# Patient Record
Sex: MALE | Race: WHITE | Employment: OTHER | URBAN - METROPOLITAN AREA
[De-identification: names, ages, dates, MRNs, and addresses within clinical notes are randomized per-mention and may not be internally consistent; named-entity substitution may affect disease eponyms.]

---

## 2017-12-01 PROBLEM — I51.9 LV DYSFUNCTION: Status: ACTIVE | Noted: 2017-12-01

## 2018-04-10 ENCOUNTER — OFFICE VISIT (OUTPATIENT)
Dept: CARDIOLOGY CLINIC | Age: 78
End: 2018-04-10
Payer: MEDICARE

## 2018-04-10 VITALS
HEART RATE: 63 BPM | BODY MASS INDEX: 23.84 KG/M2 | HEIGHT: 72 IN | DIASTOLIC BLOOD PRESSURE: 68 MMHG | RESPIRATION RATE: 18 BRPM | WEIGHT: 176 LBS | SYSTOLIC BLOOD PRESSURE: 104 MMHG

## 2018-04-10 DIAGNOSIS — I10 ESSENTIAL HYPERTENSION: Chronic | ICD-10-CM

## 2018-04-10 DIAGNOSIS — I51.9 LV DYSFUNCTION: ICD-10-CM

## 2018-04-10 DIAGNOSIS — I50.22 CHRONIC SYSTOLIC CHF (CONGESTIVE HEART FAILURE) (HCC): Primary | ICD-10-CM

## 2018-04-10 PROCEDURE — G8420 CALC BMI NORM PARAMETERS: HCPCS | Performed by: INTERNAL MEDICINE

## 2018-04-10 PROCEDURE — G8427 DOCREV CUR MEDS BY ELIG CLIN: HCPCS | Performed by: INTERNAL MEDICINE

## 2018-04-10 PROCEDURE — 99213 OFFICE O/P EST LOW 20 MIN: CPT | Performed by: INTERNAL MEDICINE

## 2018-04-10 PROCEDURE — 93000 ELECTROCARDIOGRAM COMPLETE: CPT | Performed by: INTERNAL MEDICINE

## 2018-04-10 PROCEDURE — 4040F PNEUMOC VAC/ADMIN/RCVD: CPT | Performed by: INTERNAL MEDICINE

## 2018-04-10 PROCEDURE — 1123F ACP DISCUSS/DSCN MKR DOCD: CPT | Performed by: INTERNAL MEDICINE

## 2018-04-10 PROCEDURE — 1036F TOBACCO NON-USER: CPT | Performed by: INTERNAL MEDICINE

## 2018-04-10 RX ORDER — OFLOXACIN 3 MG/ML
SOLUTION/ DROPS OPHTHALMIC
Refills: 1 | COMMUNITY
Start: 2018-03-01 | End: 2018-05-02 | Stop reason: ALTCHOICE

## 2018-04-10 RX ORDER — LOTEPREDNOL ETABONATE 5 MG/G
GEL OPHTHALMIC
Refills: 1 | COMMUNITY
Start: 2018-04-04 | End: 2018-05-02 | Stop reason: ALTCHOICE

## 2018-04-26 ENCOUNTER — TELEPHONE (OUTPATIENT)
Dept: CARDIOLOGY CLINIC | Age: 78
End: 2018-04-26

## 2018-05-02 ENCOUNTER — OFFICE VISIT (OUTPATIENT)
Dept: CARDIOLOGY CLINIC | Age: 78
End: 2018-05-02
Payer: MEDICARE

## 2018-05-02 ENCOUNTER — HOSPITAL ENCOUNTER (OUTPATIENT)
Age: 78
Discharge: HOME | End: 2018-05-04
Payer: MEDICARE

## 2018-05-02 VITALS
DIASTOLIC BLOOD PRESSURE: 60 MMHG | SYSTOLIC BLOOD PRESSURE: 90 MMHG | HEIGHT: 72 IN | RESPIRATION RATE: 18 BRPM | WEIGHT: 171.8 LBS | BODY MASS INDEX: 23.27 KG/M2

## 2018-05-02 DIAGNOSIS — I50.23 ACUTE ON CHRONIC SYSTOLIC CHF (CONGESTIVE HEART FAILURE) (HCC): ICD-10-CM

## 2018-05-02 DIAGNOSIS — M79.89 LEG SWELLING: ICD-10-CM

## 2018-05-02 DIAGNOSIS — I50.43 ACUTE ON CHRONIC COMBINED SYSTOLIC AND DIASTOLIC CONGESTIVE HEART FAILURE (HCC): Primary | ICD-10-CM

## 2018-05-02 DIAGNOSIS — I95.89 OTHER SPECIFIED HYPOTENSION: ICD-10-CM

## 2018-05-02 DIAGNOSIS — Q21.12 PFO (PATENT FORAMEN OVALE): ICD-10-CM

## 2018-05-02 DIAGNOSIS — I50.43 ACUTE ON CHRONIC COMBINED SYSTOLIC AND DIASTOLIC CONGESTIVE HEART FAILURE (HCC): ICD-10-CM

## 2018-05-02 DIAGNOSIS — I10 ESSENTIAL HYPERTENSION: Chronic | ICD-10-CM

## 2018-05-02 DIAGNOSIS — E78.5 HYPERLIPIDEMIA LDL GOAL <100: Chronic | ICD-10-CM

## 2018-05-02 LAB
ANION GAP SERPL CALCULATED.3IONS-SCNC: 12 MMOL/L (ref 7–16)
BUN BLDV-MCNC: 24 MG/DL (ref 8–23)
CALCIUM SERPL-MCNC: 9.9 MG/DL (ref 8.6–10.2)
CHLORIDE BLD-SCNC: 100 MMOL/L (ref 98–107)
CO2: 29 MMOL/L (ref 22–29)
CREAT SERPL-MCNC: 1 MG/DL (ref 0.7–1.2)
GFR AFRICAN AMERICAN: >60
GFR NON-AFRICAN AMERICAN: >60 ML/MIN/1.73
GLUCOSE BLD-MCNC: 101 MG/DL (ref 74–109)
HCT VFR BLD CALC: 43.2 % (ref 37–54)
HEMOGLOBIN: 14.5 G/DL (ref 12.5–16.5)
MCH RBC QN AUTO: 32.8 PG (ref 26–35)
MCHC RBC AUTO-ENTMCNC: 33.6 % (ref 32–34.5)
MCV RBC AUTO: 97.7 FL (ref 80–99.9)
PDW BLD-RTO: 12.5 FL (ref 11.5–15)
PLATELET # BLD: 211 E9/L (ref 130–450)
PMV BLD AUTO: 11.4 FL (ref 7–12)
POTASSIUM SERPL-SCNC: 4.5 MMOL/L (ref 3.5–5)
PRO-BNP: 2087 PG/ML (ref 0–450)
RBC # BLD: 4.42 E12/L (ref 3.8–5.8)
SODIUM BLD-SCNC: 141 MMOL/L (ref 132–146)
WBC # BLD: 6 E9/L (ref 4.5–11.5)

## 2018-05-02 PROCEDURE — G8427 DOCREV CUR MEDS BY ELIG CLIN: HCPCS | Performed by: PHYSICIAN ASSISTANT

## 2018-05-02 PROCEDURE — 93000 ELECTROCARDIOGRAM COMPLETE: CPT | Performed by: INTERNAL MEDICINE

## 2018-05-02 PROCEDURE — 4040F PNEUMOC VAC/ADMIN/RCVD: CPT | Performed by: PHYSICIAN ASSISTANT

## 2018-05-02 PROCEDURE — 80048 BASIC METABOLIC PNL TOTAL CA: CPT

## 2018-05-02 PROCEDURE — 1123F ACP DISCUSS/DSCN MKR DOCD: CPT | Performed by: PHYSICIAN ASSISTANT

## 2018-05-02 PROCEDURE — 1036F TOBACCO NON-USER: CPT | Performed by: PHYSICIAN ASSISTANT

## 2018-05-02 PROCEDURE — 99214 OFFICE O/P EST MOD 30 MIN: CPT | Performed by: PHYSICIAN ASSISTANT

## 2018-05-02 PROCEDURE — G8420 CALC BMI NORM PARAMETERS: HCPCS | Performed by: PHYSICIAN ASSISTANT

## 2018-05-02 PROCEDURE — 83880 ASSAY OF NATRIURETIC PEPTIDE: CPT

## 2018-05-02 PROCEDURE — 85027 COMPLETE CBC AUTOMATED: CPT

## 2018-05-02 RX ORDER — FUROSEMIDE 40 MG/1
TABLET ORAL
Qty: 30 TABLET | Refills: 5 | Status: SHIPPED | OUTPATIENT
Start: 2018-05-02 | End: 2020-01-01 | Stop reason: ALTCHOICE

## 2018-05-03 ENCOUNTER — TELEPHONE (OUTPATIENT)
Dept: CARDIOLOGY CLINIC | Age: 78
End: 2018-05-03

## 2018-05-09 ENCOUNTER — OFFICE VISIT (OUTPATIENT)
Dept: CARDIOLOGY CLINIC | Age: 78
End: 2018-05-09
Payer: MEDICARE

## 2018-05-09 ENCOUNTER — HOSPITAL ENCOUNTER (OUTPATIENT)
Age: 78
Discharge: HOME | End: 2018-05-11
Payer: MEDICARE

## 2018-05-09 VITALS
RESPIRATION RATE: 12 BRPM | HEIGHT: 72 IN | WEIGHT: 166 LBS | SYSTOLIC BLOOD PRESSURE: 105 MMHG | HEART RATE: 73 BPM | DIASTOLIC BLOOD PRESSURE: 60 MMHG | BODY MASS INDEX: 22.48 KG/M2

## 2018-05-09 DIAGNOSIS — E78.5 HYPERLIPIDEMIA LDL GOAL <100: Chronic | ICD-10-CM

## 2018-05-09 DIAGNOSIS — I47.29 NSVT (NONSUSTAINED VENTRICULAR TACHYCARDIA): ICD-10-CM

## 2018-05-09 DIAGNOSIS — I50.43 ACUTE ON CHRONIC COMBINED SYSTOLIC AND DIASTOLIC CONGESTIVE HEART FAILURE (HCC): ICD-10-CM

## 2018-05-09 DIAGNOSIS — I42.8 NICM (NONISCHEMIC CARDIOMYOPATHY) (HCC): ICD-10-CM

## 2018-05-09 DIAGNOSIS — I50.43 ACUTE ON CHRONIC COMBINED SYSTOLIC AND DIASTOLIC CONGESTIVE HEART FAILURE (HCC): Primary | ICD-10-CM

## 2018-05-09 DIAGNOSIS — I10 ESSENTIAL HYPERTENSION: Chronic | ICD-10-CM

## 2018-05-09 DIAGNOSIS — R60.0 LEG EDEMA: ICD-10-CM

## 2018-05-09 LAB
ANION GAP SERPL CALCULATED.3IONS-SCNC: 12 MMOL/L (ref 7–16)
BUN BLDV-MCNC: 29 MG/DL (ref 8–23)
CALCIUM SERPL-MCNC: 9.9 MG/DL (ref 8.6–10.2)
CHLORIDE BLD-SCNC: 101 MMOL/L (ref 98–107)
CO2: 28 MMOL/L (ref 22–29)
CREAT SERPL-MCNC: 1.1 MG/DL (ref 0.7–1.2)
GFR AFRICAN AMERICAN: >60
GFR NON-AFRICAN AMERICAN: >60 ML/MIN/1.73
GLUCOSE BLD-MCNC: 189 MG/DL (ref 74–109)
POTASSIUM SERPL-SCNC: 4.7 MMOL/L (ref 3.5–5)
PRO-BNP: 1356 PG/ML (ref 0–450)
SODIUM BLD-SCNC: 141 MMOL/L (ref 132–146)

## 2018-05-09 PROCEDURE — 83880 ASSAY OF NATRIURETIC PEPTIDE: CPT

## 2018-05-09 PROCEDURE — 93000 ELECTROCARDIOGRAM COMPLETE: CPT | Performed by: INTERNAL MEDICINE

## 2018-05-09 PROCEDURE — 1123F ACP DISCUSS/DSCN MKR DOCD: CPT | Performed by: PHYSICIAN ASSISTANT

## 2018-05-09 PROCEDURE — G8420 CALC BMI NORM PARAMETERS: HCPCS | Performed by: PHYSICIAN ASSISTANT

## 2018-05-09 PROCEDURE — 80048 BASIC METABOLIC PNL TOTAL CA: CPT

## 2018-05-09 PROCEDURE — 4040F PNEUMOC VAC/ADMIN/RCVD: CPT | Performed by: PHYSICIAN ASSISTANT

## 2018-05-09 PROCEDURE — 1036F TOBACCO NON-USER: CPT | Performed by: PHYSICIAN ASSISTANT

## 2018-05-09 PROCEDURE — G8427 DOCREV CUR MEDS BY ELIG CLIN: HCPCS | Performed by: PHYSICIAN ASSISTANT

## 2018-05-09 PROCEDURE — 99214 OFFICE O/P EST MOD 30 MIN: CPT | Performed by: PHYSICIAN ASSISTANT

## 2018-05-09 RX ORDER — SPIRONOLACTONE 25 MG/1
12.5 TABLET ORAL DAILY
Qty: 15 TABLET | Refills: 5 | Status: SHIPPED | OUTPATIENT
Start: 2018-05-09 | End: 2018-10-22 | Stop reason: SDUPTHER

## 2018-05-10 DIAGNOSIS — I51.9 LV DYSFUNCTION: ICD-10-CM

## 2018-05-10 DIAGNOSIS — I50.22 CHRONIC SYSTOLIC CHF (CONGESTIVE HEART FAILURE) (HCC): ICD-10-CM

## 2018-05-10 DIAGNOSIS — I10 ESSENTIAL HYPERTENSION: Primary | Chronic | ICD-10-CM

## 2018-05-11 ENCOUNTER — TELEPHONE (OUTPATIENT)
Dept: CARDIOLOGY CLINIC | Age: 78
End: 2018-05-11

## 2018-05-24 ENCOUNTER — HOSPITAL ENCOUNTER (OUTPATIENT)
Age: 78
Discharge: HOME | End: 2018-05-26
Payer: MEDICARE

## 2018-05-24 ENCOUNTER — OFFICE VISIT (OUTPATIENT)
Dept: CARDIOLOGY CLINIC | Age: 78
End: 2018-05-24
Payer: MEDICARE

## 2018-05-24 VITALS
RESPIRATION RATE: 16 BRPM | DIASTOLIC BLOOD PRESSURE: 60 MMHG | WEIGHT: 168 LBS | HEIGHT: 72 IN | HEART RATE: 67 BPM | SYSTOLIC BLOOD PRESSURE: 102 MMHG | BODY MASS INDEX: 22.75 KG/M2

## 2018-05-24 DIAGNOSIS — I10 ESSENTIAL HYPERTENSION: Chronic | ICD-10-CM

## 2018-05-24 DIAGNOSIS — I50.43 ACUTE ON CHRONIC COMBINED SYSTOLIC AND DIASTOLIC CONGESTIVE HEART FAILURE (HCC): Primary | ICD-10-CM

## 2018-05-24 DIAGNOSIS — I51.9 LV DYSFUNCTION: ICD-10-CM

## 2018-05-24 DIAGNOSIS — I47.29 NSVT (NONSUSTAINED VENTRICULAR TACHYCARDIA): ICD-10-CM

## 2018-05-24 DIAGNOSIS — I50.43 ACUTE ON CHRONIC COMBINED SYSTOLIC AND DIASTOLIC CONGESTIVE HEART FAILURE (HCC): ICD-10-CM

## 2018-05-24 LAB
ANION GAP SERPL CALCULATED.3IONS-SCNC: 13 MMOL/L (ref 7–16)
BUN BLDV-MCNC: 26 MG/DL (ref 8–23)
CALCIUM SERPL-MCNC: 9.8 MG/DL (ref 8.6–10.2)
CHLORIDE BLD-SCNC: 96 MMOL/L (ref 98–107)
CO2: 28 MMOL/L (ref 22–29)
CREAT SERPL-MCNC: 1.1 MG/DL (ref 0.7–1.2)
GFR AFRICAN AMERICAN: >60
GFR NON-AFRICAN AMERICAN: >60 ML/MIN/1.73
GLUCOSE BLD-MCNC: 196 MG/DL (ref 74–109)
POTASSIUM SERPL-SCNC: 4.9 MMOL/L (ref 3.5–5)
PRO-BNP: 1550 PG/ML (ref 0–450)
SODIUM BLD-SCNC: 137 MMOL/L (ref 132–146)

## 2018-05-24 PROCEDURE — G8427 DOCREV CUR MEDS BY ELIG CLIN: HCPCS | Performed by: PHYSICIAN ASSISTANT

## 2018-05-24 PROCEDURE — 1036F TOBACCO NON-USER: CPT | Performed by: PHYSICIAN ASSISTANT

## 2018-05-24 PROCEDURE — G8420 CALC BMI NORM PARAMETERS: HCPCS | Performed by: PHYSICIAN ASSISTANT

## 2018-05-24 PROCEDURE — 83880 ASSAY OF NATRIURETIC PEPTIDE: CPT

## 2018-05-24 PROCEDURE — 93000 ELECTROCARDIOGRAM COMPLETE: CPT | Performed by: INTERNAL MEDICINE

## 2018-05-24 PROCEDURE — 1123F ACP DISCUSS/DSCN MKR DOCD: CPT | Performed by: PHYSICIAN ASSISTANT

## 2018-05-24 PROCEDURE — 4040F PNEUMOC VAC/ADMIN/RCVD: CPT | Performed by: PHYSICIAN ASSISTANT

## 2018-05-24 PROCEDURE — 80048 BASIC METABOLIC PNL TOTAL CA: CPT

## 2018-05-24 PROCEDURE — 99214 OFFICE O/P EST MOD 30 MIN: CPT | Performed by: PHYSICIAN ASSISTANT

## 2018-05-24 RX ORDER — INSULIN DETEMIR 100 [IU]/ML
12 INJECTION, SOLUTION SUBCUTANEOUS DAILY
Refills: 12 | COMMUNITY
Start: 2018-05-12

## 2018-05-25 ENCOUNTER — TELEPHONE (OUTPATIENT)
Dept: CARDIOLOGY CLINIC | Age: 78
End: 2018-05-25

## 2018-06-20 ENCOUNTER — OFFICE VISIT (OUTPATIENT)
Dept: CARDIOLOGY CLINIC | Age: 78
End: 2018-06-20
Payer: MEDICARE

## 2018-06-20 ENCOUNTER — HOSPITAL ENCOUNTER (OUTPATIENT)
Age: 78
Discharge: HOME | End: 2018-06-22
Payer: MEDICARE

## 2018-06-20 VITALS
HEART RATE: 64 BPM | HEIGHT: 72 IN | DIASTOLIC BLOOD PRESSURE: 62 MMHG | WEIGHT: 165.6 LBS | RESPIRATION RATE: 18 BRPM | SYSTOLIC BLOOD PRESSURE: 100 MMHG | BODY MASS INDEX: 22.43 KG/M2

## 2018-06-20 DIAGNOSIS — I51.9 LV DYSFUNCTION: ICD-10-CM

## 2018-06-20 DIAGNOSIS — I10 ESSENTIAL HYPERTENSION: Chronic | ICD-10-CM

## 2018-06-20 DIAGNOSIS — Z51.81 ENCOUNTER FOR MEDICATION MONITORING: Primary | ICD-10-CM

## 2018-06-20 DIAGNOSIS — I50.22 CHRONIC SYSTOLIC CHF (CONGESTIVE HEART FAILURE) (HCC): ICD-10-CM

## 2018-06-20 DIAGNOSIS — E78.5 HYPERLIPIDEMIA LDL GOAL <100: Chronic | ICD-10-CM

## 2018-06-20 LAB
ANION GAP SERPL CALCULATED.3IONS-SCNC: 16 MMOL/L (ref 7–16)
BUN BLDV-MCNC: 25 MG/DL (ref 8–23)
CALCIUM SERPL-MCNC: 9.9 MG/DL (ref 8.6–10.2)
CHLORIDE BLD-SCNC: 97 MMOL/L (ref 98–107)
CO2: 26 MMOL/L (ref 22–29)
CREAT SERPL-MCNC: 1.1 MG/DL (ref 0.7–1.2)
GFR AFRICAN AMERICAN: >60
GFR NON-AFRICAN AMERICAN: >60 ML/MIN/1.73
GLUCOSE BLD-MCNC: 197 MG/DL (ref 74–109)
POTASSIUM SERPL-SCNC: 4.6 MMOL/L (ref 3.5–5)
SODIUM BLD-SCNC: 139 MMOL/L (ref 132–146)

## 2018-06-20 PROCEDURE — 93000 ELECTROCARDIOGRAM COMPLETE: CPT | Performed by: INTERNAL MEDICINE

## 2018-06-20 PROCEDURE — G8427 DOCREV CUR MEDS BY ELIG CLIN: HCPCS | Performed by: INTERNAL MEDICINE

## 2018-06-20 PROCEDURE — 80048 BASIC METABOLIC PNL TOTAL CA: CPT

## 2018-06-20 PROCEDURE — 1036F TOBACCO NON-USER: CPT | Performed by: INTERNAL MEDICINE

## 2018-06-20 PROCEDURE — 1123F ACP DISCUSS/DSCN MKR DOCD: CPT | Performed by: INTERNAL MEDICINE

## 2018-06-20 PROCEDURE — 99214 OFFICE O/P EST MOD 30 MIN: CPT | Performed by: INTERNAL MEDICINE

## 2018-06-20 PROCEDURE — G8420 CALC BMI NORM PARAMETERS: HCPCS | Performed by: INTERNAL MEDICINE

## 2018-06-20 PROCEDURE — 4040F PNEUMOC VAC/ADMIN/RCVD: CPT | Performed by: INTERNAL MEDICINE

## 2018-06-21 ENCOUNTER — TELEPHONE (OUTPATIENT)
Dept: CARDIOLOGY CLINIC | Age: 78
End: 2018-06-21

## 2018-10-10 ENCOUNTER — OFFICE VISIT (OUTPATIENT)
Dept: CARDIOLOGY CLINIC | Age: 78
End: 2018-10-10
Payer: MEDICARE

## 2018-10-10 VITALS
BODY MASS INDEX: 22.89 KG/M2 | WEIGHT: 169 LBS | HEART RATE: 60 BPM | HEIGHT: 72 IN | RESPIRATION RATE: 12 BRPM | SYSTOLIC BLOOD PRESSURE: 100 MMHG | DIASTOLIC BLOOD PRESSURE: 60 MMHG

## 2018-10-10 DIAGNOSIS — I51.9 LV DYSFUNCTION: ICD-10-CM

## 2018-10-10 DIAGNOSIS — E78.5 HYPERLIPIDEMIA LDL GOAL <100: Chronic | ICD-10-CM

## 2018-10-10 DIAGNOSIS — I50.22 CHRONIC SYSTOLIC CHF (CONGESTIVE HEART FAILURE) (HCC): Primary | ICD-10-CM

## 2018-10-10 DIAGNOSIS — I10 ESSENTIAL HYPERTENSION: Chronic | ICD-10-CM

## 2018-10-10 DIAGNOSIS — Z51.81 ENCOUNTER FOR MEDICATION MONITORING: ICD-10-CM

## 2018-10-10 PROCEDURE — G8420 CALC BMI NORM PARAMETERS: HCPCS | Performed by: INTERNAL MEDICINE

## 2018-10-10 PROCEDURE — 99213 OFFICE O/P EST LOW 20 MIN: CPT | Performed by: INTERNAL MEDICINE

## 2018-10-10 PROCEDURE — 93000 ELECTROCARDIOGRAM COMPLETE: CPT | Performed by: INTERNAL MEDICINE

## 2018-10-10 PROCEDURE — 1036F TOBACCO NON-USER: CPT | Performed by: INTERNAL MEDICINE

## 2018-10-10 PROCEDURE — 4040F PNEUMOC VAC/ADMIN/RCVD: CPT | Performed by: INTERNAL MEDICINE

## 2018-10-10 PROCEDURE — 1101F PT FALLS ASSESS-DOCD LE1/YR: CPT | Performed by: INTERNAL MEDICINE

## 2018-10-10 PROCEDURE — 1123F ACP DISCUSS/DSCN MKR DOCD: CPT | Performed by: INTERNAL MEDICINE

## 2018-10-10 PROCEDURE — G8427 DOCREV CUR MEDS BY ELIG CLIN: HCPCS | Performed by: INTERNAL MEDICINE

## 2018-10-10 PROCEDURE — G8484 FLU IMMUNIZE NO ADMIN: HCPCS | Performed by: INTERNAL MEDICINE

## 2018-10-22 DIAGNOSIS — I50.43 ACUTE ON CHRONIC COMBINED SYSTOLIC AND DIASTOLIC CONGESTIVE HEART FAILURE (HCC): ICD-10-CM

## 2018-10-22 DIAGNOSIS — E78.5 HYPERLIPIDEMIA LDL GOAL <100: Chronic | ICD-10-CM

## 2018-10-22 DIAGNOSIS — I10 ESSENTIAL HYPERTENSION: Chronic | ICD-10-CM

## 2018-10-22 DIAGNOSIS — R60.0 LEG EDEMA: ICD-10-CM

## 2018-10-22 DIAGNOSIS — I47.29 NSVT (NONSUSTAINED VENTRICULAR TACHYCARDIA): ICD-10-CM

## 2018-10-22 DIAGNOSIS — I42.8 NICM (NONISCHEMIC CARDIOMYOPATHY) (HCC): ICD-10-CM

## 2018-10-23 RX ORDER — SPIRONOLACTONE 25 MG/1
TABLET ORAL
Qty: 15 TABLET | Refills: 5 | Status: SHIPPED | OUTPATIENT
Start: 2018-10-23 | End: 2019-01-01 | Stop reason: SDUPTHER

## 2019-01-01 ENCOUNTER — TELEPHONE (OUTPATIENT)
Dept: CARDIOLOGY CLINIC | Age: 79
End: 2019-01-01

## 2019-01-01 ENCOUNTER — OFFICE VISIT (OUTPATIENT)
Dept: CARDIOLOGY CLINIC | Age: 79
End: 2019-01-01
Payer: MEDICARE

## 2019-01-01 ENCOUNTER — HOSPITAL ENCOUNTER (OUTPATIENT)
Age: 79
Discharge: HOME | End: 2019-08-28
Payer: MEDICARE

## 2019-01-01 VITALS
BODY MASS INDEX: 21.94 KG/M2 | HEART RATE: 79 BPM | WEIGHT: 162 LBS | HEIGHT: 72 IN | RESPIRATION RATE: 12 BRPM | DIASTOLIC BLOOD PRESSURE: 42 MMHG | SYSTOLIC BLOOD PRESSURE: 72 MMHG

## 2019-01-01 VITALS
DIASTOLIC BLOOD PRESSURE: 50 MMHG | WEIGHT: 166 LBS | HEIGHT: 72 IN | BODY MASS INDEX: 22.48 KG/M2 | SYSTOLIC BLOOD PRESSURE: 100 MMHG | HEART RATE: 61 BPM | RESPIRATION RATE: 16 BRPM

## 2019-01-01 DIAGNOSIS — E78.5 HYPERLIPIDEMIA LDL GOAL <100: ICD-10-CM

## 2019-01-01 DIAGNOSIS — I10 ESSENTIAL HYPERTENSION: Chronic | ICD-10-CM

## 2019-01-01 DIAGNOSIS — I10 ESSENTIAL HYPERTENSION: ICD-10-CM

## 2019-01-01 DIAGNOSIS — I50.43 ACUTE ON CHRONIC COMBINED SYSTOLIC AND DIASTOLIC CONGESTIVE HEART FAILURE (HCC): ICD-10-CM

## 2019-01-01 DIAGNOSIS — I42.8 NICM (NONISCHEMIC CARDIOMYOPATHY) (HCC): ICD-10-CM

## 2019-01-01 DIAGNOSIS — I47.29 NSVT (NONSUSTAINED VENTRICULAR TACHYCARDIA): ICD-10-CM

## 2019-01-01 DIAGNOSIS — E78.5 HYPERLIPIDEMIA LDL GOAL <100: Chronic | ICD-10-CM

## 2019-01-01 DIAGNOSIS — I50.22 CHRONIC SYSTOLIC CHF (CONGESTIVE HEART FAILURE) (HCC): ICD-10-CM

## 2019-01-01 DIAGNOSIS — R60.0 LEG EDEMA: ICD-10-CM

## 2019-01-01 DIAGNOSIS — Q21.12 PFO (PATENT FORAMEN OVALE): ICD-10-CM

## 2019-01-01 DIAGNOSIS — M79.89 LEG SWELLING: ICD-10-CM

## 2019-01-01 DIAGNOSIS — I51.9 LV DYSFUNCTION: Primary | ICD-10-CM

## 2019-01-01 DIAGNOSIS — I50.22 CHRONIC SYSTOLIC CHF (CONGESTIVE HEART FAILURE) (HCC): Primary | ICD-10-CM

## 2019-01-01 DIAGNOSIS — I50.23 ACUTE ON CHRONIC SYSTOLIC CHF (CONGESTIVE HEART FAILURE) (HCC): ICD-10-CM

## 2019-01-01 DIAGNOSIS — I95.89 OTHER SPECIFIED HYPOTENSION: ICD-10-CM

## 2019-01-01 LAB
ANION GAP SERPL CALCULATED.3IONS-SCNC: 11 MMOL/L (ref 7–16)
BUN BLDV-MCNC: 22 MG/DL (ref 8–23)
CALCIUM SERPL-MCNC: 9.9 MG/DL (ref 8.6–10.2)
CHLORIDE BLD-SCNC: 100 MMOL/L (ref 98–107)
CO2: 29 MMOL/L (ref 22–29)
CREAT SERPL-MCNC: 1 MG/DL (ref 0.7–1.2)
GFR AFRICAN AMERICAN: >60
GFR NON-AFRICAN AMERICAN: >60 ML/MIN/1.73
GLUCOSE BLD-MCNC: 210 MG/DL (ref 74–99)
POTASSIUM SERPL-SCNC: 4.5 MMOL/L (ref 3.5–5)
PRO-BNP: 3029 PG/ML (ref 0–450)
SODIUM BLD-SCNC: 140 MMOL/L (ref 132–146)

## 2019-01-01 PROCEDURE — 4040F PNEUMOC VAC/ADMIN/RCVD: CPT | Performed by: INTERNAL MEDICINE

## 2019-01-01 PROCEDURE — 1123F ACP DISCUSS/DSCN MKR DOCD: CPT | Performed by: INTERNAL MEDICINE

## 2019-01-01 PROCEDURE — 83880 ASSAY OF NATRIURETIC PEPTIDE: CPT

## 2019-01-01 PROCEDURE — G8420 CALC BMI NORM PARAMETERS: HCPCS | Performed by: INTERNAL MEDICINE

## 2019-01-01 PROCEDURE — 93000 ELECTROCARDIOGRAM COMPLETE: CPT | Performed by: INTERNAL MEDICINE

## 2019-01-01 PROCEDURE — 99214 OFFICE O/P EST MOD 30 MIN: CPT | Performed by: INTERNAL MEDICINE

## 2019-01-01 PROCEDURE — 80048 BASIC METABOLIC PNL TOTAL CA: CPT

## 2019-01-01 PROCEDURE — G8427 DOCREV CUR MEDS BY ELIG CLIN: HCPCS | Performed by: INTERNAL MEDICINE

## 2019-01-01 PROCEDURE — 36415 COLL VENOUS BLD VENIPUNCTURE: CPT

## 2019-01-01 PROCEDURE — 1036F TOBACCO NON-USER: CPT | Performed by: INTERNAL MEDICINE

## 2019-01-01 PROCEDURE — 99213 OFFICE O/P EST LOW 20 MIN: CPT | Performed by: INTERNAL MEDICINE

## 2019-01-01 RX ORDER — CARVEDILOL 3.12 MG/1
3.12 TABLET ORAL 2 TIMES DAILY WITH MEALS
Qty: 60 TABLET | Refills: 11 | Status: ON HOLD | OUTPATIENT
Start: 2019-01-01 | End: 2020-01-01 | Stop reason: HOSPADM

## 2019-01-01 RX ORDER — CARVEDILOL 3.12 MG/1
3.12 TABLET ORAL 2 TIMES DAILY WITH MEALS
COMMUNITY
End: 2019-01-01 | Stop reason: SDUPTHER

## 2019-01-01 RX ORDER — SPIRONOLACTONE 25 MG/1
TABLET ORAL
Qty: 45 TABLET | Refills: 3 | Status: SHIPPED | OUTPATIENT
Start: 2019-01-01 | End: 2020-01-01 | Stop reason: ALTCHOICE

## 2019-01-01 RX ORDER — SPIRONOLACTONE 25 MG/1
TABLET ORAL
Qty: 15 TABLET | Refills: 5 | Status: SHIPPED | OUTPATIENT
Start: 2019-01-01 | End: 2019-01-01 | Stop reason: SDUPTHER

## 2019-01-31 ENCOUNTER — OFFICE VISIT (OUTPATIENT)
Dept: CARDIOLOGY CLINIC | Age: 79
End: 2019-01-31
Payer: MEDICARE

## 2019-01-31 VITALS
DIASTOLIC BLOOD PRESSURE: 54 MMHG | HEIGHT: 72 IN | HEART RATE: 65 BPM | RESPIRATION RATE: 16 BRPM | BODY MASS INDEX: 23.61 KG/M2 | WEIGHT: 174.3 LBS | SYSTOLIC BLOOD PRESSURE: 84 MMHG

## 2019-01-31 DIAGNOSIS — R06.02 SOB (SHORTNESS OF BREATH): ICD-10-CM

## 2019-01-31 DIAGNOSIS — I51.9 LV DYSFUNCTION: ICD-10-CM

## 2019-01-31 DIAGNOSIS — I50.22 CHRONIC SYSTOLIC CHF (CONGESTIVE HEART FAILURE) (HCC): Primary | ICD-10-CM

## 2019-01-31 DIAGNOSIS — I10 ESSENTIAL HYPERTENSION: Chronic | ICD-10-CM

## 2019-01-31 PROCEDURE — 1123F ACP DISCUSS/DSCN MKR DOCD: CPT | Performed by: INTERNAL MEDICINE

## 2019-01-31 PROCEDURE — 99214 OFFICE O/P EST MOD 30 MIN: CPT | Performed by: INTERNAL MEDICINE

## 2019-01-31 PROCEDURE — G8420 CALC BMI NORM PARAMETERS: HCPCS | Performed by: INTERNAL MEDICINE

## 2019-01-31 PROCEDURE — 1036F TOBACCO NON-USER: CPT | Performed by: INTERNAL MEDICINE

## 2019-01-31 PROCEDURE — 93000 ELECTROCARDIOGRAM COMPLETE: CPT | Performed by: INTERNAL MEDICINE

## 2019-01-31 PROCEDURE — 4040F PNEUMOC VAC/ADMIN/RCVD: CPT | Performed by: INTERNAL MEDICINE

## 2019-01-31 PROCEDURE — G8484 FLU IMMUNIZE NO ADMIN: HCPCS | Performed by: INTERNAL MEDICINE

## 2019-01-31 PROCEDURE — G8427 DOCREV CUR MEDS BY ELIG CLIN: HCPCS | Performed by: INTERNAL MEDICINE

## 2019-01-31 PROCEDURE — 1101F PT FALLS ASSESS-DOCD LE1/YR: CPT | Performed by: INTERNAL MEDICINE

## 2019-02-14 DIAGNOSIS — I51.9 LV DYSFUNCTION: ICD-10-CM

## 2019-02-26 ENCOUNTER — TELEPHONE (OUTPATIENT)
Dept: CARDIOLOGY CLINIC | Age: 79
End: 2019-02-26

## 2019-04-10 NOTE — TELEPHONE ENCOUNTER
Called patient to inform him that we are currently out of Entresto 49-51mg samples and that I would call back as soon as we got more in.  Ana Cristina Retana MA.

## 2019-04-30 NOTE — PROGRESS NOTES
Patient Active Problem List   Diagnosis    Blind    PFO (patent foramen ovale)    Cellulitis of right hand    Diabetes mellitus type 2, uncontrolled ([facility]    Essential hypertension    Hyperlipidemia LDL goal <100    Acute pulmonary edema (HCC)    Chronic systolic CHF (congestive heart failure) (HCC)    NSVT (nonsustained ventricular tachycardia) (HCC)    LV dysfunction    Encounter for medication monitoring    SOB (shortness of breath)       Current Outpatient Medications   Medication Sig Dispense Refill    sacubitril-valsartan (ENTRESTO) 49-51 MG per tablet Take 1 tablet by mouth 2 times daily 60 tablet 5    spironolactone (ALDACTONE) 25 MG tablet TAKE 1/2 TABLET BY MOUTH DAILY 15 tablet 5    NOVOFINE 32G X 6 MM MISC USE 1 NEEDLE BID  3    ONE TOUCH ULTRA TEST strip TEST D  2    LEVEMIR FLEXTOUCH 100 UNIT/ML injection pen INJECT 12 UNITS DAILY  12    Elastic Bandages & Supports MISC 10-20 mm Hg bilateral legs 2 each 0    furosemide (LASIX) 40 MG tablet TK 1 T PO D 30 tablet 5    docusate sodium (COLACE) 100 MG capsule Take 100 mg by mouth 2 times daily      Multiple Vitamins-Minerals (THERAPEUTIC MULTIVITAMIN-MINERALS) tablet Take 1 tablet by mouth daily      sitagliptan-metformin (JANUMET)  MG per tablet Take 1 tablet by mouth 2 times daily (with meals).  aspirin 81 MG EC tablet Take 81 mg by mouth daily.  carvedilol (COREG) 6.25 MG tablet TAKE 1 TABLET BY MOUTH TWICE DAILY WITH MEALS 60 tablet 5     No current facility-administered medications for this visit. CC:    Patient is seen in follow up for:  1. LV dysfunction    2. Chronic systolic CHF (congestive heart failure) ([facility]    3. Essential hypertension    4. Hyperlipidemia LDL goal <100    5. PFO (patent foramen ovale)    6. NICM (nonischemic cardiomyopathy) ([facility]        HPI:  Mehran Olivia is seen in follow-up evaluation. He seems to be doing better overall post institution of Aldactone and Entrestoi.   Less short of breath. No chest pain. No lightheadedness. ROS:   General: No unusual weight gain, No change in exercise tolerance  EENT: No vision changes or nosebleeds  Cardiovascular: No orthopnea or paroxysmal nocturnal dyspnea  Respiratory: No cough or hemoptysis  Gastrointestinal: No hematemesis or recent changes in bowel habits  Neurologic: No difficulties with speech or unilateral weakness.   Allergic / Immunologic/ Lymphatic / Endocrine: No anemia or bleeding tendency       Social History     Socioeconomic History    Marital status:      Spouse name: Not on file    Number of children: Not on file    Years of education: Not on file    Highest education level: Not on file   Occupational History    Not on file   Social Needs    Financial resource strain: Not on file    Food insecurity:     Worry: Not on file     Inability: Not on file    Transportation needs:     Medical: Not on file     Non-medical: Not on file   Tobacco Use    Smoking status: Never Smoker    Smokeless tobacco: Never Used   Substance and Sexual Activity    Alcohol use: Yes     Comment: occassionally    Drug use: No    Sexual activity: Not on file   Lifestyle    Physical activity:     Days per week: Not on file     Minutes per session: Not on file    Stress: Not on file   Relationships    Social connections:     Talks on phone: Not on file     Gets together: Not on file     Attends Worship service: Not on file     Active member of club or organization: Not on file     Attends meetings of clubs or organizations: Not on file     Relationship status: Not on file    Intimate partner violence:     Fear of current or ex partner: Not on file     Emotionally abused: Not on file     Physically abused: Not on file     Forced sexual activity: Not on file   Other Topics Concern    Not on file   Social History Narrative    Not on file       CONSTITUTIONAL:  Well developed, well nourished    Vitals:    04/29/19 1252   BP: (!) 100/50   Pulse: 61   Resp: 16   Weight: 166 lb (75.3 kg)   Height: 6' (1.829 m)     HEAD & FACE: Normocephalic. Symmetric. EYES: No xanthelasma. Conjunctivae not injected. EARS, NOSE, MOUTH & THROAT: Good dentition. No oral pallor or cyanosis. NECK: No JVD at 30 degrees. No thyromegaly. RESPIRATORY: Clear to auscultation and percussion in all fields. No use of accessory muscle or intercostal retractions. CARDIOVASCULAR: Regular rate and rhythm. No lifts or thrills on palpitation. Auscultation with normal S1-S2 in intensity and splitting. No carotid bruits. Abdominal aorta not enlarged. Femoral arteries without bruits. Pedal pulses 2+. 1+ edema of feet and hands. ABDOMEN: Soft without hepatic or splenic enlargement. No tenderness. MUSCULOSKELETAL: No kyphosis or scoliosis of the back. Good muscle strength and tone. No muscle atrophy. Normal gait and ability to undergo exercise stress testing. EXTREMITIES: No clubbing or cyanosis. SKIN: No Xanthomas or ulcerations. NEUROLOGIC: Oriented to time, place and person. Normal mood and affect. LYMPHATIC:  No palpable neck or supraclavicular nodes. No splenomegaly. EKG: Normal sinus rhythm. Poor R-wave progression. Inferolateral T-wave changes-similar to prior tracings    ASSESSMENT:                                                     ORDERS:       Diagnosis Orders   1. LV dysfunction     2. Chronic systolic CHF (congestive heart failure) ([facility])  EKG 12 Lead   3. Essential hypertension     4. Hyperlipidemia LDL goal <100     5. PFO (patent foramen ovale)     6. N[facility]nonischemic cardiomyopathy) [facility]         PLAN:   See above orders. Reviewed 27/19 labs. Discussed issues that would prompt earlier evaluation. Same medications. Follow-up office visit in 3 mos.

## 2019-07-30 NOTE — PROGRESS NOTES
Patient Active Problem List   Diagnosis    Blind    PFO (patent foramen ovale)    Cellulitis of right hand    Diabetes mellitus type 2, uncontrolled ([facility]    Essential hypertension    Hyperlipidemia LDL goal <100    Acute pulmonary edema (HCC)    Chronic systolic CHF (congestive heart failure) (HCC)    NSVT (nonsustained ventricular tachycardia) (HCC)    LV dysfunction    Encounter for medication monitoring    SOB (shortness of breath)       Current Outpatient Medications   Medication Sig Dispense Refill    carvedilol (COREG) 3.125 MG tablet Take 1 tablet by mouth 2 times daily (with meals) 60 tablet 11    sacubitril-valsartan (ENTRESTO) 49-51 MG per tablet Take 1 tablet by mouth 2 times daily 60 tablet 5    spironolactone (ALDACTONE) 25 MG tablet TAKE 1/2 TABLET BY MOUTH DAILY 15 tablet 5    NOVOFINE 32G X 6 MM MISC USE 1 NEEDLE BID  3    ONE TOUCH ULTRA TEST strip TEST D  2    LEVEMIR FLEXTOUCH 100 UNIT/ML injection pen INJECT 12 UNITS DAILY  12    Elastic Bandages & Supports MISC 10-20 mm Hg bilateral legs 2 each 0    furosemide (LASIX) 40 MG tablet TK 1 T PO D 30 tablet 5    docusate sodium (COLACE) 100 MG capsule Take 100 mg by mouth 2 times daily      Multiple Vitamins-Minerals (THERAPEUTIC MULTIVITAMIN-MINERALS) tablet Take 1 tablet by mouth daily      sitagliptan-metformin (JANUMET)  MG per tablet Take 1 tablet by mouth 2 times daily (with meals).  aspirin 81 MG EC tablet Take 81 mg by mouth daily. No current facility-administered medications for this visit. CC:    Patient is seen for new problem of hypotension and in follow up for:  1. LV dysfunction    2. Chronic systolic CHF (congestive heart failure) ([facility]    3. Essential hypertension    4. Hyperlipidemia LDL goal <100    5. PFO (patent foramen ovale)    6. NICM (nonischemic cardiomyopathy) ([facility]        HPI:  Sean Hills is seen in follow-up evaluation. He seems to be doing better overall post institution of

## 2019-08-15 NOTE — TELEPHONE ENCOUNTER
Patient called in and stated that he has been getting some low blood pressure readings     This morning 79/52    Yesterday   68/41  74/46  73/45    Monday morning it was 84/49 and he felt like he could not balance right and he feels like he tires easily.

## 2019-08-15 NOTE — TELEPHONE ENCOUNTER
Patient notified and instructed on decreasing the dose of Entresto to 24/26. He stated understanding.

## 2020-01-01 ENCOUNTER — APPOINTMENT (OUTPATIENT)
Dept: NON INVASIVE DIAGNOSTICS | Age: 80
DRG: 695 | End: 2020-01-01
Payer: MEDICARE

## 2020-01-01 ENCOUNTER — HOSPITAL ENCOUNTER (OUTPATIENT)
Dept: NON INVASIVE DIAGNOSTICS | Age: 80
Discharge: HOME | End: 2020-02-04
Payer: MEDICARE

## 2020-01-01 ENCOUNTER — APPOINTMENT (OUTPATIENT)
Dept: GENERAL RADIOLOGY | Age: 80
DRG: 064 | End: 2020-01-01
Payer: MEDICARE

## 2020-01-01 ENCOUNTER — APPOINTMENT (OUTPATIENT)
Dept: NEUROLOGY | Age: 80
DRG: 064 | End: 2020-01-01
Payer: MEDICARE

## 2020-01-01 ENCOUNTER — TELEPHONE (OUTPATIENT)
Dept: CARDIOLOGY CLINIC | Age: 80
End: 2020-01-01

## 2020-01-01 ENCOUNTER — APPOINTMENT (OUTPATIENT)
Dept: ULTRASOUND IMAGING | Age: 80
DRG: 695 | End: 2020-01-01
Payer: MEDICARE

## 2020-01-01 ENCOUNTER — ANESTHESIA (OUTPATIENT)
Dept: OPERATING ROOM | Age: 80
End: 2020-01-01
Payer: MEDICARE

## 2020-01-01 ENCOUNTER — HOSPITAL ENCOUNTER (INPATIENT)
Age: 80
LOS: 7 days | Discharge: HOSPICE | DRG: 064 | End: 2020-03-12
Attending: EMERGENCY MEDICINE | Admitting: FAMILY MEDICINE
Payer: MEDICARE

## 2020-01-01 ENCOUNTER — APPOINTMENT (OUTPATIENT)
Dept: GENERAL RADIOLOGY | Age: 80
DRG: 695 | End: 2020-01-01
Payer: MEDICARE

## 2020-01-01 ENCOUNTER — APPOINTMENT (OUTPATIENT)
Dept: CT IMAGING | Age: 80
DRG: 064 | End: 2020-01-01
Payer: MEDICARE

## 2020-01-01 ENCOUNTER — TELEPHONE (OUTPATIENT)
Dept: OTHER | Facility: CLINIC | Age: 80
End: 2020-01-01

## 2020-01-01 ENCOUNTER — HOSPITAL ENCOUNTER (OUTPATIENT)
Age: 80
Setting detail: OUTPATIENT SURGERY
Discharge: HOME | End: 2020-02-27
Attending: UROLOGY | Admitting: UROLOGY
Payer: MEDICARE

## 2020-01-01 ENCOUNTER — HOSPITAL ENCOUNTER (INPATIENT)
Age: 80
LOS: 5 days | Discharge: HOME | DRG: 293 | End: 2020-01-14
Attending: EMERGENCY MEDICINE | Admitting: FAMILY MEDICINE
Payer: MEDICARE

## 2020-01-01 ENCOUNTER — TELEPHONE (OUTPATIENT)
Dept: ADMINISTRATIVE | Age: 80
End: 2020-01-01

## 2020-01-01 ENCOUNTER — HOSPITAL ENCOUNTER (EMERGENCY)
Age: 80
Discharge: HOME | End: 2020-01-30
Attending: EMERGENCY MEDICINE
Payer: MEDICARE

## 2020-01-01 ENCOUNTER — CARE COORDINATION (OUTPATIENT)
Dept: CASE MANAGEMENT | Age: 80
End: 2020-01-01

## 2020-01-01 ENCOUNTER — APPOINTMENT (OUTPATIENT)
Dept: NUCLEAR MEDICINE | Age: 80
DRG: 695 | End: 2020-01-01
Payer: MEDICARE

## 2020-01-01 ENCOUNTER — APPOINTMENT (OUTPATIENT)
Dept: GENERAL RADIOLOGY | Age: 80
DRG: 293 | End: 2020-01-01
Payer: MEDICARE

## 2020-01-01 ENCOUNTER — APPOINTMENT (OUTPATIENT)
Dept: GENERAL RADIOLOGY | Age: 80
End: 2020-01-01
Attending: UROLOGY
Payer: MEDICARE

## 2020-01-01 ENCOUNTER — OFFICE VISIT (OUTPATIENT)
Dept: CARDIOLOGY CLINIC | Age: 80
End: 2020-01-01
Payer: MEDICARE

## 2020-01-01 ENCOUNTER — HOSPITAL ENCOUNTER (EMERGENCY)
Age: 80
Discharge: HOME | End: 2020-02-29
Payer: MEDICARE

## 2020-01-01 ENCOUNTER — APPOINTMENT (OUTPATIENT)
Dept: MRI IMAGING | Age: 80
DRG: 064 | End: 2020-01-01
Payer: MEDICARE

## 2020-01-01 ENCOUNTER — HOSPITAL ENCOUNTER (INPATIENT)
Age: 80
LOS: 2 days | Discharge: HOME | DRG: 695 | End: 2020-02-06
Attending: EMERGENCY MEDICINE | Admitting: INTERNAL MEDICINE
Payer: MEDICARE

## 2020-01-01 ENCOUNTER — ANESTHESIA EVENT (OUTPATIENT)
Dept: OPERATING ROOM | Age: 80
End: 2020-01-01
Payer: MEDICARE

## 2020-01-01 VITALS
HEIGHT: 70 IN | HEART RATE: 91 BPM | OXYGEN SATURATION: 100 % | DIASTOLIC BLOOD PRESSURE: 51 MMHG | BODY MASS INDEX: 25.94 KG/M2 | SYSTOLIC BLOOD PRESSURE: 113 MMHG | RESPIRATION RATE: 12 BRPM | WEIGHT: 181.22 LBS | TEMPERATURE: 98.8 F

## 2020-01-01 VITALS
HEIGHT: 70 IN | WEIGHT: 170 LBS | HEART RATE: 84 BPM | SYSTOLIC BLOOD PRESSURE: 104 MMHG | OXYGEN SATURATION: 98 % | RESPIRATION RATE: 17 BRPM | BODY MASS INDEX: 24.48 KG/M2 | DIASTOLIC BLOOD PRESSURE: 70 MMHG | TEMPERATURE: 98.2 F | RESPIRATION RATE: 18 BRPM | HEIGHT: 70 IN | HEART RATE: 76 BPM | OXYGEN SATURATION: 98 % | DIASTOLIC BLOOD PRESSURE: 70 MMHG | SYSTOLIC BLOOD PRESSURE: 120 MMHG | TEMPERATURE: 98.2 F | BODY MASS INDEX: 24.34 KG/M2 | WEIGHT: 171 LBS

## 2020-01-01 VITALS
OXYGEN SATURATION: 93 % | BODY MASS INDEX: 24.34 KG/M2 | TEMPERATURE: 97.4 F | HEIGHT: 70 IN | WEIGHT: 170 LBS | HEART RATE: 79 BPM | DIASTOLIC BLOOD PRESSURE: 54 MMHG | RESPIRATION RATE: 18 BRPM | SYSTOLIC BLOOD PRESSURE: 92 MMHG

## 2020-01-01 VITALS
RESPIRATION RATE: 16 BRPM | OXYGEN SATURATION: 98 % | WEIGHT: 159 LBS | BODY MASS INDEX: 22.76 KG/M2 | SYSTOLIC BLOOD PRESSURE: 103 MMHG | TEMPERATURE: 97.6 F | HEART RATE: 78 BPM | DIASTOLIC BLOOD PRESSURE: 64 MMHG | HEIGHT: 70 IN

## 2020-01-01 VITALS
OXYGEN SATURATION: 100 % | RESPIRATION RATE: 7 BRPM | SYSTOLIC BLOOD PRESSURE: 103 MMHG | DIASTOLIC BLOOD PRESSURE: 64 MMHG

## 2020-01-01 VITALS
SYSTOLIC BLOOD PRESSURE: 116 MMHG | RESPIRATION RATE: 16 BRPM | HEIGHT: 70 IN | BODY MASS INDEX: 23.26 KG/M2 | WEIGHT: 162.5 LBS | HEART RATE: 53 BPM | DIASTOLIC BLOOD PRESSURE: 62 MMHG

## 2020-01-01 VITALS — WEIGHT: 162 LBS | BODY MASS INDEX: 23.19 KG/M2 | HEIGHT: 70 IN

## 2020-01-01 VITALS
RESPIRATION RATE: 18 BRPM | WEIGHT: 171.2 LBS | BODY MASS INDEX: 24.51 KG/M2 | HEART RATE: 72 BPM | HEIGHT: 70 IN | SYSTOLIC BLOOD PRESSURE: 92 MMHG | DIASTOLIC BLOOD PRESSURE: 60 MMHG

## 2020-01-01 VITALS
OXYGEN SATURATION: 99 % | BODY MASS INDEX: 23.24 KG/M2 | WEIGHT: 162 LBS | HEART RATE: 59 BPM | RESPIRATION RATE: 18 BRPM | DIASTOLIC BLOOD PRESSURE: 52 MMHG | TEMPERATURE: 98.6 F | SYSTOLIC BLOOD PRESSURE: 97 MMHG

## 2020-01-01 LAB
AADO2: 112.2 MMHG
AADO2: 113.6 MMHG
AADO2: 139.1 MMHG
AADO2: 142.2 MMHG
AADO2: 150.5 MMHG
AADO2: 151.2 MMHG
AADO2: 172.4 MMHG
AADO2: 176 MMHG
ABO/RH: NORMAL
ADENOVIRUS BY PCR: NOT DETECTED
ALBUMIN SERPL-MCNC: 2.5 G/DL (ref 3.5–5.2)
ALBUMIN SERPL-MCNC: 2.6 G/DL (ref 3.5–5.2)
ALBUMIN SERPL-MCNC: 2.6 G/DL (ref 3.5–5.2)
ALBUMIN SERPL-MCNC: 2.7 G/DL (ref 3.5–5.2)
ALBUMIN SERPL-MCNC: 2.8 G/DL (ref 3.5–5.2)
ALBUMIN SERPL-MCNC: 3 G/DL (ref 3.5–5.2)
ALBUMIN SERPL-MCNC: 3.2 G/DL (ref 3.5–5.2)
ALBUMIN SERPL-MCNC: 3.8 G/DL (ref 3.5–5.2)
ALBUMIN SERPL-MCNC: 3.9 G/DL (ref 3.5–5.2)
ALBUMIN SERPL-MCNC: 4 G/DL (ref 3.5–5.2)
ALP BLD-CCNC: 53 U/L (ref 40–129)
ALP BLD-CCNC: 58 U/L (ref 40–129)
ALP BLD-CCNC: 59 U/L (ref 40–129)
ALP BLD-CCNC: 59 U/L (ref 40–129)
ALP BLD-CCNC: 63 U/L (ref 40–129)
ALP BLD-CCNC: 67 U/L (ref 40–129)
ALP BLD-CCNC: 68 U/L (ref 40–129)
ALP BLD-CCNC: 69 U/L (ref 40–129)
ALP BLD-CCNC: 70 U/L (ref 40–129)
ALP BLD-CCNC: 73 U/L (ref 40–129)
ALT SERPL-CCNC: 10 U/L (ref 0–40)
ALT SERPL-CCNC: 11 U/L (ref 0–40)
ALT SERPL-CCNC: 13 U/L (ref 0–40)
ALT SERPL-CCNC: 14 U/L (ref 0–40)
ALT SERPL-CCNC: 17 U/L (ref 0–40)
ALT SERPL-CCNC: 20 U/L (ref 0–40)
ALT SERPL-CCNC: 23 U/L (ref 0–40)
ALT SERPL-CCNC: 25 U/L (ref 0–40)
ALT SERPL-CCNC: 30 U/L (ref 0–40)
ALT SERPL-CCNC: 39 U/L (ref 0–40)
ANION GAP SERPL CALCULATED.3IONS-SCNC: 10 MMOL/L (ref 7–16)
ANION GAP SERPL CALCULATED.3IONS-SCNC: 11 MMOL/L (ref 7–16)
ANION GAP SERPL CALCULATED.3IONS-SCNC: 12 MMOL/L (ref 7–16)
ANION GAP SERPL CALCULATED.3IONS-SCNC: 13 MMOL/L (ref 7–16)
ANION GAP SERPL CALCULATED.3IONS-SCNC: 14 MMOL/L (ref 7–16)
ANION GAP SERPL CALCULATED.3IONS-SCNC: 15 MMOL/L (ref 7–16)
ANION GAP SERPL CALCULATED.3IONS-SCNC: 17 MMOL/L (ref 7–16)
ANION GAP SERPL CALCULATED.3IONS-SCNC: 9 MMOL/L (ref 7–16)
ANTIBODY SCREEN: NORMAL
APTT: 27.8 SEC (ref 24.5–35.1)
APTT: 27.9 SEC (ref 24.5–35.1)
APTT: 31.9 SEC (ref 24.5–35.1)
APTT: 48.6 SEC (ref 24.5–35.1)
APTT: 57.3 SEC (ref 24.5–35.1)
APTT: 58.4 SEC (ref 24.5–35.1)
APTT: 59.6 SEC (ref 24.5–35.1)
APTT: 60.8 SEC (ref 24.5–35.1)
APTT: 64 SEC (ref 24.5–35.1)
APTT: 66.6 SEC (ref 24.5–35.1)
APTT: 66.7 SEC (ref 24.5–35.1)
APTT: 92.8 SEC (ref 24.5–35.1)
AST SERPL-CCNC: 18 U/L (ref 0–39)
AST SERPL-CCNC: 18 U/L (ref 0–39)
AST SERPL-CCNC: 19 U/L (ref 0–39)
AST SERPL-CCNC: 21 U/L (ref 0–39)
AST SERPL-CCNC: 23 U/L (ref 0–39)
AST SERPL-CCNC: 23 U/L (ref 0–39)
AST SERPL-CCNC: 27 U/L (ref 0–39)
AST SERPL-CCNC: 38 U/L (ref 0–39)
AST SERPL-CCNC: 43 U/L (ref 0–39)
AST SERPL-CCNC: 52 U/L (ref 0–39)
B.E.: -1.3 MMOL/L (ref -3–3)
B.E.: -2.4 MMOL/L (ref -3–3)
B.E.: -3 MMOL/L (ref -3–3)
B.E.: -4.4 MMOL/L (ref -3–3)
B.E.: 1.6 MMOL/L (ref -3–3)
B.E.: 2.8 MMOL/L (ref -3–3)
B.E.: 3.8 MMOL/L (ref -3–3)
B.E.: 6.6 MMOL/L (ref -3–3)
BACTERIA: ABNORMAL /HPF
BACTERIA: NORMAL /HPF
BACTERIA: NORMAL /HPF
BASOPHILS ABSOLUTE: 0.02 E9/L (ref 0–0.2)
BASOPHILS ABSOLUTE: 0.02 E9/L (ref 0–0.2)
BASOPHILS ABSOLUTE: 0.03 E9/L (ref 0–0.2)
BASOPHILS RELATIVE PERCENT: 0.2 % (ref 0–2)
BASOPHILS RELATIVE PERCENT: 0.4 % (ref 0–2)
BASOPHILS RELATIVE PERCENT: 0.5 % (ref 0–2)
BILIRUB SERPL-MCNC: 0.2 MG/DL (ref 0–1.2)
BILIRUB SERPL-MCNC: 0.3 MG/DL (ref 0–1.2)
BILIRUB SERPL-MCNC: 0.4 MG/DL (ref 0–1.2)
BILIRUB SERPL-MCNC: 0.4 MG/DL (ref 0–1.2)
BILIRUB SERPL-MCNC: 0.5 MG/DL (ref 0–1.2)
BILIRUB SERPL-MCNC: 0.6 MG/DL (ref 0–1.2)
BILIRUBIN URINE: ABNORMAL
BILIRUBIN URINE: ABNORMAL
BILIRUBIN URINE: NEGATIVE
BLOOD CULTURE, ROUTINE: NORMAL
BLOOD CULTURE, ROUTINE: NORMAL
BLOOD, URINE: ABNORMAL
BLOOD, URINE: NEGATIVE
BORDETELLA PARAPERTUSSIS BY PCR: NOT DETECTED
BORDETELLA PERTUSSIS BY PCR: NOT DETECTED
BUN BLDV-MCNC: 11 MG/DL (ref 8–23)
BUN BLDV-MCNC: 12 MG/DL (ref 8–23)
BUN BLDV-MCNC: 12 MG/DL (ref 8–23)
BUN BLDV-MCNC: 14 MG/DL (ref 8–23)
BUN BLDV-MCNC: 16 MG/DL (ref 8–23)
BUN BLDV-MCNC: 19 MG/DL (ref 8–23)
BUN BLDV-MCNC: 20 MG/DL (ref 8–23)
BUN BLDV-MCNC: 20 MG/DL (ref 8–23)
BUN BLDV-MCNC: 21 MG/DL (ref 8–23)
BUN BLDV-MCNC: 22 MG/DL (ref 8–23)
BUN BLDV-MCNC: 22 MG/DL (ref 8–23)
BUN BLDV-MCNC: 23 MG/DL (ref 8–23)
BUN BLDV-MCNC: 24 MG/DL (ref 8–23)
BUN BLDV-MCNC: 24 MG/DL (ref 8–23)
BUN BLDV-MCNC: 26 MG/DL (ref 8–23)
BUN BLDV-MCNC: 30 MG/DL (ref 8–23)
BUN BLDV-MCNC: 31 MG/DL (ref 8–23)
BUN BLDV-MCNC: 32 MG/DL (ref 8–23)
CALCIUM SERPL-MCNC: 10 MG/DL (ref 8.6–10.2)
CALCIUM SERPL-MCNC: 10.3 MG/DL (ref 8.6–10.2)
CALCIUM SERPL-MCNC: 8.6 MG/DL (ref 8.6–10.2)
CALCIUM SERPL-MCNC: 8.6 MG/DL (ref 8.6–10.2)
CALCIUM SERPL-MCNC: 8.7 MG/DL (ref 8.6–10.2)
CALCIUM SERPL-MCNC: 8.9 MG/DL (ref 8.6–10.2)
CALCIUM SERPL-MCNC: 9 MG/DL (ref 8.6–10.2)
CALCIUM SERPL-MCNC: 9 MG/DL (ref 8.6–10.2)
CALCIUM SERPL-MCNC: 9.2 MG/DL (ref 8.6–10.2)
CALCIUM SERPL-MCNC: 9.3 MG/DL (ref 8.6–10.2)
CALCIUM SERPL-MCNC: 9.3 MG/DL (ref 8.6–10.2)
CALCIUM SERPL-MCNC: 9.4 MG/DL (ref 8.6–10.2)
CALCIUM SERPL-MCNC: 9.6 MG/DL (ref 8.6–10.2)
CALCIUM SERPL-MCNC: 9.7 MG/DL (ref 8.6–10.2)
CALCIUM SERPL-MCNC: 9.8 MG/DL (ref 8.6–10.2)
CALCIUM SERPL-MCNC: 9.9 MG/DL (ref 8.6–10.2)
CALCIUM SERPL-MCNC: 9.9 MG/DL (ref 8.6–10.2)
CHLAMYDOPHILIA PNEUMONIAE BY PCR: NOT DETECTED
CHLORIDE BLD-SCNC: 101 MMOL/L (ref 98–107)
CHLORIDE BLD-SCNC: 102 MMOL/L (ref 98–107)
CHLORIDE BLD-SCNC: 102 MMOL/L (ref 98–107)
CHLORIDE BLD-SCNC: 103 MMOL/L (ref 98–107)
CHLORIDE BLD-SCNC: 104 MMOL/L (ref 98–107)
CHLORIDE BLD-SCNC: 105 MMOL/L (ref 98–107)
CHLORIDE BLD-SCNC: 106 MMOL/L (ref 98–107)
CHLORIDE BLD-SCNC: 108 MMOL/L (ref 98–107)
CHLORIDE BLD-SCNC: 109 MMOL/L (ref 98–107)
CHLORIDE BLD-SCNC: 110 MMOL/L (ref 98–107)
CHLORIDE BLD-SCNC: 98 MMOL/L (ref 98–107)
CHOLESTEROL, TOTAL: 65 MG/DL (ref 0–199)
CHOLESTEROL, TOTAL: 91 MG/DL (ref 0–199)
CLARITY: ABNORMAL
CLARITY: CLEAR
CLARITY: CLEAR
CO2: 15 MMOL/L (ref 22–29)
CO2: 17 MMOL/L (ref 22–29)
CO2: 20 MMOL/L (ref 22–29)
CO2: 20 MMOL/L (ref 22–29)
CO2: 22 MMOL/L (ref 22–29)
CO2: 23 MMOL/L (ref 22–29)
CO2: 24 MMOL/L (ref 22–29)
CO2: 25 MMOL/L (ref 22–29)
CO2: 26 MMOL/L (ref 22–29)
CO2: 26 MMOL/L (ref 22–29)
CO2: 27 MMOL/L (ref 22–29)
CO2: 28 MMOL/L (ref 22–29)
COHB: 0.1 % (ref 0–1.5)
COHB: 0.2 % (ref 0–1.5)
COHB: 0.3 % (ref 0–1.5)
COHB: 0.5 % (ref 0–1.5)
COHB: 0.5 % (ref 0–1.5)
COHB: 0.6 % (ref 0–1.5)
COLOR: ABNORMAL
COLOR: YELLOW
CORONAVIRUS 229E BY PCR: NOT DETECTED
CORONAVIRUS HKU1 BY PCR: NOT DETECTED
CORONAVIRUS NL63 BY PCR: NOT DETECTED
CORONAVIRUS OC43 BY PCR: NOT DETECTED
CORTISOL TOTAL: 12.21 MCG/DL (ref 2.68–18.4)
CREAT SERPL-MCNC: 0.8 MG/DL (ref 0.7–1.2)
CREAT SERPL-MCNC: 0.9 MG/DL (ref 0.7–1.2)
CREAT SERPL-MCNC: 1 MG/DL (ref 0.7–1.2)
CREAT SERPL-MCNC: 1 MG/DL (ref 0.7–1.2)
CREAT SERPL-MCNC: 1.1 MG/DL (ref 0.7–1.2)
CREAT SERPL-MCNC: 1.2 MG/DL (ref 0.7–1.2)
CREAT SERPL-MCNC: 1.7 MG/DL (ref 0.7–1.2)
CRITICAL: ABNORMAL
CULTURE, BLOOD 2: NORMAL
CULTURE, BLOOD 2: NORMAL
DATE ANALYZED: ABNORMAL
DATE OF COLLECTION: ABNORMAL
DIGOXIN LEVEL: 0.8 NG/ML (ref 0.8–2)
DIGOXIN LEVEL: 1 NG/ML (ref 0.8–2)
EKG ATRIAL RATE: 115 BPM
EKG ATRIAL RATE: 17 BPM
EKG ATRIAL RATE: 63 BPM
EKG ATRIAL RATE: 90 BPM
EKG Q-T INTERVAL: 380 MS
EKG Q-T INTERVAL: 410 MS
EKG Q-T INTERVAL: 430 MS
EKG Q-T INTERVAL: 434 MS
EKG QRS DURATION: 120 MS
EKG QRS DURATION: 144 MS
EKG QRS DURATION: 146 MS
EKG QRS DURATION: 154 MS
EKG QTC CALCULATION (BAZETT): 450 MS
EKG QTC CALCULATION (BAZETT): 478 MS
EKG QTC CALCULATION (BAZETT): 534 MS
EKG QTC CALCULATION (BAZETT): 534 MS
EKG R AXIS: -23 DEGREES
EKG R AXIS: -42 DEGREES
EKG R AXIS: -67 DEGREES
EKG R AXIS: 23 DEGREES
EKG T AXIS: -102 DEGREES
EKG T AXIS: -154 DEGREES
EKG T AXIS: 144 DEGREES
EKG T AXIS: 146 DEGREES
EKG VENTRICULAR RATE: 102 BPM
EKG VENTRICULAR RATE: 119 BPM
EKG VENTRICULAR RATE: 66 BPM
EKG VENTRICULAR RATE: 73 BPM
EOSINOPHILS ABSOLUTE: 0.12 E9/L (ref 0.05–0.5)
EOSINOPHILS ABSOLUTE: 0.14 E9/L (ref 0.05–0.5)
EOSINOPHILS ABSOLUTE: 0.17 E9/L (ref 0.05–0.5)
EOSINOPHILS ABSOLUTE: 0.2 E9/L (ref 0.05–0.5)
EOSINOPHILS ABSOLUTE: 0.24 E9/L (ref 0.05–0.5)
EOSINOPHILS ABSOLUTE: 0.25 E9/L (ref 0.05–0.5)
EOSINOPHILS RELATIVE PERCENT: 1.1 % (ref 0–6)
EOSINOPHILS RELATIVE PERCENT: 2.1 % (ref 0–6)
EOSINOPHILS RELATIVE PERCENT: 2.1 % (ref 0–6)
EOSINOPHILS RELATIVE PERCENT: 2.9 % (ref 0–6)
EOSINOPHILS RELATIVE PERCENT: 3.4 % (ref 0–6)
EOSINOPHILS RELATIVE PERCENT: 4.2 % (ref 0–6)
FIO2: 40 %
GFR AFRICAN AMERICAN: 47
GFR AFRICAN AMERICAN: >60
GFR NON-AFRICAN AMERICAN: 39 ML/MIN/1.73
GFR NON-AFRICAN AMERICAN: 59 ML/MIN/1.73
GFR NON-AFRICAN AMERICAN: >60 ML/MIN/1.73
GLUCOSE BLD-MCNC: 104 MG/DL (ref 74–99)
GLUCOSE BLD-MCNC: 108 MG/DL (ref 74–99)
GLUCOSE BLD-MCNC: 115 MG/DL (ref 74–99)
GLUCOSE BLD-MCNC: 121 MG/DL (ref 74–99)
GLUCOSE BLD-MCNC: 133 MG/DL (ref 74–99)
GLUCOSE BLD-MCNC: 143 MG/DL (ref 74–99)
GLUCOSE BLD-MCNC: 146 MG/DL (ref 74–99)
GLUCOSE BLD-MCNC: 146 MG/DL (ref 74–99)
GLUCOSE BLD-MCNC: 161 MG/DL (ref 74–99)
GLUCOSE BLD-MCNC: 161 MG/DL (ref 74–99)
GLUCOSE BLD-MCNC: 165 MG/DL (ref 74–99)
GLUCOSE BLD-MCNC: 173 MG/DL (ref 74–99)
GLUCOSE BLD-MCNC: 174 MG/DL (ref 74–99)
GLUCOSE BLD-MCNC: 194 MG/DL (ref 74–99)
GLUCOSE BLD-MCNC: 202 MG/DL (ref 74–99)
GLUCOSE BLD-MCNC: 217 MG/DL (ref 74–99)
GLUCOSE BLD-MCNC: 218 MG/DL (ref 74–99)
GLUCOSE BLD-MCNC: 340 MG/DL (ref 74–99)
GLUCOSE BLD-MCNC: 70 MG/DL (ref 74–99)
GLUCOSE BLD-MCNC: 75 MG/DL (ref 74–99)
GLUCOSE BLD-MCNC: 96 MG/DL (ref 74–99)
GLUCOSE URINE: 100 MG/DL
GLUCOSE URINE: 250 MG/DL
GLUCOSE URINE: NEGATIVE MG/DL
HBA1C MFR BLD: 6 % (ref 4–5.6)
HBA1C MFR BLD: 6.4 % (ref 4–5.6)
HBA1C MFR BLD: 6.5 % (ref 4–5.6)
HCO3: 19.1 MMOL/L (ref 22–26)
HCO3: 19.6 MMOL/L (ref 22–26)
HCO3: 20.6 MMOL/L (ref 22–26)
HCO3: 21.1 MMOL/L (ref 22–26)
HCO3: 23.9 MMOL/L (ref 22–26)
HCO3: 26.4 MMOL/L (ref 22–26)
HCO3: 26.9 MMOL/L (ref 22–26)
HCO3: 30.1 MMOL/L (ref 22–26)
HCT VFR BLD CALC: 30.9 % (ref 37–54)
HCT VFR BLD CALC: 32 % (ref 37–54)
HCT VFR BLD CALC: 32.1 % (ref 37–54)
HCT VFR BLD CALC: 32.9 % (ref 37–54)
HCT VFR BLD CALC: 34 % (ref 37–54)
HCT VFR BLD CALC: 34.1 % (ref 37–54)
HCT VFR BLD CALC: 34.4 % (ref 37–54)
HCT VFR BLD CALC: 34.5 % (ref 37–54)
HCT VFR BLD CALC: 34.7 % (ref 37–54)
HCT VFR BLD CALC: 34.7 % (ref 37–54)
HCT VFR BLD CALC: 35.4 % (ref 37–54)
HCT VFR BLD CALC: 35.4 % (ref 37–54)
HCT VFR BLD CALC: 35.8 % (ref 37–54)
HCT VFR BLD CALC: 35.9 % (ref 37–54)
HCT VFR BLD CALC: 36.3 % (ref 37–54)
HCT VFR BLD CALC: 36.3 % (ref 37–54)
HCT VFR BLD CALC: 36.9 % (ref 37–54)
HCT VFR BLD CALC: 37 % (ref 37–54)
HCT VFR BLD CALC: 37.2 % (ref 37–54)
HCT VFR BLD CALC: 37.7 % (ref 37–54)
HCT VFR BLD CALC: 39.7 % (ref 37–54)
HCT VFR BLD CALC: 41.9 % (ref 37–54)
HDLC SERPL-MCNC: 26 MG/DL
HDLC SERPL-MCNC: 35 MG/DL
HEMOGLOBIN: 10.1 G/DL (ref 12.5–16.5)
HEMOGLOBIN: 10.8 G/DL (ref 12.5–16.5)
HEMOGLOBIN: 10.9 G/DL (ref 12.5–16.5)
HEMOGLOBIN: 11 G/DL (ref 12.5–16.5)
HEMOGLOBIN: 11 G/DL (ref 12.5–16.5)
HEMOGLOBIN: 11.1 G/DL (ref 12.5–16.5)
HEMOGLOBIN: 11.3 G/DL (ref 12.5–16.5)
HEMOGLOBIN: 11.4 G/DL (ref 12.5–16.5)
HEMOGLOBIN: 11.4 G/DL (ref 12.5–16.5)
HEMOGLOBIN: 11.5 G/DL (ref 12.5–16.5)
HEMOGLOBIN: 11.7 G/DL (ref 12.5–16.5)
HEMOGLOBIN: 11.7 G/DL (ref 12.5–16.5)
HEMOGLOBIN: 11.8 G/DL (ref 12.5–16.5)
HEMOGLOBIN: 12 G/DL (ref 12.5–16.5)
HEMOGLOBIN: 12.1 G/DL (ref 12.5–16.5)
HEMOGLOBIN: 12.1 G/DL (ref 12.5–16.5)
HEMOGLOBIN: 12.2 G/DL (ref 12.5–16.5)
HEMOGLOBIN: 12.2 G/DL (ref 12.5–16.5)
HEMOGLOBIN: 12.3 G/DL (ref 12.5–16.5)
HEMOGLOBIN: 12.6 G/DL (ref 12.5–16.5)
HEMOGLOBIN: 12.9 G/DL (ref 12.5–16.5)
HEMOGLOBIN: 13.7 G/DL (ref 12.5–16.5)
HHB: 1.7 % (ref 0–5)
HHB: 2 % (ref 0–5)
HHB: 2.8 % (ref 0–5)
HHB: 3.2 % (ref 0–5)
HHB: 3.3 % (ref 0–5)
HHB: 3.6 % (ref 0–5)
HHB: 5.3 % (ref 0–5)
HHB: 6.4 % (ref 0–5)
HUMAN METAPNEUMOVIRUS BY PCR: NOT DETECTED
HUMAN RHINOVIRUS/ENTEROVIRUS BY PCR: NOT DETECTED
IMMATURE GRANULOCYTES #: 0.01 E9/L
IMMATURE GRANULOCYTES #: 0.01 E9/L
IMMATURE GRANULOCYTES #: 0.02 E9/L
IMMATURE GRANULOCYTES #: 0.02 E9/L
IMMATURE GRANULOCYTES #: 0.03 E9/L
IMMATURE GRANULOCYTES #: 0.06 E9/L
IMMATURE GRANULOCYTES %: 0.2 % (ref 0–5)
IMMATURE GRANULOCYTES %: 0.2 % (ref 0–5)
IMMATURE GRANULOCYTES %: 0.3 % (ref 0–5)
IMMATURE GRANULOCYTES %: 0.3 % (ref 0–5)
IMMATURE GRANULOCYTES %: 0.4 % (ref 0–5)
IMMATURE GRANULOCYTES %: 0.5 % (ref 0–5)
INFLUENZA A BY PCR: NOT DETECTED
INFLUENZA B BY PCR: NOT DETECTED
INR BLD: 1.3
INR BLD: 1.4
KETONES, URINE: 15 MG/DL
KETONES, URINE: ABNORMAL MG/DL
KETONES, URINE: ABNORMAL MG/DL
KETONES, URINE: NEGATIVE MG/DL
KETONES, URINE: NEGATIVE MG/DL
LAB: ABNORMAL
LACTIC ACID: 2 MMOL/L (ref 0.5–2.2)
LDL CHOLESTEROL CALCULATED: 16 MG/DL (ref 0–99)
LDL CHOLESTEROL CALCULATED: 41 MG/DL (ref 0–99)
LEUKOCYTE ESTERASE, URINE: ABNORMAL
LEUKOCYTE ESTERASE, URINE: NEGATIVE
LV EF: 18 %
LVEF MODALITY: NORMAL
LYMPHOCYTES ABSOLUTE: 0.85 E9/L (ref 1.5–4)
LYMPHOCYTES ABSOLUTE: 1.22 E9/L (ref 1.5–4)
LYMPHOCYTES ABSOLUTE: 1.25 E9/L (ref 1.5–4)
LYMPHOCYTES ABSOLUTE: 1.28 E9/L (ref 1.5–4)
LYMPHOCYTES ABSOLUTE: 1.51 E9/L (ref 1.5–4)
LYMPHOCYTES ABSOLUTE: 1.86 E9/L (ref 1.5–4)
LYMPHOCYTES RELATIVE PERCENT: 14.9 % (ref 20–42)
LYMPHOCYTES RELATIVE PERCENT: 15.6 % (ref 20–42)
LYMPHOCYTES RELATIVE PERCENT: 18.6 % (ref 20–42)
LYMPHOCYTES RELATIVE PERCENT: 25.4 % (ref 20–42)
LYMPHOCYTES RELATIVE PERCENT: 26.5 % (ref 20–42)
LYMPHOCYTES RELATIVE PERCENT: 9.6 % (ref 20–42)
Lab: ABNORMAL
MAGNESIUM: 1.2 MG/DL (ref 1.6–2.6)
MAGNESIUM: 1.5 MG/DL (ref 1.6–2.6)
MAGNESIUM: 1.7 MG/DL (ref 1.6–2.6)
MAGNESIUM: 1.8 MG/DL (ref 1.6–2.6)
MAGNESIUM: 1.9 MG/DL (ref 1.6–2.6)
MCH RBC QN AUTO: 31.4 PG (ref 26–35)
MCH RBC QN AUTO: 31.5 PG (ref 26–35)
MCH RBC QN AUTO: 31.5 PG (ref 26–35)
MCH RBC QN AUTO: 31.6 PG (ref 26–35)
MCH RBC QN AUTO: 31.7 PG (ref 26–35)
MCH RBC QN AUTO: 31.7 PG (ref 26–35)
MCH RBC QN AUTO: 31.8 PG (ref 26–35)
MCH RBC QN AUTO: 31.8 PG (ref 26–35)
MCH RBC QN AUTO: 31.9 PG (ref 26–35)
MCH RBC QN AUTO: 32 PG (ref 26–35)
MCH RBC QN AUTO: 32.1 PG (ref 26–35)
MCH RBC QN AUTO: 32.2 PG (ref 26–35)
MCH RBC QN AUTO: 32.5 PG (ref 26–35)
MCH RBC QN AUTO: 32.5 PG (ref 26–35)
MCH RBC QN AUTO: 32.7 PG (ref 26–35)
MCH RBC QN AUTO: 32.8 PG (ref 26–35)
MCH RBC QN AUTO: 32.9 PG (ref 26–35)
MCHC RBC AUTO-ENTMCNC: 32.2 % (ref 32–34.5)
MCHC RBC AUTO-ENTMCNC: 32.3 % (ref 32–34.5)
MCHC RBC AUTO-ENTMCNC: 32.4 % (ref 32–34.5)
MCHC RBC AUTO-ENTMCNC: 32.5 % (ref 32–34.5)
MCHC RBC AUTO-ENTMCNC: 32.6 % (ref 32–34.5)
MCHC RBC AUTO-ENTMCNC: 32.7 % (ref 32–34.5)
MCHC RBC AUTO-ENTMCNC: 32.7 % (ref 32–34.5)
MCHC RBC AUTO-ENTMCNC: 32.8 % (ref 32–34.5)
MCHC RBC AUTO-ENTMCNC: 32.9 % (ref 32–34.5)
MCHC RBC AUTO-ENTMCNC: 33.1 % (ref 32–34.5)
MCHC RBC AUTO-ENTMCNC: 33.2 % (ref 32–34.5)
MCHC RBC AUTO-ENTMCNC: 33.4 % (ref 32–34.5)
MCHC RBC AUTO-ENTMCNC: 33.5 % (ref 32–34.5)
MCHC RBC AUTO-ENTMCNC: 33.6 % (ref 32–34.5)
MCHC RBC AUTO-ENTMCNC: 33.7 % (ref 32–34.5)
MCHC RBC AUTO-ENTMCNC: 33.8 % (ref 32–34.5)
MCHC RBC AUTO-ENTMCNC: 33.8 % (ref 32–34.5)
MCHC RBC AUTO-ENTMCNC: 33.9 % (ref 32–34.5)
MCHC RBC AUTO-ENTMCNC: 34 % (ref 32–34.5)
MCHC RBC AUTO-ENTMCNC: 34.3 % (ref 32–34.5)
MCV RBC AUTO: 93.7 FL (ref 80–99.9)
MCV RBC AUTO: 95.2 FL (ref 80–99.9)
MCV RBC AUTO: 95.2 FL (ref 80–99.9)
MCV RBC AUTO: 95.3 FL (ref 80–99.9)
MCV RBC AUTO: 95.8 FL (ref 80–99.9)
MCV RBC AUTO: 96.1 FL (ref 80–99.9)
MCV RBC AUTO: 96.6 FL (ref 80–99.9)
MCV RBC AUTO: 96.6 FL (ref 80–99.9)
MCV RBC AUTO: 97 FL (ref 80–99.9)
MCV RBC AUTO: 97.1 FL (ref 80–99.9)
MCV RBC AUTO: 97.2 FL (ref 80–99.9)
MCV RBC AUTO: 97.3 FL (ref 80–99.9)
MCV RBC AUTO: 97.4 FL (ref 80–99.9)
MCV RBC AUTO: 97.4 FL (ref 80–99.9)
MCV RBC AUTO: 97.7 FL (ref 80–99.9)
MCV RBC AUTO: 97.8 FL (ref 80–99.9)
MCV RBC AUTO: 98.1 FL (ref 80–99.9)
MCV RBC AUTO: 98.3 FL (ref 80–99.9)
METER GLUCOSE: 101 MG/DL (ref 74–99)
METER GLUCOSE: 107 MG/DL (ref 74–99)
METER GLUCOSE: 108 MG/DL (ref 74–99)
METER GLUCOSE: 109 MG/DL (ref 74–99)
METER GLUCOSE: 109 MG/DL (ref 74–99)
METER GLUCOSE: 111 MG/DL (ref 74–99)
METER GLUCOSE: 112 MG/DL (ref 74–99)
METER GLUCOSE: 113 MG/DL (ref 74–99)
METER GLUCOSE: 113 MG/DL (ref 74–99)
METER GLUCOSE: 114 MG/DL (ref 74–99)
METER GLUCOSE: 115 MG/DL (ref 74–99)
METER GLUCOSE: 117 MG/DL (ref 74–99)
METER GLUCOSE: 120 MG/DL (ref 74–99)
METER GLUCOSE: 122 MG/DL (ref 74–99)
METER GLUCOSE: 123 MG/DL (ref 74–99)
METER GLUCOSE: 123 MG/DL (ref 74–99)
METER GLUCOSE: 124 MG/DL (ref 74–99)
METER GLUCOSE: 124 MG/DL (ref 74–99)
METER GLUCOSE: 126 MG/DL (ref 74–99)
METER GLUCOSE: 128 MG/DL (ref 74–99)
METER GLUCOSE: 131 MG/DL (ref 74–99)
METER GLUCOSE: 134 MG/DL (ref 74–99)
METER GLUCOSE: 134 MG/DL (ref 74–99)
METER GLUCOSE: 139 MG/DL (ref 74–99)
METER GLUCOSE: 140 MG/DL (ref 74–99)
METER GLUCOSE: 143 MG/DL (ref 74–99)
METER GLUCOSE: 144 MG/DL (ref 74–99)
METER GLUCOSE: 145 MG/DL (ref 74–99)
METER GLUCOSE: 146 MG/DL (ref 74–99)
METER GLUCOSE: 150 MG/DL (ref 74–99)
METER GLUCOSE: 154 MG/DL (ref 74–99)
METER GLUCOSE: 155 MG/DL (ref 74–99)
METER GLUCOSE: 156 MG/DL (ref 74–99)
METER GLUCOSE: 157 MG/DL (ref 74–99)
METER GLUCOSE: 157 MG/DL (ref 74–99)
METER GLUCOSE: 158 MG/DL (ref 74–99)
METER GLUCOSE: 158 MG/DL (ref 74–99)
METER GLUCOSE: 159 MG/DL (ref 74–99)
METER GLUCOSE: 161 MG/DL (ref 74–99)
METER GLUCOSE: 165 MG/DL (ref 74–99)
METER GLUCOSE: 167 MG/DL (ref 74–99)
METER GLUCOSE: 171 MG/DL (ref 74–99)
METER GLUCOSE: 172 MG/DL (ref 74–99)
METER GLUCOSE: 176 MG/DL (ref 74–99)
METER GLUCOSE: 179 MG/DL (ref 74–99)
METER GLUCOSE: 180 MG/DL (ref 74–99)
METER GLUCOSE: 181 MG/DL (ref 74–99)
METER GLUCOSE: 182 MG/DL (ref 74–99)
METER GLUCOSE: 185 MG/DL (ref 74–99)
METER GLUCOSE: 188 MG/DL (ref 74–99)
METER GLUCOSE: 190 MG/DL (ref 74–99)
METER GLUCOSE: 191 MG/DL (ref 74–99)
METER GLUCOSE: 196 MG/DL (ref 74–99)
METER GLUCOSE: 198 MG/DL (ref 74–99)
METER GLUCOSE: 200 MG/DL (ref 74–99)
METER GLUCOSE: 201 MG/DL (ref 74–99)
METER GLUCOSE: 208 MG/DL (ref 74–99)
METER GLUCOSE: 212 MG/DL (ref 74–99)
METER GLUCOSE: 219 MG/DL (ref 74–99)
METER GLUCOSE: 219 MG/DL (ref 74–99)
METER GLUCOSE: 227 MG/DL (ref 74–99)
METER GLUCOSE: 227 MG/DL (ref 74–99)
METER GLUCOSE: 228 MG/DL (ref 74–99)
METER GLUCOSE: 230 MG/DL (ref 74–99)
METER GLUCOSE: 231 MG/DL (ref 74–99)
METER GLUCOSE: 244 MG/DL (ref 74–99)
METER GLUCOSE: 255 MG/DL (ref 74–99)
METER GLUCOSE: 271 MG/DL (ref 74–99)
METER GLUCOSE: 272 MG/DL (ref 74–99)
METER GLUCOSE: 278 MG/DL (ref 74–99)
METER GLUCOSE: 292 MG/DL (ref 74–99)
METER GLUCOSE: 333 MG/DL (ref 74–99)
METER GLUCOSE: 63 MG/DL (ref 74–99)
METER GLUCOSE: 64 MG/DL (ref 74–99)
METER GLUCOSE: 67 MG/DL (ref 74–99)
METER GLUCOSE: 80 MG/DL (ref 74–99)
METER GLUCOSE: 89 MG/DL (ref 74–99)
METER GLUCOSE: 91 MG/DL (ref 74–99)
METER GLUCOSE: 97 MG/DL (ref 74–99)
METER GLUCOSE: 97 MG/DL (ref 74–99)
METHB: 0 % (ref 0–1.5)
METHB: 0.2 % (ref 0–1.5)
METHB: 0.2 % (ref 0–1.5)
METHB: 0.3 % (ref 0–1.5)
METHB: 0.4 % (ref 0–1.5)
MODE: AC
MONOCYTES ABSOLUTE: 0.56 E9/L (ref 0.1–0.95)
MONOCYTES ABSOLUTE: 0.61 E9/L (ref 0.1–0.95)
MONOCYTES ABSOLUTE: 0.61 E9/L (ref 0.1–0.95)
MONOCYTES ABSOLUTE: 0.62 E9/L (ref 0.1–0.95)
MONOCYTES ABSOLUTE: 0.79 E9/L (ref 0.1–0.95)
MONOCYTES ABSOLUTE: 1.37 E9/L (ref 0.1–0.95)
MONOCYTES RELATIVE PERCENT: 10.7 % (ref 2–12)
MONOCYTES RELATIVE PERCENT: 10.7 % (ref 2–12)
MONOCYTES RELATIVE PERCENT: 10.8 % (ref 2–12)
MONOCYTES RELATIVE PERCENT: 7.8 % (ref 2–12)
MONOCYTES RELATIVE PERCENT: 8.8 % (ref 2–12)
MONOCYTES RELATIVE PERCENT: 9.8 % (ref 2–12)
MRSA CULTURE ONLY: NORMAL
MYCOPLASMA PNEUMONIAE BY PCR: NOT DETECTED
NEUTROPHILS ABSOLUTE: 3.3 E9/L (ref 1.8–7.3)
NEUTROPHILS ABSOLUTE: 4.13 E9/L (ref 1.8–7.3)
NEUTROPHILS ABSOLUTE: 4.38 E9/L (ref 1.8–7.3)
NEUTROPHILS ABSOLUTE: 4.76 E9/L (ref 1.8–7.3)
NEUTROPHILS ABSOLUTE: 5.9 E9/L (ref 1.8–7.3)
NEUTROPHILS ABSOLUTE: 9.94 E9/L (ref 1.8–7.3)
NEUTROPHILS RELATIVE PERCENT: 57.9 % (ref 43–80)
NEUTROPHILS RELATIVE PERCENT: 59.7 % (ref 43–80)
NEUTROPHILS RELATIVE PERCENT: 69 % (ref 43–80)
NEUTROPHILS RELATIVE PERCENT: 72.6 % (ref 43–80)
NEUTROPHILS RELATIVE PERCENT: 73.7 % (ref 43–80)
NEUTROPHILS RELATIVE PERCENT: 77.9 % (ref 43–80)
NITRITE, URINE: NEGATIVE
NITRITE, URINE: POSITIVE
NITRITE, URINE: POSITIVE
O2 CONTENT: 14.9 ML/DL
O2 CONTENT: 16 ML/DL
O2 CONTENT: 16.4 ML/DL
O2 CONTENT: 16.9 ML/DL
O2 CONTENT: 17.1 ML/DL
O2 CONTENT: 17.5 ML/DL
O2 CONTENT: 17.6 ML/DL
O2 CONTENT: 17.8 ML/DL
O2 SATURATION: 93.6 % (ref 92–98.5)
O2 SATURATION: 94.7 % (ref 92–98.5)
O2 SATURATION: 96.4 % (ref 92–98.5)
O2 SATURATION: 96.7 % (ref 92–98.5)
O2 SATURATION: 96.8 % (ref 92–98.5)
O2 SATURATION: 97.2 % (ref 92–98.5)
O2 SATURATION: 98 % (ref 92–98.5)
O2 SATURATION: 98.3 % (ref 92–98.5)
O2HB: 92.9 % (ref 94–97)
O2HB: 94.1 % (ref 94–97)
O2HB: 95.8 % (ref 94–97)
O2HB: 96.1 % (ref 94–97)
O2HB: 96.4 % (ref 94–97)
O2HB: 96.6 % (ref 94–97)
O2HB: 97.4 % (ref 94–97)
O2HB: 97.6 % (ref 94–97)
OPERATOR ID: 1893
OPERATOR ID: 2593
OPERATOR ID: 2593
OPERATOR ID: 2962
OPERATOR ID: 2962
OPERATOR ID: 882
OPERATOR ID: ABNORMAL
OPERATOR ID: ABNORMAL
PARAINFLUENZA VIRUS 1 BY PCR: NOT DETECTED
PARAINFLUENZA VIRUS 2 BY PCR: NOT DETECTED
PARAINFLUENZA VIRUS 3 BY PCR: NOT DETECTED
PARAINFLUENZA VIRUS 4 BY PCR: NOT DETECTED
PATIENT TEMP: 37 C
PCO2: 28.1 MMHG (ref 35–45)
PCO2: 28.8 MMHG (ref 35–45)
PCO2: 30.2 MMHG (ref 35–45)
PCO2: 30.7 MMHG (ref 35–45)
PCO2: 30.8 MMHG (ref 35–45)
PCO2: 35.1 MMHG (ref 35–45)
PCO2: 37.1 MMHG (ref 35–45)
PCO2: 38.8 MMHG (ref 35–45)
PDW BLD-RTO: 12.9 FL (ref 11.5–15)
PDW BLD-RTO: 13 FL (ref 11.5–15)
PDW BLD-RTO: 13 FL (ref 11.5–15)
PDW BLD-RTO: 13.1 FL (ref 11.5–15)
PDW BLD-RTO: 13.1 FL (ref 11.5–15)
PDW BLD-RTO: 13.2 FL (ref 11.5–15)
PDW BLD-RTO: 13.3 FL (ref 11.5–15)
PDW BLD-RTO: 13.4 FL (ref 11.5–15)
PDW BLD-RTO: 13.5 FL (ref 11.5–15)
PDW BLD-RTO: 13.5 FL (ref 11.5–15)
PDW BLD-RTO: 13.6 FL (ref 11.5–15)
PDW BLD-RTO: 13.7 FL (ref 11.5–15)
PDW BLD-RTO: 13.8 FL (ref 11.5–15)
PDW BLD-RTO: 13.9 FL (ref 11.5–15)
PDW BLD-RTO: 14 FL (ref 11.5–15)
PEEP/CPAP: 5 CMH2O
PFO2: 1.65 MMHG/%
PFO2: 1.76 MMHG/%
PFO2: 2.21 MMHG/%
PFO2: 2.23 MMHG/%
PFO2: 2.23 MMHG/%
PFO2: 2.39 MMHG/%
PFO2: 2.97 MMHG/%
PFO2: 3.19 MMHG/%
PH BLOOD GAS: 7.41 (ref 7.35–7.45)
PH BLOOD GAS: 7.45 (ref 7.35–7.45)
PH BLOOD GAS: 7.46 (ref 7.35–7.45)
PH BLOOD GAS: 7.47 (ref 7.35–7.45)
PH BLOOD GAS: 7.48 (ref 7.35–7.45)
PH BLOOD GAS: 7.5 (ref 7.35–7.45)
PH BLOOD GAS: 7.51 (ref 7.35–7.45)
PH BLOOD GAS: 7.51 (ref 7.35–7.45)
PH UA: 5 (ref 5–9)
PH UA: 5 (ref 5–9)
PH UA: 5.5 (ref 5–9)
PH UA: 5.5 (ref 5–9)
PH UA: 6.5 (ref 5–9)
PHOSPHORUS: 2.2 MG/DL (ref 2.5–4.5)
PHOSPHORUS: 2.6 MG/DL (ref 2.5–4.5)
PHOSPHORUS: 2.7 MG/DL (ref 2.5–4.5)
PHOSPHORUS: 2.7 MG/DL (ref 2.5–4.5)
PHOSPHORUS: 2.9 MG/DL (ref 2.5–4.5)
PHOSPHORUS: 3.1 MG/DL (ref 2.5–4.5)
PHOSPHORUS: 3.1 MG/DL (ref 2.5–4.5)
PHOSPHORUS: 3.2 MG/DL (ref 2.5–4.5)
PLATELET # BLD: 130 E9/L (ref 130–450)
PLATELET # BLD: 134 E9/L (ref 130–450)
PLATELET # BLD: 135 E9/L (ref 130–450)
PLATELET # BLD: 136 E9/L (ref 130–450)
PLATELET # BLD: 139 E9/L (ref 130–450)
PLATELET # BLD: 139 E9/L (ref 130–450)
PLATELET # BLD: 142 E9/L (ref 130–450)
PLATELET # BLD: 144 E9/L (ref 130–450)
PLATELET # BLD: 157 E9/L (ref 130–450)
PLATELET # BLD: 159 E9/L (ref 130–450)
PLATELET # BLD: 163 E9/L (ref 130–450)
PLATELET # BLD: 164 E9/L (ref 130–450)
PLATELET # BLD: 175 E9/L (ref 130–450)
PLATELET # BLD: 177 E9/L (ref 130–450)
PLATELET # BLD: 182 E9/L (ref 130–450)
PLATELET # BLD: 183 E9/L (ref 130–450)
PLATELET # BLD: 185 E9/L (ref 130–450)
PLATELET # BLD: 185 E9/L (ref 130–450)
PLATELET # BLD: 202 E9/L (ref 130–450)
PLATELET # BLD: 205 E9/L (ref 130–450)
PLATELET # BLD: 206 E9/L (ref 130–450)
PLATELET # BLD: 208 E9/L (ref 130–450)
PMV BLD AUTO: 10.9 FL (ref 7–12)
PMV BLD AUTO: 11.2 FL (ref 7–12)
PMV BLD AUTO: 11.2 FL (ref 7–12)
PMV BLD AUTO: 11.3 FL (ref 7–12)
PMV BLD AUTO: 11.4 FL (ref 7–12)
PMV BLD AUTO: 11.5 FL (ref 7–12)
PMV BLD AUTO: 11.5 FL (ref 7–12)
PMV BLD AUTO: 11.6 FL (ref 7–12)
PMV BLD AUTO: 11.6 FL (ref 7–12)
PMV BLD AUTO: 11.7 FL (ref 7–12)
PMV BLD AUTO: 11.8 FL (ref 7–12)
PMV BLD AUTO: 11.8 FL (ref 7–12)
PMV BLD AUTO: 11.9 FL (ref 7–12)
PMV BLD AUTO: 12 FL (ref 7–12)
PMV BLD AUTO: 12.1 FL (ref 7–12)
PMV BLD AUTO: 12.2 FL (ref 7–12)
PMV BLD AUTO: 12.2 FL (ref 7–12)
PO2: 118.9 MMHG (ref 75–100)
PO2: 127.7 MMHG (ref 75–100)
PO2: 66.1 MMHG (ref 75–100)
PO2: 70.5 MMHG (ref 75–100)
PO2: 88.4 MMHG (ref 75–100)
PO2: 89.3 MMHG (ref 75–100)
PO2: 89.3 MMHG (ref 75–100)
PO2: 95.7 MMHG (ref 75–100)
POTASSIUM REFLEX MAGNESIUM: 3.9 MMOL/L (ref 3.5–5)
POTASSIUM REFLEX MAGNESIUM: 4.4 MMOL/L (ref 3.5–5)
POTASSIUM REFLEX MAGNESIUM: 4.7 MMOL/L (ref 3.5–5)
POTASSIUM SERPL-SCNC: 3.7 MMOL/L (ref 3.5–5)
POTASSIUM SERPL-SCNC: 3.8 MMOL/L (ref 3.5–5)
POTASSIUM SERPL-SCNC: 3.8 MMOL/L (ref 3.5–5)
POTASSIUM SERPL-SCNC: 3.9 MMOL/L (ref 3.5–5)
POTASSIUM SERPL-SCNC: 4 MMOL/L (ref 3.5–5)
POTASSIUM SERPL-SCNC: 4 MMOL/L (ref 3.5–5)
POTASSIUM SERPL-SCNC: 4.1 MMOL/L (ref 3.5–5)
POTASSIUM SERPL-SCNC: 4.1 MMOL/L (ref 3.5–5)
POTASSIUM SERPL-SCNC: 4.2 MMOL/L (ref 3.5–5)
POTASSIUM SERPL-SCNC: 4.2 MMOL/L (ref 3.5–5)
POTASSIUM SERPL-SCNC: 4.5 MMOL/L (ref 3.5–5)
POTASSIUM SERPL-SCNC: 4.5 MMOL/L (ref 3.5–5)
POTASSIUM SERPL-SCNC: 4.6 MMOL/L (ref 3.5–5)
POTASSIUM SERPL-SCNC: 4.7 MMOL/L (ref 3.5–5)
POTASSIUM SERPL-SCNC: 4.9 MMOL/L (ref 3.5–5)
POTASSIUM SERPL-SCNC: 5.1 MMOL/L (ref 3.5–5)
PRO-BNP: 5373 PG/ML (ref 0–450)
PRO-BNP: 5400 PG/ML (ref 0–450)
PRO-BNP: 5912 PG/ML (ref 0–450)
PROCALCITONIN: <0.02 NG/ML (ref 0–0.08)
PROSTATE SPECIFIC ANTIGEN: 2.39 NG/ML (ref 0–4)
PROTEIN UA: 100 MG/DL
PROTEIN UA: 30 MG/DL
PROTEIN UA: >=300 MG/DL
PROTEIN UA: >=300 MG/DL
PROTEIN UA: NEGATIVE MG/DL
PROTHROMBIN TIME: 14.2 SEC (ref 9.3–12.4)
PROTHROMBIN TIME: 16.7 SEC (ref 9.3–12.4)
RBC # BLD: 3.15 E12/L (ref 3.8–5.8)
RBC # BLD: 3.29 E12/L (ref 3.8–5.8)
RBC # BLD: 3.35 E12/L (ref 3.8–5.8)
RBC # BLD: 3.38 E12/L (ref 3.8–5.8)
RBC # BLD: 3.47 E12/L (ref 3.8–5.8)
RBC # BLD: 3.5 E12/L (ref 3.8–5.8)
RBC # BLD: 3.51 E12/L (ref 3.8–5.8)
RBC # BLD: 3.55 E12/L (ref 3.8–5.8)
RBC # BLD: 3.57 E12/L (ref 3.8–5.8)
RBC # BLD: 3.6 E12/L (ref 3.8–5.8)
RBC # BLD: 3.65 E12/L (ref 3.8–5.8)
RBC # BLD: 3.67 E12/L (ref 3.8–5.8)
RBC # BLD: 3.73 E12/L (ref 3.8–5.8)
RBC # BLD: 3.76 E12/L (ref 3.8–5.8)
RBC # BLD: 3.77 E12/L (ref 3.8–5.8)
RBC # BLD: 3.81 E12/L (ref 3.8–5.8)
RBC # BLD: 3.83 E12/L (ref 3.8–5.8)
RBC # BLD: 3.84 E12/L (ref 3.8–5.8)
RBC # BLD: 3.85 E12/L (ref 3.8–5.8)
RBC # BLD: 3.87 E12/L (ref 3.8–5.8)
RBC # BLD: 4.06 E12/L (ref 3.8–5.8)
RBC # BLD: 4.3 E12/L (ref 3.8–5.8)
RBC # BLD: NORMAL 10*6/UL
RBC UA: >20 /HPF (ref 0–2)
RBC UA: >20 /HPF (ref 0–2)
RBC UA: ABNORMAL /HPF (ref 0–2)
RBC UA: NORMAL /HPF (ref 0–2)
RBC UA: NORMAL /HPF (ref 0–2)
RESPIRATORY SYNCYTIAL VIRUS BY PCR: NOT DETECTED
RI(T): 0.88
RI(T): 0.96
RI(T): 1.45
RI(T): 1.61
RI(T): 1.69
RI(T): 1.69
RI(T): 2.45
RI(T): 2.66
RR MECHANICAL: 14 B/MIN
RR MECHANICAL: 16 B/MIN
SODIUM BLD-SCNC: 134 MMOL/L (ref 132–146)
SODIUM BLD-SCNC: 136 MMOL/L (ref 132–146)
SODIUM BLD-SCNC: 137 MMOL/L (ref 132–146)
SODIUM BLD-SCNC: 138 MMOL/L (ref 132–146)
SODIUM BLD-SCNC: 139 MMOL/L (ref 132–146)
SODIUM BLD-SCNC: 140 MMOL/L (ref 132–146)
SODIUM BLD-SCNC: 141 MMOL/L (ref 132–146)
SODIUM BLD-SCNC: 142 MMOL/L (ref 132–146)
SODIUM BLD-SCNC: 143 MMOL/L (ref 132–146)
SODIUM BLD-SCNC: 144 MMOL/L (ref 132–146)
SODIUM BLD-SCNC: 145 MMOL/L (ref 132–146)
SODIUM BLD-SCNC: 146 MMOL/L (ref 132–146)
SODIUM BLD-SCNC: 148 MMOL/L (ref 132–146)
SOURCE, BLOOD GAS: ABNORMAL
SPECIFIC GRAVITY UA: 1.02 (ref 1–1.03)
SPECIFIC GRAVITY UA: >=1.03 (ref 1–1.03)
SPECIFIC GRAVITY UA: >=1.03 (ref 1–1.03)
THB: 11 G/DL (ref 11.5–16.5)
THB: 12 G/DL (ref 11.5–16.5)
THB: 12.1 G/DL (ref 11.5–16.5)
THB: 12.6 G/DL (ref 11.5–16.5)
THB: 12.6 G/DL (ref 11.5–16.5)
THB: 12.7 G/DL (ref 11.5–16.5)
THB: 12.9 G/DL (ref 11.5–16.5)
THB: 13.1 G/DL (ref 11.5–16.5)
TIME ANALYZED: 1033
TIME ANALYZED: 434
TIME ANALYZED: 446
TIME ANALYZED: 453
TIME ANALYZED: 509
TIME ANALYZED: 524
TIME ANALYZED: 536
TIME ANALYZED: 952
TOTAL PROTEIN: 5.1 G/DL (ref 6.4–8.3)
TOTAL PROTEIN: 5.2 G/DL (ref 6.4–8.3)
TOTAL PROTEIN: 5.3 G/DL (ref 6.4–8.3)
TOTAL PROTEIN: 5.4 G/DL (ref 6.4–8.3)
TOTAL PROTEIN: 5.4 G/DL (ref 6.4–8.3)
TOTAL PROTEIN: 5.5 G/DL (ref 6.4–8.3)
TOTAL PROTEIN: 5.9 G/DL (ref 6.4–8.3)
TOTAL PROTEIN: 6.4 G/DL (ref 6.4–8.3)
TOTAL PROTEIN: 6.7 G/DL (ref 6.4–8.3)
TOTAL PROTEIN: 7.1 G/DL (ref 6.4–8.3)
TRIGL SERPL-MCNC: 115 MG/DL (ref 0–149)
TRIGL SERPL-MCNC: 74 MG/DL (ref 0–149)
TROPONIN: 0.03 NG/ML (ref 0–0.03)
TROPONIN: 0.05 NG/ML (ref 0–0.03)
URINE CULTURE, ROUTINE: NORMAL
UROBILINOGEN, URINE: 0.2 E.U./DL
UROBILINOGEN, URINE: 2 E.U./DL
UROBILINOGEN, URINE: 4 E.U./DL
VLDLC SERPL CALC-MCNC: 15 MG/DL
VLDLC SERPL CALC-MCNC: 23 MG/DL
VT MECHANICAL: 450 ML
VT MECHANICAL: 500 ML
WBC # BLD: 12.8 E9/L (ref 4.5–11.5)
WBC # BLD: 5.6 E9/L (ref 4.5–11.5)
WBC # BLD: 5.7 E9/L (ref 4.5–11.5)
WBC # BLD: 5.7 E9/L (ref 4.5–11.5)
WBC # BLD: 6.2 E9/L (ref 4.5–11.5)
WBC # BLD: 6.5 E9/L (ref 4.5–11.5)
WBC # BLD: 6.7 E9/L (ref 4.5–11.5)
WBC # BLD: 6.8 E9/L (ref 4.5–11.5)
WBC # BLD: 6.8 E9/L (ref 4.5–11.5)
WBC # BLD: 6.9 E9/L (ref 4.5–11.5)
WBC # BLD: 6.9 E9/L (ref 4.5–11.5)
WBC # BLD: 7.3 E9/L (ref 4.5–11.5)
WBC # BLD: 7.4 E9/L (ref 4.5–11.5)
WBC # BLD: 7.5 E9/L (ref 4.5–11.5)
WBC # BLD: 7.5 E9/L (ref 4.5–11.5)
WBC # BLD: 7.7 E9/L (ref 4.5–11.5)
WBC # BLD: 7.7 E9/L (ref 4.5–11.5)
WBC # BLD: 7.8 E9/L (ref 4.5–11.5)
WBC # BLD: 8 E9/L (ref 4.5–11.5)
WBC # BLD: 8.9 E9/L (ref 4.5–11.5)
WBC UA: ABNORMAL /HPF (ref 0–5)
WBC UA: NORMAL /HPF (ref 0–5)
WBC UA: NORMAL /HPF (ref 0–5)

## 2020-01-01 PROCEDURE — 71045 X-RAY EXAM CHEST 1 VIEW: CPT

## 2020-01-01 PROCEDURE — 6360000002 HC RX W HCPCS: Performed by: NURSE PRACTITIONER

## 2020-01-01 PROCEDURE — 2580000003 HC RX 258: Performed by: NURSE PRACTITIONER

## 2020-01-01 PROCEDURE — 87088 URINE BACTERIA CULTURE: CPT

## 2020-01-01 PROCEDURE — 99232 SBSQ HOSP IP/OBS MODERATE 35: CPT | Performed by: INTERNAL MEDICINE

## 2020-01-01 PROCEDURE — 36620 INSERTION CATHETER ARTERY: CPT

## 2020-01-01 PROCEDURE — 82962 GLUCOSE BLOOD TEST: CPT

## 2020-01-01 PROCEDURE — G8427 DOCREV CUR MEDS BY ELIG CLIN: HCPCS | Performed by: INTERNAL MEDICINE

## 2020-01-01 PROCEDURE — 82805 BLOOD GASES W/O2 SATURATION: CPT

## 2020-01-01 PROCEDURE — 85025 COMPLETE CBC W/AUTO DIFF WBC: CPT

## 2020-01-01 PROCEDURE — 81001 URINALYSIS AUTO W/SCOPE: CPT

## 2020-01-01 PROCEDURE — 95822 EEG COMA OR SLEEP ONLY: CPT

## 2020-01-01 PROCEDURE — 6370000000 HC RX 637 (ALT 250 FOR IP): Performed by: NURSE PRACTITIONER

## 2020-01-01 PROCEDURE — APPSS180 APP SPLIT SHARED TIME > 60 MINUTES: Performed by: NURSE PRACTITIONER

## 2020-01-01 PROCEDURE — 36415 COLL VENOUS BLD VENIPUNCTURE: CPT

## 2020-01-01 PROCEDURE — 6370000000 HC RX 637 (ALT 250 FOR IP): Performed by: INTERNAL MEDICINE

## 2020-01-01 PROCEDURE — 2060000000 HC ICU INTERMEDIATE R&B

## 2020-01-01 PROCEDURE — 72141 MRI NECK SPINE W/O DYE: CPT

## 2020-01-01 PROCEDURE — 87040 BLOOD CULTURE FOR BACTERIA: CPT

## 2020-01-01 PROCEDURE — 99291 CRITICAL CARE FIRST HOUR: CPT | Performed by: SURGERY

## 2020-01-01 PROCEDURE — 3700000000 HC ANESTHESIA ATTENDED CARE: Performed by: UROLOGY

## 2020-01-01 PROCEDURE — 6360000002 HC RX W HCPCS: Performed by: NURSE ANESTHETIST, CERTIFIED REGISTERED

## 2020-01-01 PROCEDURE — 80048 BASIC METABOLIC PNL TOTAL CA: CPT

## 2020-01-01 PROCEDURE — 97530 THERAPEUTIC ACTIVITIES: CPT

## 2020-01-01 PROCEDURE — G0378 HOSPITAL OBSERVATION PER HR: HCPCS

## 2020-01-01 PROCEDURE — 3209999900 FLUORO FOR SURGICAL PROCEDURES

## 2020-01-01 PROCEDURE — 71046 X-RAY EXAM CHEST 2 VIEWS: CPT

## 2020-01-01 PROCEDURE — 2500000003 HC RX 250 WO HCPCS: Performed by: NURSE PRACTITIONER

## 2020-01-01 PROCEDURE — 6370000000 HC RX 637 (ALT 250 FOR IP): Performed by: FAMILY MEDICINE

## 2020-01-01 PROCEDURE — 88112 CYTOPATH CELL ENHANCE TECH: CPT

## 2020-01-01 PROCEDURE — 6360000002 HC RX W HCPCS

## 2020-01-01 PROCEDURE — 83735 ASSAY OF MAGNESIUM: CPT

## 2020-01-01 PROCEDURE — 99232 SBSQ HOSP IP/OBS MODERATE 35: CPT | Performed by: CLINICAL NURSE SPECIALIST

## 2020-01-01 PROCEDURE — 1036F TOBACCO NON-USER: CPT | Performed by: INTERNAL MEDICINE

## 2020-01-01 PROCEDURE — C9113 INJ PANTOPRAZOLE SODIUM, VIA: HCPCS | Performed by: NURSE PRACTITIONER

## 2020-01-01 PROCEDURE — 3600000003 HC SURGERY LEVEL 3 BASE: Performed by: UROLOGY

## 2020-01-01 PROCEDURE — 6360000002 HC RX W HCPCS: Performed by: INTERNAL MEDICINE

## 2020-01-01 PROCEDURE — 99222 1ST HOSP IP/OBS MODERATE 55: CPT | Performed by: INTERNAL MEDICINE

## 2020-01-01 PROCEDURE — 82533 TOTAL CORTISOL: CPT

## 2020-01-01 PROCEDURE — G8484 FLU IMMUNIZE NO ADMIN: HCPCS | Performed by: INTERNAL MEDICINE

## 2020-01-01 PROCEDURE — 2000000000 HC ICU R&B

## 2020-01-01 PROCEDURE — 80053 COMPREHEN METABOLIC PANEL: CPT

## 2020-01-01 PROCEDURE — 85027 COMPLETE CBC AUTOMATED: CPT

## 2020-01-01 PROCEDURE — 36556 INSERT NON-TUNNEL CV CATH: CPT

## 2020-01-01 PROCEDURE — 6370000000 HC RX 637 (ALT 250 FOR IP): Performed by: UROLOGY

## 2020-01-01 PROCEDURE — 3700000001 HC ADD 15 MINUTES (ANESTHESIA): Performed by: UROLOGY

## 2020-01-01 PROCEDURE — 93000 ELECTROCARDIOGRAM COMPLETE: CPT | Performed by: INTERNAL MEDICINE

## 2020-01-01 PROCEDURE — 99233 SBSQ HOSP IP/OBS HIGH 50: CPT | Performed by: INTERNAL MEDICINE

## 2020-01-01 PROCEDURE — 93005 ELECTROCARDIOGRAM TRACING: CPT | Performed by: NURSE PRACTITIONER

## 2020-01-01 PROCEDURE — 99214 OFFICE O/P EST MOD 30 MIN: CPT | Performed by: INTERNAL MEDICINE

## 2020-01-01 PROCEDURE — G8420 CALC BMI NORM PARAMETERS: HCPCS | Performed by: INTERNAL MEDICINE

## 2020-01-01 PROCEDURE — 94003 VENT MGMT INPAT SUBQ DAY: CPT

## 2020-01-01 PROCEDURE — 83036 HEMOGLOBIN GLYCOSYLATED A1C: CPT

## 2020-01-01 PROCEDURE — 85730 THROMBOPLASTIN TIME PARTIAL: CPT

## 2020-01-01 PROCEDURE — 5A1955Z RESPIRATORY VENTILATION, GREATER THAN 96 CONSECUTIVE HOURS: ICD-10-PCS | Performed by: FAMILY MEDICINE

## 2020-01-01 PROCEDURE — 99222 1ST HOSP IP/OBS MODERATE 55: CPT | Performed by: PHYSICIAN ASSISTANT

## 2020-01-01 PROCEDURE — 93010 ELECTROCARDIOGRAM REPORT: CPT | Performed by: INTERNAL MEDICINE

## 2020-01-01 PROCEDURE — 97161 PT EVAL LOW COMPLEX 20 MIN: CPT

## 2020-01-01 PROCEDURE — 84145 PROCALCITONIN (PCT): CPT

## 2020-01-01 PROCEDURE — 80162 ASSAY OF DIGOXIN TOTAL: CPT

## 2020-01-01 PROCEDURE — 86901 BLOOD TYPING SEROLOGIC RH(D): CPT

## 2020-01-01 PROCEDURE — 99223 1ST HOSP IP/OBS HIGH 75: CPT | Performed by: INTERNAL MEDICINE

## 2020-01-01 PROCEDURE — 99232 SBSQ HOSP IP/OBS MODERATE 35: CPT | Performed by: NURSE PRACTITIONER

## 2020-01-01 PROCEDURE — 6360000002 HC RX W HCPCS: Performed by: EMERGENCY MEDICINE

## 2020-01-01 PROCEDURE — 96365 THER/PROPH/DIAG IV INF INIT: CPT

## 2020-01-01 PROCEDURE — 97110 THERAPEUTIC EXERCISES: CPT

## 2020-01-01 PROCEDURE — 84100 ASSAY OF PHOSPHORUS: CPT

## 2020-01-01 PROCEDURE — 99285 EMERGENCY DEPT VISIT HI MDM: CPT

## 2020-01-01 PROCEDURE — 1111F DSCHRG MED/CURRENT MED MERGE: CPT | Performed by: INTERNAL MEDICINE

## 2020-01-01 PROCEDURE — 84153 ASSAY OF PSA TOTAL: CPT

## 2020-01-01 PROCEDURE — 2500000003 HC RX 250 WO HCPCS

## 2020-01-01 PROCEDURE — 2500000003 HC RX 250 WO HCPCS: Performed by: EMERGENCY MEDICINE

## 2020-01-01 PROCEDURE — 93005 ELECTROCARDIOGRAM TRACING: CPT | Performed by: EMERGENCY MEDICINE

## 2020-01-01 PROCEDURE — 02HV33Z INSERTION OF INFUSION DEVICE INTO SUPERIOR VENA CAVA, PERCUTANEOUS APPROACH: ICD-10-PCS | Performed by: SURGERY

## 2020-01-01 PROCEDURE — 99222 1ST HOSP IP/OBS MODERATE 55: CPT | Performed by: PSYCHIATRY & NEUROLOGY

## 2020-01-01 PROCEDURE — 80061 LIPID PANEL: CPT

## 2020-01-01 PROCEDURE — 2700000000 HC OXYGEN THERAPY PER DAY

## 2020-01-01 PROCEDURE — 51798 US URINE CAPACITY MEASURE: CPT

## 2020-01-01 PROCEDURE — 93016 CV STRESS TEST SUPVJ ONLY: CPT | Performed by: INTERNAL MEDICINE

## 2020-01-01 PROCEDURE — 96366 THER/PROPH/DIAG IV INF ADDON: CPT

## 2020-01-01 PROCEDURE — 96374 THER/PROPH/DIAG INJ IV PUSH: CPT

## 2020-01-01 PROCEDURE — 83880 ASSAY OF NATRIURETIC PEPTIDE: CPT

## 2020-01-01 PROCEDURE — 93306 TTE W/DOPPLER COMPLETE: CPT

## 2020-01-01 PROCEDURE — 70450 CT HEAD/BRAIN W/O DYE: CPT

## 2020-01-01 PROCEDURE — 99233 SBSQ HOSP IP/OBS HIGH 50: CPT | Performed by: NURSE PRACTITIONER

## 2020-01-01 PROCEDURE — 86900 BLOOD TYPING SEROLOGIC ABO: CPT

## 2020-01-01 PROCEDURE — 6360000002 HC RX W HCPCS: Performed by: FAMILY MEDICINE

## 2020-01-01 PROCEDURE — 84484 ASSAY OF TROPONIN QUANT: CPT

## 2020-01-01 PROCEDURE — C1758 CATHETER, URETERAL: HCPCS | Performed by: UROLOGY

## 2020-01-01 PROCEDURE — 2580000003 HC RX 258: Performed by: EMERGENCY MEDICINE

## 2020-01-01 PROCEDURE — 4040F PNEUMOC VAC/ADMIN/RCVD: CPT | Performed by: INTERNAL MEDICINE

## 2020-01-01 PROCEDURE — 2709999900 HC NON-CHARGEABLE SUPPLY: Performed by: UROLOGY

## 2020-01-01 PROCEDURE — 76770 US EXAM ABDO BACK WALL COMP: CPT

## 2020-01-01 PROCEDURE — 70498 CT ANGIOGRAPHY NECK: CPT

## 2020-01-01 PROCEDURE — 95822 EEG COMA OR SLEEP ONLY: CPT | Performed by: PSYCHIATRY & NEUROLOGY

## 2020-01-01 PROCEDURE — 3430000000 HC RX DIAGNOSTIC RADIOPHARMACEUTICAL: Performed by: RADIOLOGY

## 2020-01-01 PROCEDURE — 99291 CRITICAL CARE FIRST HOUR: CPT | Performed by: PSYCHIATRY & NEUROLOGY

## 2020-01-01 PROCEDURE — 93018 CV STRESS TEST I&R ONLY: CPT | Performed by: INTERNAL MEDICINE

## 2020-01-01 PROCEDURE — 74018 RADEX ABDOMEN 1 VIEW: CPT

## 2020-01-01 PROCEDURE — 2580000003 HC RX 258: Performed by: UROLOGY

## 2020-01-01 PROCEDURE — 93017 CV STRESS TEST TRACING ONLY: CPT

## 2020-01-01 PROCEDURE — 85610 PROTHROMBIN TIME: CPT

## 2020-01-01 PROCEDURE — 96375 TX/PRO/DX INJ NEW DRUG ADDON: CPT

## 2020-01-01 PROCEDURE — 6360000004 HC RX CONTRAST MEDICATION: Performed by: RADIOLOGY

## 2020-01-01 PROCEDURE — 2580000003 HC RX 258

## 2020-01-01 PROCEDURE — 6360000002 HC RX W HCPCS: Performed by: UROLOGY

## 2020-01-01 PROCEDURE — 96367 TX/PROPH/DG ADDL SEQ IV INF: CPT

## 2020-01-01 PROCEDURE — 1123F ACP DISCUSS/DSCN MKR DOCD: CPT | Performed by: INTERNAL MEDICINE

## 2020-01-01 PROCEDURE — 83605 ASSAY OF LACTIC ACID: CPT

## 2020-01-01 PROCEDURE — 99232 SBSQ HOSP IP/OBS MODERATE 35: CPT | Performed by: SURGERY

## 2020-01-01 PROCEDURE — APPSS60 APP SPLIT SHARED TIME 46-60 MINUTES: Performed by: NURSE PRACTITIONER

## 2020-01-01 PROCEDURE — 70496 CT ANGIOGRAPHY HEAD: CPT

## 2020-01-01 PROCEDURE — 76775 US EXAM ABDO BACK WALL LIM: CPT

## 2020-01-01 PROCEDURE — 99283 EMERGENCY DEPT VISIT LOW MDM: CPT

## 2020-01-01 PROCEDURE — 7100000011 HC PHASE II RECOVERY - ADDTL 15 MIN: Performed by: UROLOGY

## 2020-01-01 PROCEDURE — 36556 INSERT NON-TUNNEL CV CATH: CPT | Performed by: SURGERY

## 2020-01-01 PROCEDURE — APPSS30 APP SPLIT SHARED TIME 16-30 MINUTES: Performed by: NURSE PRACTITIONER

## 2020-01-01 PROCEDURE — 51702 INSERT TEMP BLADDER CATH: CPT

## 2020-01-01 PROCEDURE — 72125 CT NECK SPINE W/O DYE: CPT

## 2020-01-01 PROCEDURE — 78452 HT MUSCLE IMAGE SPECT MULT: CPT

## 2020-01-01 PROCEDURE — 3600000013 HC SURGERY LEVEL 3 ADDTL 15MIN: Performed by: UROLOGY

## 2020-01-01 PROCEDURE — 99233 SBSQ HOSP IP/OBS HIGH 50: CPT | Performed by: CLINICAL NURSE SPECIALIST

## 2020-01-01 PROCEDURE — 0042T CT BRAIN PERFUSION: CPT

## 2020-01-01 PROCEDURE — 86850 RBC ANTIBODY SCREEN: CPT

## 2020-01-01 PROCEDURE — 0BH18EZ INSERTION OF ENDOTRACHEAL AIRWAY INTO TRACHEA, VIA NATURAL OR ARTIFICIAL OPENING ENDOSCOPIC: ICD-10-PCS | Performed by: FAMILY MEDICINE

## 2020-01-01 PROCEDURE — 7100000010 HC PHASE II RECOVERY - FIRST 15 MIN: Performed by: UROLOGY

## 2020-01-01 PROCEDURE — 87081 CULTURE SCREEN ONLY: CPT

## 2020-01-01 PROCEDURE — 70551 MRI BRAIN STEM W/O DYE: CPT

## 2020-01-01 PROCEDURE — 0100U HC RESPIRPTHGN MULT REV TRANS & AMP PRB TECH 21 TRGT: CPT

## 2020-01-01 PROCEDURE — 94002 VENT MGMT INPAT INIT DAY: CPT

## 2020-01-01 PROCEDURE — 36620 INSERTION CATHETER ARTERY: CPT | Performed by: SURGERY

## 2020-01-01 PROCEDURE — 88305 TISSUE EXAM BY PATHOLOGIST: CPT

## 2020-01-01 PROCEDURE — A9500 TC99M SESTAMIBI: HCPCS | Performed by: RADIOLOGY

## 2020-01-01 RX ORDER — SODIUM CHLORIDE 0.9 % (FLUSH) 0.9 %
10 SYRINGE (ML) INJECTION EVERY 12 HOURS SCHEDULED
Status: DISCONTINUED | OUTPATIENT
Start: 2020-01-01 | End: 2020-01-01 | Stop reason: HOSPADM

## 2020-01-01 RX ORDER — LABETALOL HYDROCHLORIDE 5 MG/ML
10 INJECTION, SOLUTION INTRAVENOUS EVERY 10 MIN PRN
Status: DISCONTINUED | OUTPATIENT
Start: 2020-01-01 | End: 2020-01-01

## 2020-01-01 RX ORDER — 0.9 % SODIUM CHLORIDE 0.9 %
1000 INTRAVENOUS SOLUTION INTRAVENOUS ONCE
Status: COMPLETED | OUTPATIENT
Start: 2020-01-01 | End: 2020-01-01

## 2020-01-01 RX ORDER — DIGOXIN 125 MCG
125 TABLET ORAL DAILY
Qty: 30 TABLET | Refills: 2 | Status: SHIPPED | OUTPATIENT
Start: 2020-01-01 | End: 2020-01-01 | Stop reason: SDUPTHER

## 2020-01-01 RX ORDER — SODIUM CHLORIDE 9 MG/ML
INJECTION, SOLUTION INTRAVENOUS CONTINUOUS
Status: DISCONTINUED | OUTPATIENT
Start: 2020-01-01 | End: 2020-01-01

## 2020-01-01 RX ORDER — DIGOXIN 125 MCG
125 TABLET ORAL DAILY
Status: DISCONTINUED | OUTPATIENT
Start: 2020-01-01 | End: 2020-01-01

## 2020-01-01 RX ORDER — FUROSEMIDE 10 MG/ML
20 INJECTION INTRAMUSCULAR; INTRAVENOUS ONCE
Status: COMPLETED | OUTPATIENT
Start: 2020-01-01 | End: 2020-01-01

## 2020-01-01 RX ORDER — HEPARIN SODIUM 10000 [USP'U]/100ML
12 INJECTION, SOLUTION INTRAVENOUS CONTINUOUS
Status: DISCONTINUED | OUTPATIENT
Start: 2020-01-01 | End: 2020-01-01

## 2020-01-01 RX ORDER — SPIRONOLACTONE 25 MG/1
12.5 TABLET ORAL DAILY
Status: DISCONTINUED | OUTPATIENT
Start: 2020-01-01 | End: 2020-01-01 | Stop reason: HOSPADM

## 2020-01-01 RX ORDER — 0.9 % SODIUM CHLORIDE 0.9 %
500 INTRAVENOUS SOLUTION INTRAVENOUS ONCE
Status: COMPLETED | OUTPATIENT
Start: 2020-01-01 | End: 2020-01-01

## 2020-01-01 RX ORDER — SODIUM CHLORIDE 0.9 % (FLUSH) 0.9 %
SYRINGE (ML) INJECTION
Status: COMPLETED
Start: 2020-01-01 | End: 2020-01-01

## 2020-01-01 RX ORDER — ASPIRIN 81 MG/1
81 TABLET ORAL DAILY
Status: DISCONTINUED | OUTPATIENT
Start: 2020-01-01 | End: 2020-01-01

## 2020-01-01 RX ORDER — ATORVASTATIN CALCIUM 40 MG/1
40 TABLET, FILM COATED ORAL DAILY
COMMUNITY

## 2020-01-01 RX ORDER — METOPROLOL SUCCINATE 25 MG/1
12.5 TABLET, EXTENDED RELEASE ORAL 2 TIMES DAILY
Status: DISCONTINUED | OUTPATIENT
Start: 2020-01-01 | End: 2020-01-01 | Stop reason: HOSPADM

## 2020-01-01 RX ORDER — TAMSULOSIN HYDROCHLORIDE 0.4 MG/1
0.4 CAPSULE ORAL DAILY
Qty: 30 CAPSULE | Refills: 0 | Status: ON HOLD | OUTPATIENT
Start: 2020-01-01 | End: 2020-01-01 | Stop reason: HOSPADM

## 2020-01-01 RX ORDER — FENTANYL CITRATE 50 UG/ML
25 INJECTION, SOLUTION INTRAMUSCULAR; INTRAVENOUS
Status: DISCONTINUED | OUTPATIENT
Start: 2020-01-01 | End: 2020-01-01

## 2020-01-01 RX ORDER — ATORVASTATIN CALCIUM 40 MG/1
40 TABLET, FILM COATED ORAL NIGHTLY
Qty: 30 TABLET | Refills: 3 | Status: SHIPPED | OUTPATIENT
Start: 2020-01-01 | End: 2020-01-01 | Stop reason: CLARIF

## 2020-01-01 RX ORDER — FUROSEMIDE 10 MG/ML
40 INJECTION INTRAMUSCULAR; INTRAVENOUS DAILY
Status: DISCONTINUED | OUTPATIENT
Start: 2020-01-01 | End: 2020-01-01

## 2020-01-01 RX ORDER — ONDANSETRON 4 MG/1
4 TABLET, ORALLY DISINTEGRATING ORAL EVERY 8 HOURS PRN
Qty: 10 TABLET | Refills: 0 | Status: SHIPPED | OUTPATIENT
Start: 2020-01-01 | End: 2021-02-28

## 2020-01-01 RX ORDER — MAGNESIUM SULFATE IN WATER 40 MG/ML
2 INJECTION, SOLUTION INTRAVENOUS ONCE
Status: COMPLETED | OUTPATIENT
Start: 2020-01-01 | End: 2020-01-01

## 2020-01-01 RX ORDER — DEXTROSE MONOHYDRATE 25 G/50ML
12.5 INJECTION, SOLUTION INTRAVENOUS PRN
Status: DISCONTINUED | OUTPATIENT
Start: 2020-01-01 | End: 2020-01-01

## 2020-01-01 RX ORDER — METOPROLOL TARTRATE 5 MG/5ML
2.5 INJECTION INTRAVENOUS EVERY 6 HOURS
Status: DISCONTINUED | OUTPATIENT
Start: 2020-01-01 | End: 2020-01-01

## 2020-01-01 RX ORDER — ATORVASTATIN CALCIUM 40 MG/1
40 TABLET, FILM COATED ORAL DAILY
Status: DISCONTINUED | OUTPATIENT
Start: 2020-01-01 | End: 2020-01-01 | Stop reason: HOSPADM

## 2020-01-01 RX ORDER — IBUPROFEN 400 MG/1
800 TABLET ORAL EVERY 6 HOURS PRN
Status: DISCONTINUED | OUTPATIENT
Start: 2020-01-01 | End: 2020-01-01 | Stop reason: HOSPADM

## 2020-01-01 RX ORDER — MIDAZOLAM HYDROCHLORIDE 1 MG/ML
2 INJECTION INTRAMUSCULAR; INTRAVENOUS ONCE
Status: COMPLETED | OUTPATIENT
Start: 2020-01-01 | End: 2020-01-01

## 2020-01-01 RX ORDER — CEFAZOLIN SODIUM 2 G/50ML
2 SOLUTION INTRAVENOUS
Status: COMPLETED | OUTPATIENT
Start: 2020-01-01 | End: 2020-01-01

## 2020-01-01 RX ORDER — NICOTINE POLACRILEX 4 MG
15 LOZENGE BUCCAL PRN
Status: DISCONTINUED | OUTPATIENT
Start: 2020-01-01 | End: 2020-01-01 | Stop reason: HOSPADM

## 2020-01-01 RX ORDER — SODIUM CHLORIDE 9 MG/ML
INJECTION, SOLUTION INTRAVENOUS CONTINUOUS
Status: DISCONTINUED | OUTPATIENT
Start: 2020-01-01 | End: 2020-01-01 | Stop reason: HOSPADM

## 2020-01-01 RX ORDER — DIGOXIN 125 MCG
125 TABLET ORAL DAILY
Status: DISCONTINUED | OUTPATIENT
Start: 2020-01-01 | End: 2020-01-01 | Stop reason: HOSPADM

## 2020-01-01 RX ORDER — DIGOXIN 0.25 MG/ML
500 INJECTION INTRAMUSCULAR; INTRAVENOUS ONCE
Status: COMPLETED | OUTPATIENT
Start: 2020-01-01 | End: 2020-01-01

## 2020-01-01 RX ORDER — ACETAMINOPHEN 325 MG/1
650 TABLET ORAL EVERY 4 HOURS PRN
Status: DISCONTINUED | OUTPATIENT
Start: 2020-01-01 | End: 2020-01-01 | Stop reason: HOSPADM

## 2020-01-01 RX ORDER — PHENYLEPHRINE HYDROCHLORIDE 10 MG/ML
INJECTION INTRAVENOUS PRN
Status: DISCONTINUED | OUTPATIENT
Start: 2020-01-01 | End: 2020-01-01 | Stop reason: SDUPTHER

## 2020-01-01 RX ORDER — INSULIN GLARGINE 100 [IU]/ML
10 INJECTION, SOLUTION SUBCUTANEOUS NIGHTLY
Status: DISCONTINUED | OUTPATIENT
Start: 2020-01-01 | End: 2020-01-01

## 2020-01-01 RX ORDER — SODIUM CHLORIDE 0.9 % (FLUSH) 0.9 %
10 SYRINGE (ML) INJECTION PRN
Status: DISCONTINUED | OUTPATIENT
Start: 2020-01-01 | End: 2020-01-01 | Stop reason: HOSPADM

## 2020-01-01 RX ORDER — INSULIN GLARGINE 100 [IU]/ML
10 INJECTION, SOLUTION SUBCUTANEOUS NIGHTLY
Status: DISCONTINUED | OUTPATIENT
Start: 2020-01-01 | End: 2020-01-01 | Stop reason: HOSPADM

## 2020-01-01 RX ORDER — DIGOXIN 0.25 MG/ML
250 INJECTION INTRAMUSCULAR; INTRAVENOUS ONCE
Status: DISCONTINUED | OUTPATIENT
Start: 2020-01-01 | End: 2020-01-01 | Stop reason: HOSPADM

## 2020-01-01 RX ORDER — MORPHINE SULFATE 4 MG/ML
4 INJECTION, SOLUTION INTRAMUSCULAR; INTRAVENOUS
Status: DISCONTINUED | OUTPATIENT
Start: 2020-01-01 | End: 2020-01-01 | Stop reason: HOSPADM

## 2020-01-01 RX ORDER — 0.9 % SODIUM CHLORIDE 0.9 %
250 INTRAVENOUS SOLUTION INTRAVENOUS ONCE
Status: DISCONTINUED | OUTPATIENT
Start: 2020-01-01 | End: 2020-01-01 | Stop reason: HOSPADM

## 2020-01-01 RX ORDER — IBUPROFEN 800 MG/1
800 TABLET ORAL EVERY 8 HOURS PRN
Qty: 30 TABLET | Refills: 0 | Status: SHIPPED | OUTPATIENT
Start: 2020-01-01

## 2020-01-01 RX ORDER — ATORVASTATIN CALCIUM 80 MG/1
80 TABLET, FILM COATED ORAL NIGHTLY
Status: DISCONTINUED | OUTPATIENT
Start: 2020-01-01 | End: 2020-01-01

## 2020-01-01 RX ORDER — INSULIN GLARGINE 100 [IU]/ML
12 INJECTION, SOLUTION SUBCUTANEOUS NIGHTLY
Status: DISCONTINUED | OUTPATIENT
Start: 2020-01-01 | End: 2020-01-01 | Stop reason: HOSPADM

## 2020-01-01 RX ORDER — ONDANSETRON 2 MG/ML
4 INJECTION INTRAMUSCULAR; INTRAVENOUS EVERY 6 HOURS PRN
Status: DISCONTINUED | OUTPATIENT
Start: 2020-01-01 | End: 2020-01-01 | Stop reason: HOSPADM

## 2020-01-01 RX ORDER — MORPHINE SULFATE 2 MG/ML
2 INJECTION, SOLUTION INTRAMUSCULAR; INTRAVENOUS
Status: DISCONTINUED | OUTPATIENT
Start: 2020-01-01 | End: 2020-01-01 | Stop reason: HOSPADM

## 2020-01-01 RX ORDER — FUROSEMIDE 10 MG/ML
40 INJECTION INTRAMUSCULAR; INTRAVENOUS 2 TIMES DAILY
Status: DISCONTINUED | OUTPATIENT
Start: 2020-01-01 | End: 2020-01-01

## 2020-01-01 RX ORDER — NICOTINE POLACRILEX 4 MG
15 LOZENGE BUCCAL PRN
Status: DISCONTINUED | OUTPATIENT
Start: 2020-01-01 | End: 2020-01-01

## 2020-01-01 RX ORDER — DEXTROSE MONOHYDRATE 50 MG/ML
100 INJECTION, SOLUTION INTRAVENOUS PRN
Status: DISCONTINUED | OUTPATIENT
Start: 2020-01-01 | End: 2020-01-01

## 2020-01-01 RX ORDER — SODIUM CHLORIDE 9 MG/ML
10 INJECTION INTRAVENOUS DAILY
Status: DISCONTINUED | OUTPATIENT
Start: 2020-01-01 | End: 2020-01-01

## 2020-01-01 RX ORDER — DEXTROSE MONOHYDRATE 50 MG/ML
100 INJECTION, SOLUTION INTRAVENOUS PRN
Status: DISCONTINUED | OUTPATIENT
Start: 2020-01-01 | End: 2020-01-01 | Stop reason: HOSPADM

## 2020-01-01 RX ORDER — FENTANYL CITRATE 50 UG/ML
INJECTION, SOLUTION INTRAMUSCULAR; INTRAVENOUS PRN
Status: DISCONTINUED | OUTPATIENT
Start: 2020-01-01 | End: 2020-01-01 | Stop reason: SDUPTHER

## 2020-01-01 RX ORDER — LEVETIRACETAM 100 MG/ML
500 SOLUTION ORAL 2 TIMES DAILY
Status: DISCONTINUED | OUTPATIENT
Start: 2020-01-01 | End: 2020-01-01

## 2020-01-01 RX ORDER — PROMETHAZINE HYDROCHLORIDE 25 MG/1
12.5 TABLET ORAL EVERY 6 HOURS PRN
Status: DISCONTINUED | OUTPATIENT
Start: 2020-01-01 | End: 2020-01-01

## 2020-01-01 RX ORDER — ATORVASTATIN CALCIUM 40 MG/1
40 TABLET, FILM COATED ORAL NIGHTLY
Status: DISCONTINUED | OUTPATIENT
Start: 2020-01-01 | End: 2020-01-01 | Stop reason: HOSPADM

## 2020-01-01 RX ORDER — ATROPA BELLADONNA AND OPIUM 16.2; 6 MG/1; MG/1
60 SUPPOSITORY RECTAL EVERY 8 HOURS PRN
Status: DISCONTINUED | OUTPATIENT
Start: 2020-01-01 | End: 2020-01-01

## 2020-01-01 RX ORDER — ATORVASTATIN CALCIUM 40 MG/1
40 TABLET, FILM COATED ORAL NIGHTLY
Status: DISCONTINUED | OUTPATIENT
Start: 2020-01-01 | End: 2020-01-01

## 2020-01-01 RX ORDER — GLYCOPYRROLATE 0.2 MG/ML
0.2 INJECTION INTRAMUSCULAR; INTRAVENOUS EVERY 4 HOURS PRN
Status: DISCONTINUED | OUTPATIENT
Start: 2020-01-01 | End: 2020-01-01 | Stop reason: HOSPADM

## 2020-01-01 RX ORDER — CARVEDILOL 3.12 MG/1
3.12 TABLET ORAL 2 TIMES DAILY WITH MEALS
Status: DISCONTINUED | OUTPATIENT
Start: 2020-01-01 | End: 2020-01-01

## 2020-01-01 RX ORDER — BISACODYL 10 MG
10 SUPPOSITORY, RECTAL RECTAL ONCE
Status: COMPLETED | OUTPATIENT
Start: 2020-01-01 | End: 2020-01-01

## 2020-01-01 RX ORDER — PROPOFOL 10 MG/ML
10 INJECTION, EMULSION INTRAVENOUS
Status: DISCONTINUED | OUTPATIENT
Start: 2020-01-01 | End: 2020-01-01 | Stop reason: ALTCHOICE

## 2020-01-01 RX ORDER — FUROSEMIDE 40 MG/1
40 TABLET ORAL DAILY
Status: DISCONTINUED | OUTPATIENT
Start: 2020-01-01 | End: 2020-01-01 | Stop reason: HOSPADM

## 2020-01-01 RX ORDER — FUROSEMIDE 40 MG/1
40 TABLET ORAL DAILY
Status: ON HOLD | COMMUNITY
End: 2020-01-01 | Stop reason: SDUPTHER

## 2020-01-01 RX ORDER — SODIUM CHLORIDE 9 MG/ML
INJECTION, SOLUTION INTRAVENOUS CONTINUOUS
Status: ACTIVE | OUTPATIENT
Start: 2020-01-01 | End: 2020-01-01

## 2020-01-01 RX ORDER — ALPHA LIPOIC ACID 300 MG
1 CAPSULE ORAL DAILY
COMMUNITY
End: 2020-01-01 | Stop reason: CLARIF

## 2020-01-01 RX ORDER — ISOSORBIDE MONONITRATE 30 MG/1
30 TABLET, EXTENDED RELEASE ORAL DAILY
Qty: 30 TABLET | Refills: 3 | Status: SHIPPED | OUTPATIENT
Start: 2020-01-01

## 2020-01-01 RX ORDER — CHLORHEXIDINE GLUCONATE 0.12 MG/ML
15 RINSE ORAL 2 TIMES DAILY
Status: DISCONTINUED | OUTPATIENT
Start: 2020-01-01 | End: 2020-01-01

## 2020-01-01 RX ORDER — DIGOXIN 0.25 MG/ML
250 INJECTION INTRAMUSCULAR; INTRAVENOUS ONCE
Status: COMPLETED | OUTPATIENT
Start: 2020-01-01 | End: 2020-01-01

## 2020-01-01 RX ORDER — DOCUSATE SODIUM 100 MG/1
100 CAPSULE, LIQUID FILLED ORAL DAILY
Status: DISCONTINUED | OUTPATIENT
Start: 2020-01-01 | End: 2020-01-01 | Stop reason: HOSPADM

## 2020-01-01 RX ORDER — DIGOXIN 0.25 MG/ML
125 INJECTION INTRAMUSCULAR; INTRAVENOUS DAILY
Status: DISCONTINUED | OUTPATIENT
Start: 2020-01-01 | End: 2020-01-01

## 2020-01-01 RX ORDER — HEPARIN SODIUM 1000 [USP'U]/ML
60 INJECTION, SOLUTION INTRAVENOUS; SUBCUTANEOUS PRN
Status: DISCONTINUED | OUTPATIENT
Start: 2020-01-01 | End: 2020-01-01

## 2020-01-01 RX ORDER — FUROSEMIDE 10 MG/ML
20 INJECTION INTRAMUSCULAR; INTRAVENOUS 2 TIMES DAILY
Status: COMPLETED | OUTPATIENT
Start: 2020-01-01 | End: 2020-01-01

## 2020-01-01 RX ORDER — ALPHA LIPOIC ACID 300 MG
1 CAPSULE ORAL DAILY
Status: DISCONTINUED | OUTPATIENT
Start: 2020-01-01 | End: 2020-01-01 | Stop reason: CLARIF

## 2020-01-01 RX ORDER — ATROPA BELLADONNA AND OPIUM 16.2; 6 MG/1; MG/1
60 SUPPOSITORY RECTAL EVERY 8 HOURS PRN
Status: DISCONTINUED | OUTPATIENT
Start: 2020-01-01 | End: 2020-01-01 | Stop reason: HOSPADM

## 2020-01-01 RX ORDER — FUROSEMIDE 10 MG/ML
40 INJECTION INTRAMUSCULAR; INTRAVENOUS DAILY
Status: DISCONTINUED | OUTPATIENT
Start: 2020-01-01 | End: 2020-01-01 | Stop reason: HOSPADM

## 2020-01-01 RX ORDER — ACETAMINOPHEN 650 MG/1
650 SUPPOSITORY RECTAL EVERY 4 HOURS PRN
Status: DISCONTINUED | OUTPATIENT
Start: 2020-01-01 | End: 2020-01-01 | Stop reason: HOSPADM

## 2020-01-01 RX ORDER — PANTOPRAZOLE SODIUM 40 MG/10ML
40 INJECTION, POWDER, LYOPHILIZED, FOR SOLUTION INTRAVENOUS DAILY
Status: DISCONTINUED | OUTPATIENT
Start: 2020-01-01 | End: 2020-01-01

## 2020-01-01 RX ORDER — HEPARIN SODIUM 1000 [USP'U]/ML
30 INJECTION, SOLUTION INTRAVENOUS; SUBCUTANEOUS PRN
Status: DISCONTINUED | OUTPATIENT
Start: 2020-01-01 | End: 2020-01-01

## 2020-01-01 RX ORDER — MAGNESIUM SULFATE IN WATER 40 MG/ML
4 INJECTION, SOLUTION INTRAVENOUS ONCE
Status: COMPLETED | OUTPATIENT
Start: 2020-01-01 | End: 2020-01-01

## 2020-01-01 RX ORDER — ONDANSETRON 2 MG/ML
4 INJECTION INTRAMUSCULAR; INTRAVENOUS EVERY 6 HOURS PRN
Status: DISCONTINUED | OUTPATIENT
Start: 2020-01-01 | End: 2020-01-01

## 2020-01-01 RX ORDER — PHENAZOPYRIDINE HYDROCHLORIDE 100 MG/1
100 TABLET, FILM COATED ORAL 3 TIMES DAILY PRN
Qty: 30 TABLET | Refills: 1 | Status: SHIPPED | OUTPATIENT
Start: 2020-01-01 | End: 2021-02-26

## 2020-01-01 RX ORDER — AMOXICILLIN AND CLAVULANATE POTASSIUM 875; 125 MG/1; MG/1
1 TABLET, FILM COATED ORAL 2 TIMES DAILY
Status: COMPLETED | OUTPATIENT
Start: 2020-01-01 | End: 2020-01-01

## 2020-01-01 RX ORDER — OXYCODONE HYDROCHLORIDE AND ACETAMINOPHEN 5; 325 MG/1; MG/1
2 TABLET ORAL EVERY 4 HOURS PRN
Status: DISCONTINUED | OUTPATIENT
Start: 2020-01-01 | End: 2020-01-01 | Stop reason: HOSPADM

## 2020-01-01 RX ORDER — DIGOXIN 125 MCG
125 TABLET ORAL DAILY
Qty: 90 TABLET | Refills: 3 | Status: SHIPPED | OUTPATIENT
Start: 2020-01-01

## 2020-01-01 RX ORDER — MIDAZOLAM HYDROCHLORIDE 1 MG/ML
1 INJECTION INTRAMUSCULAR; INTRAVENOUS
Status: DISCONTINUED | OUTPATIENT
Start: 2020-01-01 | End: 2020-01-01 | Stop reason: HOSPADM

## 2020-01-01 RX ORDER — OXYCODONE HYDROCHLORIDE AND ACETAMINOPHEN 5; 325 MG/1; MG/1
1 TABLET ORAL EVERY 4 HOURS PRN
Status: DISCONTINUED | OUTPATIENT
Start: 2020-01-01 | End: 2020-01-01 | Stop reason: HOSPADM

## 2020-01-01 RX ORDER — POLYETHYLENE GLYCOL 3350 17 G/17G
17 POWDER, FOR SOLUTION ORAL DAILY
Status: DISCONTINUED | OUTPATIENT
Start: 2020-01-01 | End: 2020-01-01

## 2020-01-01 RX ORDER — HEPARIN SODIUM 1000 [USP'U]/ML
60 INJECTION, SOLUTION INTRAVENOUS; SUBCUTANEOUS ONCE
Status: COMPLETED | OUTPATIENT
Start: 2020-01-01 | End: 2020-01-01

## 2020-01-01 RX ORDER — DEXTROSE MONOHYDRATE 25 G/50ML
12.5 INJECTION, SOLUTION INTRAVENOUS PRN
Status: DISCONTINUED | OUTPATIENT
Start: 2020-01-01 | End: 2020-01-01 | Stop reason: HOSPADM

## 2020-01-01 RX ORDER — LORAZEPAM 2 MG/ML
0.5 INJECTION INTRAMUSCULAR
Status: DISCONTINUED | OUTPATIENT
Start: 2020-01-01 | End: 2020-01-01 | Stop reason: HOSPADM

## 2020-01-01 RX ORDER — TAMSULOSIN HYDROCHLORIDE 0.4 MG/1
0.4 CAPSULE ORAL DAILY
Status: DISCONTINUED | OUTPATIENT
Start: 2020-01-01 | End: 2020-01-01 | Stop reason: HOSPADM

## 2020-01-01 RX ORDER — PROPOFOL 10 MG/ML
INJECTION, EMULSION INTRAVENOUS CONTINUOUS PRN
Status: DISCONTINUED | OUTPATIENT
Start: 2020-01-01 | End: 2020-01-01 | Stop reason: SDUPTHER

## 2020-01-01 RX ORDER — VECURONIUM BROMIDE 1 MG/ML
5 INJECTION, POWDER, LYOPHILIZED, FOR SOLUTION INTRAVENOUS ONCE
Status: COMPLETED | OUTPATIENT
Start: 2020-01-01 | End: 2020-01-01

## 2020-01-01 RX ORDER — 0.9 % SODIUM CHLORIDE 0.9 %
250 INTRAVENOUS SOLUTION INTRAVENOUS ONCE
Status: COMPLETED | OUTPATIENT
Start: 2020-01-01 | End: 2020-01-01

## 2020-01-01 RX ORDER — FUROSEMIDE 40 MG/1
20 TABLET ORAL DAILY
Qty: 60 TABLET | Refills: 3 | Status: SHIPPED | OUTPATIENT
Start: 2020-01-01

## 2020-01-01 RX ORDER — ACETAMINOPHEN 325 MG/1
650 TABLET ORAL EVERY 4 HOURS PRN
Status: DISCONTINUED | OUTPATIENT
Start: 2020-01-01 | End: 2020-01-01

## 2020-01-01 RX ORDER — CIPROFLOXACIN 250 MG/1
250 TABLET, FILM COATED ORAL 2 TIMES DAILY
Qty: 10 TABLET | Refills: 0 | Status: SHIPPED | OUTPATIENT
Start: 2020-01-01 | End: 2020-01-01

## 2020-01-01 RX ORDER — LEVETIRACETAM 10 MG/ML
1000 INJECTION INTRAVASCULAR ONCE
Status: COMPLETED | OUTPATIENT
Start: 2020-01-01 | End: 2020-01-01

## 2020-01-01 RX ORDER — ISOSORBIDE MONONITRATE 30 MG/1
30 TABLET, EXTENDED RELEASE ORAL DAILY
Status: DISCONTINUED | OUTPATIENT
Start: 2020-01-01 | End: 2020-01-01 | Stop reason: HOSPADM

## 2020-01-01 RX ORDER — AMOXICILLIN AND CLAVULANATE POTASSIUM 875; 125 MG/1; MG/1
1 TABLET, FILM COATED ORAL 2 TIMES DAILY
Status: ON HOLD | COMMUNITY
Start: 2020-01-01 | End: 2020-01-01 | Stop reason: HOSPADM

## 2020-01-01 RX ORDER — DIGOXIN 125 MCG
125 TABLET ORAL DAILY
COMMUNITY
End: 2020-01-01 | Stop reason: SDUPTHER

## 2020-01-01 RX ORDER — METOPROLOL SUCCINATE 25 MG/1
12.5 TABLET, EXTENDED RELEASE ORAL DAILY
Status: DISCONTINUED | OUTPATIENT
Start: 2020-01-01 | End: 2020-01-01 | Stop reason: HOSPADM

## 2020-01-01 RX ORDER — LEVETIRACETAM 5 MG/ML
1000 INJECTION INTRAVASCULAR 2 TIMES DAILY
Status: DISCONTINUED | OUTPATIENT
Start: 2020-01-01 | End: 2020-01-01

## 2020-01-01 RX ORDER — 0.9 % SODIUM CHLORIDE 0.9 %
1000 INTRAVENOUS SOLUTION INTRAVENOUS ONCE
Status: DISCONTINUED | OUTPATIENT
Start: 2020-01-01 | End: 2020-01-01

## 2020-01-01 RX ORDER — PROPOFOL 10 MG/ML
10 INJECTION, EMULSION INTRAVENOUS ONCE
Status: COMPLETED | OUTPATIENT
Start: 2020-01-01 | End: 2020-01-01

## 2020-01-01 RX ORDER — PROPOFOL 10 MG/ML
INJECTION, EMULSION INTRAVENOUS
Status: COMPLETED
Start: 2020-01-01 | End: 2020-01-01

## 2020-01-01 RX ORDER — METOPROLOL SUCCINATE 25 MG/1
12.5 TABLET, EXTENDED RELEASE ORAL 2 TIMES DAILY
Qty: 30 TABLET | Refills: 3 | Status: SHIPPED | OUTPATIENT
Start: 2020-01-01

## 2020-01-01 RX ORDER — INSULIN GLARGINE 100 [IU]/ML
15 INJECTION, SOLUTION SUBCUTANEOUS NIGHTLY
Status: DISCONTINUED | OUTPATIENT
Start: 2020-01-01 | End: 2020-01-01

## 2020-01-01 RX ORDER — SODIUM CHLORIDE 0.9 % (FLUSH) 0.9 %
10 SYRINGE (ML) INJECTION EVERY 12 HOURS SCHEDULED
Status: DISCONTINUED | OUTPATIENT
Start: 2020-01-01 | End: 2020-01-01

## 2020-01-01 RX ORDER — MIDAZOLAM HYDROCHLORIDE 1 MG/ML
0.5 INJECTION INTRAMUSCULAR; INTRAVENOUS
Status: DISCONTINUED | OUTPATIENT
Start: 2020-01-01 | End: 2020-01-01 | Stop reason: HOSPADM

## 2020-01-01 RX ORDER — POLYVINYL ALCOHOL 14 MG/ML
1 SOLUTION/ DROPS OPHTHALMIC EVERY 4 HOURS PRN
Status: DISCONTINUED | OUTPATIENT
Start: 2020-01-01 | End: 2020-01-01

## 2020-01-01 RX ORDER — DIGOXIN 0.25 MG/ML
250 INJECTION INTRAMUSCULAR; INTRAVENOUS ONCE
Qty: 1 ML | Refills: 0 | Status: SHIPPED | OUTPATIENT
Start: 2020-01-01 | End: 2020-01-01 | Stop reason: HOSPADM

## 2020-01-01 RX ORDER — LEVETIRACETAM 5 MG/ML
500 INJECTION INTRAVASCULAR 2 TIMES DAILY
Status: DISCONTINUED | OUTPATIENT
Start: 2020-01-01 | End: 2020-01-01

## 2020-01-01 RX ORDER — ONDANSETRON 2 MG/ML
4 INJECTION INTRAMUSCULAR; INTRAVENOUS ONCE
Status: COMPLETED | OUTPATIENT
Start: 2020-01-01 | End: 2020-01-01

## 2020-01-01 RX ORDER — HYDRALAZINE HYDROCHLORIDE 20 MG/ML
10 INJECTION INTRAMUSCULAR; INTRAVENOUS EVERY 10 MIN PRN
Status: DISCONTINUED | OUTPATIENT
Start: 2020-01-01 | End: 2020-01-01

## 2020-01-01 RX ADMIN — METFORMIN HYDROCHLORIDE 1000 MG: 1000 TABLET ORAL at 09:21

## 2020-01-01 RX ADMIN — FENTANYL CITRATE 25 MCG: 50 INJECTION, SOLUTION INTRAMUSCULAR; INTRAVENOUS at 12:29

## 2020-01-01 RX ADMIN — AMPICILLIN SODIUM AND SULBACTAM SODIUM 3 G: 2; 1 INJECTION, POWDER, FOR SOLUTION INTRAMUSCULAR; INTRAVENOUS at 08:05

## 2020-01-01 RX ADMIN — PHENYLEPHRINE HYDROCHLORIDE 100 MCG: 10 INJECTION INTRAVENOUS at 15:54

## 2020-01-01 RX ADMIN — INSULIN LISPRO 1 UNITS: 100 INJECTION, SOLUTION INTRAVENOUS; SUBCUTANEOUS at 17:08

## 2020-01-01 RX ADMIN — SODIUM CHLORIDE 1000 ML: 9 INJECTION, SOLUTION INTRAVENOUS at 17:00

## 2020-01-01 RX ADMIN — HEPARIN SODIUM 12 UNITS/KG/HR: 10000 INJECTION, SOLUTION INTRAVENOUS at 17:58

## 2020-01-01 RX ADMIN — DOCUSATE SODIUM 100 MG: 100 CAPSULE, LIQUID FILLED ORAL at 08:48

## 2020-01-01 RX ADMIN — CEFTRIAXONE SODIUM 1 G: 1 INJECTION, POWDER, FOR SOLUTION INTRAMUSCULAR; INTRAVENOUS at 04:15

## 2020-01-01 RX ADMIN — SACUBITRIL AND VALSARTAN 1 TABLET: 24; 26 TABLET, FILM COATED ORAL at 10:20

## 2020-01-01 RX ADMIN — DIGOXIN 125 MCG: 250 INJECTION, SOLUTION INTRAMUSCULAR; INTRAVENOUS; PARENTERAL at 09:00

## 2020-01-01 RX ADMIN — HEPARIN SODIUM 12 UNITS/KG/HR: 10000 INJECTION, SOLUTION INTRAVENOUS at 16:44

## 2020-01-01 RX ADMIN — CARVEDILOL 3.12 MG: 3.12 TABLET, FILM COATED ORAL at 09:26

## 2020-01-01 RX ADMIN — LORAZEPAM 0.5 MG: 2 INJECTION INTRAMUSCULAR; INTRAVENOUS at 13:24

## 2020-01-01 RX ADMIN — INSULIN LISPRO 1 UNITS: 100 INJECTION, SOLUTION INTRAVENOUS; SUBCUTANEOUS at 05:58

## 2020-01-01 RX ADMIN — PROPOFOL 10 MCG/KG/MIN: 10 INJECTION, EMULSION INTRAVENOUS at 20:01

## 2020-01-01 RX ADMIN — POLYETHYLENE GLYCOL 3350 17 G: 17 POWDER, FOR SOLUTION ORAL at 08:48

## 2020-01-01 RX ADMIN — FUROSEMIDE 40 MG: 10 INJECTION, SOLUTION INTRAMUSCULAR; INTRAVENOUS at 17:08

## 2020-01-01 RX ADMIN — INSULIN LISPRO 2 UNITS: 100 INJECTION, SOLUTION INTRAVENOUS; SUBCUTANEOUS at 11:16

## 2020-01-01 RX ADMIN — ATORVASTATIN CALCIUM 40 MG: 40 TABLET, FILM COATED ORAL at 14:30

## 2020-01-01 RX ADMIN — ATORVASTATIN CALCIUM 40 MG: 40 TABLET, FILM COATED ORAL at 08:02

## 2020-01-01 RX ADMIN — AMOXICILLIN AND CLAVULANATE POTASSIUM 1 TABLET: 875; 125 TABLET, FILM COATED ORAL at 08:49

## 2020-01-01 RX ADMIN — SENNOSIDES 5 ML: 8.8 LIQUID ORAL at 22:30

## 2020-01-01 RX ADMIN — SACUBITRIL AND VALSARTAN 1 TABLET: 24; 26 TABLET, FILM COATED ORAL at 22:18

## 2020-01-01 RX ADMIN — METOPROLOL SUCCINATE 12.5 MG: 25 TABLET, EXTENDED RELEASE ORAL at 07:55

## 2020-01-01 RX ADMIN — SACUBITRIL AND VALSARTAN 1 TABLET: 24; 26 TABLET, FILM COATED ORAL at 09:00

## 2020-01-01 RX ADMIN — PANTOPRAZOLE SODIUM 40 MG: 40 INJECTION, POWDER, FOR SOLUTION INTRAVENOUS at 07:59

## 2020-01-01 RX ADMIN — FENTANYL CITRATE 25 MCG: 50 INJECTION, SOLUTION INTRAMUSCULAR; INTRAVENOUS at 15:46

## 2020-01-01 RX ADMIN — SODIUM CHLORIDE, PRESERVATIVE FREE 10 ML: 5 INJECTION INTRAVENOUS at 08:13

## 2020-01-01 RX ADMIN — INSULIN LISPRO 6 UNITS: 100 INJECTION, SOLUTION INTRAVENOUS; SUBCUTANEOUS at 09:56

## 2020-01-01 RX ADMIN — AMPICILLIN SODIUM AND SULBACTAM SODIUM 3 G: 2; 1 INJECTION, POWDER, FOR SOLUTION INTRAMUSCULAR; INTRAVENOUS at 13:13

## 2020-01-01 RX ADMIN — CEFTRIAXONE 1 G: 1 INJECTION, POWDER, FOR SOLUTION INTRAMUSCULAR; INTRAVENOUS at 14:28

## 2020-01-01 RX ADMIN — AMOXICILLIN AND CLAVULANATE POTASSIUM 1 TABLET: 875; 125 TABLET, FILM COATED ORAL at 08:48

## 2020-01-01 RX ADMIN — POTASSIUM & SODIUM PHOSPHATES POWDER PACK 280-160-250 MG 250 MG: 280-160-250 PACK at 08:20

## 2020-01-01 RX ADMIN — DOCUSATE SODIUM 100 MG: 100 CAPSULE, LIQUID FILLED ORAL at 14:31

## 2020-01-01 RX ADMIN — METFORMIN HYDROCHLORIDE 1000 MG: 1000 TABLET ORAL at 17:18

## 2020-01-01 RX ADMIN — FENTANYL CITRATE 25 MCG: 50 INJECTION, SOLUTION INTRAMUSCULAR; INTRAVENOUS at 10:19

## 2020-01-01 RX ADMIN — POTASSIUM & SODIUM PHOSPHATES POWDER PACK 280-160-250 MG 250 MG: 280-160-250 PACK at 18:40

## 2020-01-01 RX ADMIN — SODIUM CHLORIDE, PRESERVATIVE FREE 10 ML: 5 INJECTION INTRAVENOUS at 09:39

## 2020-01-01 RX ADMIN — SPIRONOLACTONE 12.5 MG: 25 TABLET ORAL at 10:20

## 2020-01-01 RX ADMIN — INSULIN LISPRO 3 UNITS: 100 INJECTION, SOLUTION INTRAVENOUS; SUBCUTANEOUS at 01:45

## 2020-01-01 RX ADMIN — ASPIRIN 81 MG: 81 TABLET, COATED ORAL at 08:48

## 2020-01-01 RX ADMIN — FENTANYL CITRATE 50 MCG: 50 INJECTION, SOLUTION INTRAMUSCULAR; INTRAVENOUS at 15:14

## 2020-01-01 RX ADMIN — DIGOXIN 125 MCG: 125 TABLET ORAL at 08:02

## 2020-01-01 RX ADMIN — POLYETHYLENE GLYCOL 3350 17 G: 17 POWDER, FOR SOLUTION ORAL at 08:28

## 2020-01-01 RX ADMIN — SACUBITRIL AND VALSARTAN 1 TABLET: 24; 26 TABLET, FILM COATED ORAL at 08:49

## 2020-01-01 RX ADMIN — FUROSEMIDE 40 MG: 10 INJECTION, SOLUTION INTRAMUSCULAR; INTRAVENOUS at 08:47

## 2020-01-01 RX ADMIN — SODIUM CHLORIDE, PRESERVATIVE FREE 10 ML: 5 INJECTION INTRAVENOUS at 21:53

## 2020-01-01 RX ADMIN — SODIUM CHLORIDE: 9 INJECTION, SOLUTION INTRAVENOUS at 12:02

## 2020-01-01 RX ADMIN — AMOXICILLIN AND CLAVULANATE POTASSIUM 1 TABLET: 875; 125 TABLET, FILM COATED ORAL at 21:41

## 2020-01-01 RX ADMIN — PROPOFOL 20 MCG/KG/MIN: 10 INJECTION, EMULSION INTRAVENOUS at 01:40

## 2020-01-01 RX ADMIN — Medication 15 G: at 06:13

## 2020-01-01 RX ADMIN — POTASSIUM & SODIUM PHOSPHATES POWDER PACK 280-160-250 MG 250 MG: 280-160-250 PACK at 20:52

## 2020-01-01 RX ADMIN — LINAGLIPTIN 5 MG: 5 TABLET, FILM COATED ORAL at 09:22

## 2020-01-01 RX ADMIN — INSULIN GLARGINE 12 UNITS: 100 INJECTION, SOLUTION SUBCUTANEOUS at 19:45

## 2020-01-01 RX ADMIN — SACUBITRIL AND VALSARTAN 1 TABLET: 24; 26 TABLET, FILM COATED ORAL at 21:41

## 2020-01-01 RX ADMIN — AMPICILLIN SODIUM AND SULBACTAM SODIUM 3 G: 2; 1 INJECTION, POWDER, FOR SOLUTION INTRAMUSCULAR; INTRAVENOUS at 07:55

## 2020-01-01 RX ADMIN — CHLORHEXIDINE GLUCONATE 0.12% ORAL RINSE 15 ML: 1.2 LIQUID ORAL at 09:01

## 2020-01-01 RX ADMIN — HEPARIN SODIUM 14 UNITS/KG/HR: 10000 INJECTION, SOLUTION INTRAVENOUS at 20:43

## 2020-01-01 RX ADMIN — POTASSIUM & SODIUM PHOSPHATES POWDER PACK 280-160-250 MG 250 MG: 280-160-250 PACK at 17:46

## 2020-01-01 RX ADMIN — Medication 10 ML: at 07:57

## 2020-01-01 RX ADMIN — ATORVASTATIN CALCIUM 40 MG: 40 TABLET, FILM COATED ORAL at 20:52

## 2020-01-01 RX ADMIN — INSULIN LISPRO 6 UNITS: 100 INJECTION, SOLUTION INTRAVENOUS; SUBCUTANEOUS at 21:52

## 2020-01-01 RX ADMIN — METOPROLOL TARTRATE 2.5 MG: 5 INJECTION INTRAVENOUS at 08:30

## 2020-01-01 RX ADMIN — DOCUSATE SODIUM 100 MG: 100 CAPSULE, LIQUID FILLED ORAL at 08:49

## 2020-01-01 RX ADMIN — INSULIN LISPRO 1 UNITS: 100 INJECTION, SOLUTION INTRAVENOUS; SUBCUTANEOUS at 17:13

## 2020-01-01 RX ADMIN — POTASSIUM & SODIUM PHOSPHATES POWDER PACK 280-160-250 MG 250 MG: 280-160-250 PACK at 14:50

## 2020-01-01 RX ADMIN — INSULIN LISPRO 1 UNITS: 100 INJECTION, SOLUTION INTRAVENOUS; SUBCUTANEOUS at 01:53

## 2020-01-01 RX ADMIN — AMPICILLIN SODIUM AND SULBACTAM SODIUM 3 G: 2; 1 INJECTION, POWDER, FOR SOLUTION INTRAMUSCULAR; INTRAVENOUS at 01:41

## 2020-01-01 RX ADMIN — DOCUSATE SODIUM 100 MG: 100 CAPSULE, LIQUID FILLED ORAL at 09:49

## 2020-01-01 RX ADMIN — INSULIN LISPRO 2 UNITS: 100 INJECTION, SOLUTION INTRAVENOUS; SUBCUTANEOUS at 17:17

## 2020-01-01 RX ADMIN — RIVAROXABAN 20 MG: 20 TABLET, FILM COATED ORAL at 22:08

## 2020-01-01 RX ADMIN — MAGNESIUM SULFATE 4 G: 4 INJECTION INTRAVENOUS at 11:24

## 2020-01-01 RX ADMIN — METOPROLOL SUCCINATE 12.5 MG: 25 TABLET, EXTENDED RELEASE ORAL at 09:48

## 2020-01-01 RX ADMIN — FUROSEMIDE 40 MG: 10 INJECTION, SOLUTION INTRAMUSCULAR; INTRAVENOUS at 09:27

## 2020-01-01 RX ADMIN — ATORVASTATIN CALCIUM 40 MG: 40 TABLET, FILM COATED ORAL at 20:58

## 2020-01-01 RX ADMIN — SODIUM CHLORIDE 250 ML: 9 INJECTION, SOLUTION INTRAVENOUS at 14:28

## 2020-01-01 RX ADMIN — ENOXAPARIN SODIUM 70 MG: 80 INJECTION SUBCUTANEOUS at 20:50

## 2020-01-01 RX ADMIN — INSULIN LISPRO 3 UNITS: 100 INJECTION, SOLUTION INTRAVENOUS; SUBCUTANEOUS at 18:01

## 2020-01-01 RX ADMIN — INSULIN LISPRO 1 UNITS: 100 INJECTION, SOLUTION INTRAVENOUS; SUBCUTANEOUS at 04:51

## 2020-01-01 RX ADMIN — DIGOXIN 125 MCG: 250 INJECTION, SOLUTION INTRAMUSCULAR; INTRAVENOUS; PARENTERAL at 14:35

## 2020-01-01 RX ADMIN — FENTANYL CITRATE 25 MCG: 50 INJECTION, SOLUTION INTRAMUSCULAR; INTRAVENOUS at 23:05

## 2020-01-01 RX ADMIN — FUROSEMIDE 20 MG: 10 INJECTION, SOLUTION INTRAMUSCULAR; INTRAVENOUS at 14:30

## 2020-01-01 RX ADMIN — SODIUM CHLORIDE, PRESERVATIVE FREE 10 ML: 5 INJECTION INTRAVENOUS at 08:21

## 2020-01-01 RX ADMIN — METFORMIN HYDROCHLORIDE 1000 MG: 1000 TABLET ORAL at 16:34

## 2020-01-01 RX ADMIN — FUROSEMIDE 40 MG: 10 INJECTION, SOLUTION INTRAMUSCULAR; INTRAVENOUS at 08:49

## 2020-01-01 RX ADMIN — SPIRONOLACTONE 12.5 MG: 25 TABLET ORAL at 08:53

## 2020-01-01 RX ADMIN — PANTOPRAZOLE SODIUM 40 MG: 40 INJECTION, POWDER, FOR SOLUTION INTRAVENOUS at 14:50

## 2020-01-01 RX ADMIN — GLYCOPYRROLATE 0.2 MG: 0.2 INJECTION, SOLUTION INTRAMUSCULAR; INTRAVENOUS at 11:52

## 2020-01-01 RX ADMIN — INSULIN GLARGINE 12 UNITS: 100 INJECTION, SOLUTION SUBCUTANEOUS at 20:53

## 2020-01-01 RX ADMIN — SODIUM CHLORIDE 500 ML: 9 INJECTION, SOLUTION INTRAVENOUS at 13:10

## 2020-01-01 RX ADMIN — INSULIN GLARGINE 15 UNITS: 100 INJECTION, SOLUTION SUBCUTANEOUS at 21:52

## 2020-01-01 RX ADMIN — LEVETIRACETAM 500 MG: 5 INJECTION INTRAVENOUS at 08:29

## 2020-01-01 RX ADMIN — Medication 10 ML: at 21:20

## 2020-01-01 RX ADMIN — ISOSORBIDE MONONITRATE 30 MG: 30 TABLET, EXTENDED RELEASE ORAL at 09:26

## 2020-01-01 RX ADMIN — FUROSEMIDE 40 MG: 10 INJECTION, SOLUTION INTRAMUSCULAR; INTRAVENOUS at 07:57

## 2020-01-01 RX ADMIN — CEFAZOLIN SODIUM 2 G: 2 SOLUTION INTRAVENOUS at 15:13

## 2020-01-01 RX ADMIN — LORAZEPAM 0.5 MG: 2 INJECTION INTRAMUSCULAR; INTRAVENOUS at 15:58

## 2020-01-01 RX ADMIN — MAGNESIUM SULFATE HEPTAHYDRATE 2 G: 40 INJECTION, SOLUTION INTRAVENOUS at 12:02

## 2020-01-01 RX ADMIN — ENOXAPARIN SODIUM 70 MG: 80 INJECTION SUBCUTANEOUS at 21:40

## 2020-01-01 RX ADMIN — PROPOFOL 10 MCG/KG/MIN: 10 INJECTION, EMULSION INTRAVENOUS at 02:39

## 2020-01-01 RX ADMIN — PANTOPRAZOLE SODIUM 40 MG: 40 INJECTION, POWDER, FOR SOLUTION INTRAVENOUS at 08:28

## 2020-01-01 RX ADMIN — FUROSEMIDE 20 MG: 10 INJECTION, SOLUTION INTRAMUSCULAR; INTRAVENOUS at 13:24

## 2020-01-01 RX ADMIN — INSULIN LISPRO 3 UNITS: 100 INJECTION, SOLUTION INTRAVENOUS; SUBCUTANEOUS at 01:47

## 2020-01-01 RX ADMIN — INSULIN LISPRO 3 UNITS: 100 INJECTION, SOLUTION INTRAVENOUS; SUBCUTANEOUS at 14:48

## 2020-01-01 RX ADMIN — INSULIN LISPRO 3 UNITS: 100 INJECTION, SOLUTION INTRAVENOUS; SUBCUTANEOUS at 09:57

## 2020-01-01 RX ADMIN — SACUBITRIL AND VALSARTAN 1 TABLET: 24; 26 TABLET, FILM COATED ORAL at 21:19

## 2020-01-01 RX ADMIN — MORPHINE SULFATE 4 MG: 4 INJECTION, SOLUTION INTRAMUSCULAR; INTRAVENOUS at 12:21

## 2020-01-01 RX ADMIN — INSULIN LISPRO 1 UNITS: 100 INJECTION, SOLUTION INTRAVENOUS; SUBCUTANEOUS at 01:46

## 2020-01-01 RX ADMIN — Medication 10 ML: at 14:31

## 2020-01-01 RX ADMIN — ASPIRIN 81 MG: 81 TABLET, COATED ORAL at 08:49

## 2020-01-01 RX ADMIN — SENNOSIDES 5 ML: 8.8 LIQUID ORAL at 22:16

## 2020-01-01 RX ADMIN — LINAGLIPTIN 5 MG: 5 TABLET, FILM COATED ORAL at 17:18

## 2020-01-01 RX ADMIN — CHLORHEXIDINE GLUCONATE 0.12% ORAL RINSE 15 ML: 1.2 LIQUID ORAL at 09:17

## 2020-01-01 RX ADMIN — MORPHINE SULFATE 2 MG: 2 INJECTION, SOLUTION INTRAMUSCULAR; INTRAVENOUS at 17:50

## 2020-01-01 RX ADMIN — SODIUM CHLORIDE, PRESERVATIVE FREE 10 ML: 5 INJECTION INTRAVENOUS at 09:02

## 2020-01-01 RX ADMIN — PROPOFOL 50 MCG/KG/MIN: 10 INJECTION, EMULSION INTRAVENOUS at 15:11

## 2020-01-01 RX ADMIN — INSULIN LISPRO 3 UNITS: 100 INJECTION, SOLUTION INTRAVENOUS; SUBCUTANEOUS at 21:43

## 2020-01-01 RX ADMIN — PHENYLEPHRINE HYDROCHLORIDE 100 MCG: 10 INJECTION INTRAVENOUS at 16:12

## 2020-01-01 RX ADMIN — ENOXAPARIN SODIUM 70 MG: 80 INJECTION SUBCUTANEOUS at 21:04

## 2020-01-01 RX ADMIN — ATORVASTATIN CALCIUM 40 MG: 40 TABLET, FILM COATED ORAL at 21:53

## 2020-01-01 RX ADMIN — ISOSORBIDE MONONITRATE 30 MG: 30 TABLET, EXTENDED RELEASE ORAL at 07:55

## 2020-01-01 RX ADMIN — AMPICILLIN SODIUM AND SULBACTAM SODIUM 3 G: 2; 1 INJECTION, POWDER, FOR SOLUTION INTRAMUSCULAR; INTRAVENOUS at 19:00

## 2020-01-01 RX ADMIN — INSULIN LISPRO 3 UNITS: 100 INJECTION, SOLUTION INTRAVENOUS; SUBCUTANEOUS at 12:25

## 2020-01-01 RX ADMIN — CEFTRIAXONE SODIUM 1 G: 1 INJECTION, POWDER, FOR SOLUTION INTRAMUSCULAR; INTRAVENOUS at 18:22

## 2020-01-01 RX ADMIN — SODIUM CHLORIDE, PRESERVATIVE FREE 10 ML: 5 INJECTION INTRAVENOUS at 20:39

## 2020-01-01 RX ADMIN — POLYETHYLENE GLYCOL 3350 17 G: 17 POWDER, FOR SOLUTION ORAL at 08:01

## 2020-01-01 RX ADMIN — SPIRONOLACTONE 12.5 MG: 25 TABLET ORAL at 07:55

## 2020-01-01 RX ADMIN — LEVETIRACETAM 500 MG: 500 SOLUTION ORAL at 09:17

## 2020-01-01 RX ADMIN — SODIUM CHLORIDE, PRESERVATIVE FREE 10 ML: 5 INJECTION INTRAVENOUS at 08:29

## 2020-01-01 RX ADMIN — PANTOPRAZOLE SODIUM 40 MG: 40 INJECTION, POWDER, FOR SOLUTION INTRAVENOUS at 08:20

## 2020-01-01 RX ADMIN — Medication 10 ML: at 04:57

## 2020-01-01 RX ADMIN — MORPHINE SULFATE 2 MG: 2 INJECTION, SOLUTION INTRAMUSCULAR; INTRAVENOUS at 11:43

## 2020-01-01 RX ADMIN — ASPIRIN 81 MG: 81 TABLET, COATED ORAL at 09:22

## 2020-01-01 RX ADMIN — LEVETIRACETAM 500 MG: 5 INJECTION INTRAVENOUS at 20:57

## 2020-01-01 RX ADMIN — PANTOPRAZOLE SODIUM 40 MG: 40 INJECTION, POWDER, FOR SOLUTION INTRAVENOUS at 09:00

## 2020-01-01 RX ADMIN — LEVETIRACETAM 500 MG: 5 INJECTION INTRAVENOUS at 08:20

## 2020-01-01 RX ADMIN — AMOXICILLIN AND CLAVULANATE POTASSIUM 1 TABLET: 875; 125 TABLET, FILM COATED ORAL at 20:49

## 2020-01-01 RX ADMIN — HEPARIN SODIUM 14 UNITS/KG/HR: 10000 INJECTION, SOLUTION INTRAVENOUS at 18:39

## 2020-01-01 RX ADMIN — INSULIN LISPRO 3 UNITS: 100 INJECTION, SOLUTION INTRAVENOUS; SUBCUTANEOUS at 20:48

## 2020-01-01 RX ADMIN — INSULIN LISPRO 1 UNITS: 100 INJECTION, SOLUTION INTRAVENOUS; SUBCUTANEOUS at 19:44

## 2020-01-01 RX ADMIN — INSULIN LISPRO 3 UNITS: 100 INJECTION, SOLUTION INTRAVENOUS; SUBCUTANEOUS at 22:17

## 2020-01-01 RX ADMIN — SENNOSIDES 5 ML: 8.8 LIQUID ORAL at 20:39

## 2020-01-01 RX ADMIN — MAGNESIUM SULFATE HEPTAHYDRATE 2 G: 40 INJECTION, SOLUTION INTRAVENOUS at 08:12

## 2020-01-01 RX ADMIN — TAMSULOSIN HYDROCHLORIDE 0.4 MG: 0.4 CAPSULE ORAL at 09:48

## 2020-01-01 RX ADMIN — AMOXICILLIN AND CLAVULANATE POTASSIUM 1 TABLET: 875; 125 TABLET, FILM COATED ORAL at 21:04

## 2020-01-01 RX ADMIN — SODIUM CHLORIDE, PRESERVATIVE FREE 10 ML: 5 INJECTION INTRAVENOUS at 20:53

## 2020-01-01 RX ADMIN — POTASSIUM & SODIUM PHOSPHATES POWDER PACK 280-160-250 MG 250 MG: 280-160-250 PACK at 12:32

## 2020-01-01 RX ADMIN — PHENYLEPHRINE HYDROCHLORIDE 100 MCG: 10 INJECTION INTRAVENOUS at 15:41

## 2020-01-01 RX ADMIN — POTASSIUM & SODIUM PHOSPHATES POWDER PACK 280-160-250 MG 250 MG: 280-160-250 PACK at 21:16

## 2020-01-01 RX ADMIN — METOPROLOL TARTRATE 2.5 MG: 5 INJECTION INTRAVENOUS at 20:57

## 2020-01-01 RX ADMIN — METOPROLOL TARTRATE 2.5 MG: 5 INJECTION INTRAVENOUS at 03:01

## 2020-01-01 RX ADMIN — VECURONIUM BROMIDE 5 MG: 1 INJECTION, POWDER, LYOPHILIZED, FOR SOLUTION INTRAVENOUS at 04:55

## 2020-01-01 RX ADMIN — CHLORHEXIDINE GLUCONATE 0.12% ORAL RINSE 15 ML: 1.2 LIQUID ORAL at 21:17

## 2020-01-01 RX ADMIN — Medication 4 MCG/MIN: at 17:49

## 2020-01-01 RX ADMIN — POLYVINYL ALCOHOL 1 DROP: 14 SOLUTION/ DROPS OPHTHALMIC at 09:53

## 2020-01-01 RX ADMIN — METOPROLOL SUCCINATE 12.5 MG: 25 TABLET, EXTENDED RELEASE ORAL at 09:42

## 2020-01-01 RX ADMIN — FUROSEMIDE 20 MG: 10 INJECTION, SOLUTION INTRAMUSCULAR; INTRAVENOUS at 15:22

## 2020-01-01 RX ADMIN — POTASSIUM & SODIUM PHOSPHATES POWDER PACK 280-160-250 MG 250 MG: 280-160-250 PACK at 14:28

## 2020-01-01 RX ADMIN — DOCUSATE SODIUM 100 MG: 100 CAPSULE, LIQUID FILLED ORAL at 09:25

## 2020-01-01 RX ADMIN — POTASSIUM & SODIUM PHOSPHATES POWDER PACK 280-160-250 MG 250 MG: 280-160-250 PACK at 13:25

## 2020-01-01 RX ADMIN — ATORVASTATIN CALCIUM 40 MG: 40 TABLET, FILM COATED ORAL at 22:13

## 2020-01-01 RX ADMIN — METFORMIN HYDROCHLORIDE 1000 MG: 1000 TABLET ORAL at 17:30

## 2020-01-01 RX ADMIN — SACUBITRIL AND VALSARTAN 1 TABLET: 24; 26 TABLET, FILM COATED ORAL at 21:04

## 2020-01-01 RX ADMIN — MIDAZOLAM 1 MG: 1 INJECTION INTRAMUSCULAR; INTRAVENOUS at 14:47

## 2020-01-01 RX ADMIN — DIGOXIN 125 MCG: 125 TABLET ORAL at 08:48

## 2020-01-01 RX ADMIN — LORAZEPAM 0.5 MG: 2 INJECTION INTRAMUSCULAR; INTRAVENOUS at 11:49

## 2020-01-01 RX ADMIN — Medication 10 ML: at 22:18

## 2020-01-01 RX ADMIN — SENNOSIDES 5 ML: 8.8 LIQUID ORAL at 21:04

## 2020-01-01 RX ADMIN — METOPROLOL TARTRATE 2.5 MG: 5 INJECTION INTRAVENOUS at 20:52

## 2020-01-01 RX ADMIN — POTASSIUM & SODIUM PHOSPHATES POWDER PACK 280-160-250 MG 250 MG: 280-160-250 PACK at 09:01

## 2020-01-01 RX ADMIN — INSULIN LISPRO 2 UNITS: 100 INJECTION, SOLUTION INTRAVENOUS; SUBCUTANEOUS at 17:36

## 2020-01-01 RX ADMIN — FENTANYL CITRATE 25 MCG: 50 INJECTION, SOLUTION INTRAMUSCULAR; INTRAVENOUS at 08:21

## 2020-01-01 RX ADMIN — AMPICILLIN SODIUM AND SULBACTAM SODIUM 3 G: 2; 1 INJECTION, POWDER, FOR SOLUTION INTRAMUSCULAR; INTRAVENOUS at 09:09

## 2020-01-01 RX ADMIN — INSULIN LISPRO 3 UNITS: 100 INJECTION, SOLUTION INTRAVENOUS; SUBCUTANEOUS at 11:27

## 2020-01-01 RX ADMIN — INSULIN LISPRO 3 UNITS: 100 INJECTION, SOLUTION INTRAVENOUS; SUBCUTANEOUS at 19:19

## 2020-01-01 RX ADMIN — PANTOPRAZOLE SODIUM 40 MG: 40 INJECTION, POWDER, FOR SOLUTION INTRAVENOUS at 08:49

## 2020-01-01 RX ADMIN — METOPROLOL SUCCINATE 12.5 MG: 25 TABLET, FILM COATED, EXTENDED RELEASE ORAL at 09:01

## 2020-01-01 RX ADMIN — HEPARIN SODIUM 12 UNITS/KG/HR: 10000 INJECTION, SOLUTION INTRAVENOUS at 18:23

## 2020-01-01 RX ADMIN — IOPAMIDOL 100 ML: 755 INJECTION, SOLUTION INTRAVENOUS at 03:38

## 2020-01-01 RX ADMIN — DIGOXIN 125 MCG: 125 TABLET ORAL at 09:20

## 2020-01-01 RX ADMIN — INSULIN LISPRO 3 UNITS: 100 INJECTION, SOLUTION INTRAVENOUS; SUBCUTANEOUS at 10:22

## 2020-01-01 RX ADMIN — ATORVASTATIN CALCIUM 40 MG: 40 TABLET, FILM COATED ORAL at 09:25

## 2020-01-01 RX ADMIN — AMPICILLIN SODIUM AND SULBACTAM SODIUM 3 G: 2; 1 INJECTION, POWDER, FOR SOLUTION INTRAMUSCULAR; INTRAVENOUS at 01:45

## 2020-01-01 RX ADMIN — MORPHINE SULFATE 4 MG: 4 INJECTION, SOLUTION INTRAMUSCULAR; INTRAVENOUS at 12:53

## 2020-01-01 RX ADMIN — FENTANYL CITRATE 25 MCG: 50 INJECTION, SOLUTION INTRAMUSCULAR; INTRAVENOUS at 08:31

## 2020-01-01 RX ADMIN — Medication 10 ML: at 20:43

## 2020-01-01 RX ADMIN — AMPICILLIN SODIUM AND SULBACTAM SODIUM 3 G: 2; 1 INJECTION, POWDER, FOR SOLUTION INTRAMUSCULAR; INTRAVENOUS at 14:50

## 2020-01-01 RX ADMIN — SODIUM CHLORIDE: 9 INJECTION, SOLUTION INTRAVENOUS at 14:57

## 2020-01-01 RX ADMIN — CHLORHEXIDINE GLUCONATE 0.12% ORAL RINSE 15 ML: 1.2 LIQUID ORAL at 22:13

## 2020-01-01 RX ADMIN — SODIUM CHLORIDE, PRESERVATIVE FREE 10 ML: 5 INJECTION INTRAVENOUS at 22:16

## 2020-01-01 RX ADMIN — SODIUM CHLORIDE, PRESERVATIVE FREE 10 ML: 5 INJECTION INTRAVENOUS at 22:00

## 2020-01-01 RX ADMIN — POTASSIUM & SODIUM PHOSPHATES POWDER PACK 280-160-250 MG 250 MG: 280-160-250 PACK at 09:17

## 2020-01-01 RX ADMIN — WATER 10 ML: 1 INJECTION INTRAMUSCULAR; INTRAVENOUS; SUBCUTANEOUS at 04:57

## 2020-01-01 RX ADMIN — LEVETIRACETAM 500 MG: 5 INJECTION INTRAVENOUS at 20:39

## 2020-01-01 RX ADMIN — INSULIN LISPRO 3 UNITS: 100 INJECTION, SOLUTION INTRAVENOUS; SUBCUTANEOUS at 18:12

## 2020-01-01 RX ADMIN — DIGOXIN 125 MCG: 125 TABLET ORAL at 09:48

## 2020-01-01 RX ADMIN — DIGOXIN 125 MCG: 250 INJECTION, SOLUTION INTRAMUSCULAR; INTRAVENOUS; PARENTERAL at 08:20

## 2020-01-01 RX ADMIN — FENTANYL CITRATE 25 MCG: 50 INJECTION, SOLUTION INTRAMUSCULAR; INTRAVENOUS at 16:06

## 2020-01-01 RX ADMIN — PROPOFOL 14 MCG/KG/MIN: 10 INJECTION, EMULSION INTRAVENOUS at 05:21

## 2020-01-01 RX ADMIN — POLYVINYL ALCOHOL 1 DROP: 14 SOLUTION/ DROPS OPHTHALMIC at 08:22

## 2020-01-01 RX ADMIN — INSULIN LISPRO 3 UNITS: 100 INJECTION, SOLUTION INTRAVENOUS; SUBCUTANEOUS at 06:37

## 2020-01-01 RX ADMIN — INSULIN GLARGINE 12 UNITS: 100 INJECTION, SOLUTION SUBCUTANEOUS at 21:22

## 2020-01-01 RX ADMIN — SENNOSIDES 5 ML: 8.8 LIQUID ORAL at 20:53

## 2020-01-01 RX ADMIN — Medication 10 ML: at 21:53

## 2020-01-01 RX ADMIN — METOPROLOL SUCCINATE 12.5 MG: 25 TABLET, EXTENDED RELEASE ORAL at 09:26

## 2020-01-01 RX ADMIN — INSULIN LISPRO 2 UNITS: 100 INJECTION, SOLUTION INTRAVENOUS; SUBCUTANEOUS at 17:32

## 2020-01-01 RX ADMIN — SPIRONOLACTONE 12.5 MG: 25 TABLET ORAL at 09:20

## 2020-01-01 RX ADMIN — INSULIN LISPRO 3 UNITS: 100 INJECTION, SOLUTION INTRAVENOUS; SUBCUTANEOUS at 14:50

## 2020-01-01 RX ADMIN — POLYETHYLENE GLYCOL 3350 17 G: 17 POWDER, FOR SOLUTION ORAL at 09:01

## 2020-01-01 RX ADMIN — CHLORHEXIDINE GLUCONATE 0.12% ORAL RINSE 15 ML: 1.2 LIQUID ORAL at 08:29

## 2020-01-01 RX ADMIN — AMPICILLIN SODIUM AND SULBACTAM SODIUM 3 G: 2; 1 INJECTION, POWDER, FOR SOLUTION INTRAMUSCULAR; INTRAVENOUS at 06:32

## 2020-01-01 RX ADMIN — AMOXICILLIN AND CLAVULANATE POTASSIUM 1 TABLET: 875; 125 TABLET, FILM COATED ORAL at 09:22

## 2020-01-01 RX ADMIN — LORAZEPAM 0.5 MG: 2 INJECTION INTRAMUSCULAR; INTRAVENOUS at 12:53

## 2020-01-01 RX ADMIN — LEVETIRACETAM 500 MG: 5 INJECTION INTRAVENOUS at 20:52

## 2020-01-01 RX ADMIN — Medication 30 MILLICURIE: at 10:10

## 2020-01-01 RX ADMIN — CHLORHEXIDINE GLUCONATE 0.12% ORAL RINSE 15 ML: 1.2 LIQUID ORAL at 20:39

## 2020-01-01 RX ADMIN — MORPHINE SULFATE 4 MG: 4 INJECTION, SOLUTION INTRAMUSCULAR; INTRAVENOUS at 15:11

## 2020-01-01 RX ADMIN — MORPHINE SULFATE 4 MG: 4 INJECTION, SOLUTION INTRAMUSCULAR; INTRAVENOUS at 16:43

## 2020-01-01 RX ADMIN — SODIUM CHLORIDE: 9 INJECTION, SOLUTION INTRAVENOUS at 13:19

## 2020-01-01 RX ADMIN — SODIUM CHLORIDE, PRESERVATIVE FREE 10 ML: 5 INJECTION INTRAVENOUS at 08:48

## 2020-01-01 RX ADMIN — DIGOXIN 250 MCG: 0.25 INJECTION INTRAMUSCULAR; INTRAVENOUS at 00:50

## 2020-01-01 RX ADMIN — ATORVASTATIN CALCIUM 40 MG: 40 TABLET, FILM COATED ORAL at 20:39

## 2020-01-01 RX ADMIN — FUROSEMIDE 40 MG: 10 INJECTION, SOLUTION INTRAMUSCULAR; INTRAVENOUS at 09:12

## 2020-01-01 RX ADMIN — METFORMIN HYDROCHLORIDE 1000 MG: 1000 TABLET ORAL at 08:49

## 2020-01-01 RX ADMIN — AZITHROMYCIN MONOHYDRATE 500 MG: 500 INJECTION, POWDER, LYOPHILIZED, FOR SOLUTION INTRAVENOUS at 15:12

## 2020-01-01 RX ADMIN — CEFTRIAXONE 1 G: 1 INJECTION, POWDER, FOR SOLUTION INTRAMUSCULAR; INTRAVENOUS at 14:37

## 2020-01-01 RX ADMIN — AMPICILLIN SODIUM AND SULBACTAM SODIUM 3 G: 2; 1 INJECTION, POWDER, FOR SOLUTION INTRAMUSCULAR; INTRAVENOUS at 01:25

## 2020-01-01 RX ADMIN — ATORVASTATIN CALCIUM 40 MG: 40 TABLET, FILM COATED ORAL at 07:56

## 2020-01-01 RX ADMIN — ISOSORBIDE MONONITRATE 30 MG: 30 TABLET, EXTENDED RELEASE ORAL at 14:31

## 2020-01-01 RX ADMIN — INSULIN LISPRO 3 UNITS: 100 INJECTION, SOLUTION INTRAVENOUS; SUBCUTANEOUS at 06:13

## 2020-01-01 RX ADMIN — MIDAZOLAM 0.5 MG: 1 INJECTION INTRAMUSCULAR; INTRAVENOUS at 18:50

## 2020-01-01 RX ADMIN — LINAGLIPTIN 5 MG: 5 TABLET, FILM COATED ORAL at 09:12

## 2020-01-01 RX ADMIN — CARVEDILOL 3.12 MG: 3.12 TABLET, FILM COATED ORAL at 17:13

## 2020-01-01 RX ADMIN — FUROSEMIDE 40 MG: 40 TABLET ORAL at 09:00

## 2020-01-01 RX ADMIN — ATORVASTATIN CALCIUM 40 MG: 40 TABLET, FILM COATED ORAL at 21:16

## 2020-01-01 RX ADMIN — LEVETIRACETAM 500 MG: 500 SOLUTION ORAL at 21:53

## 2020-01-01 RX ADMIN — DIGOXIN 500 MCG: 0.25 INJECTION INTRAMUSCULAR; INTRAVENOUS at 17:17

## 2020-01-01 RX ADMIN — POLYETHYLENE GLYCOL 3350 17 G: 17 POWDER, FOR SOLUTION ORAL at 08:20

## 2020-01-01 RX ADMIN — FUROSEMIDE 40 MG: 10 INJECTION, SOLUTION INTRAMUSCULAR; INTRAVENOUS at 17:15

## 2020-01-01 RX ADMIN — FUROSEMIDE 20 MG: 10 INJECTION, SOLUTION INTRAMUSCULAR; INTRAVENOUS at 12:31

## 2020-01-01 RX ADMIN — LEVETIRACETAM 500 MG: 500 SOLUTION ORAL at 07:58

## 2020-01-01 RX ADMIN — DIGOXIN 125 MCG: 125 TABLET ORAL at 09:25

## 2020-01-01 RX ADMIN — PROPOFOL 30 MCG/KG/MIN: 10 INJECTION, EMULSION INTRAVENOUS at 09:51

## 2020-01-01 RX ADMIN — INSULIN LISPRO 3 UNITS: 100 INJECTION, SOLUTION INTRAVENOUS; SUBCUTANEOUS at 05:16

## 2020-01-01 RX ADMIN — LEVETIRACETAM 500 MG: 5 INJECTION INTRAVENOUS at 09:01

## 2020-01-01 RX ADMIN — METOPROLOL SUCCINATE 12.5 MG: 25 TABLET, EXTENDED RELEASE ORAL at 14:31

## 2020-01-01 RX ADMIN — Medication 10 ML: at 09:25

## 2020-01-01 RX ADMIN — INSULIN LISPRO 3 UNITS: 100 INJECTION, SOLUTION INTRAVENOUS; SUBCUTANEOUS at 01:59

## 2020-01-01 RX ADMIN — CHLORHEXIDINE GLUCONATE 0.12% ORAL RINSE 15 ML: 1.2 LIQUID ORAL at 23:00

## 2020-01-01 RX ADMIN — SODIUM CHLORIDE 1000 ML: 9 INJECTION, SOLUTION INTRAVENOUS at 14:25

## 2020-01-01 RX ADMIN — PROPOFOL 15 MCG/KG/MIN: 10 INJECTION, EMULSION INTRAVENOUS at 13:03

## 2020-01-01 RX ADMIN — DIGOXIN 125 MCG: 125 TABLET ORAL at 14:31

## 2020-01-01 RX ADMIN — Medication 10 ML: at 12:28

## 2020-01-01 RX ADMIN — DOCUSATE SODIUM 100 MG: 100 CAPSULE, LIQUID FILLED ORAL at 08:02

## 2020-01-01 RX ADMIN — POTASSIUM & SODIUM PHOSPHATES POWDER PACK 280-160-250 MG 250 MG: 280-160-250 PACK at 20:39

## 2020-01-01 RX ADMIN — SODIUM CHLORIDE, PRESERVATIVE FREE 10 ML: 5 INJECTION INTRAVENOUS at 08:31

## 2020-01-01 RX ADMIN — HEPARIN SODIUM 5020 UNITS: 1000 INJECTION, SOLUTION INTRAVENOUS; SUBCUTANEOUS at 16:44

## 2020-01-01 RX ADMIN — HEPARIN SODIUM 12 UNITS/KG/HR: 10000 INJECTION, SOLUTION INTRAVENOUS at 22:52

## 2020-01-01 RX ADMIN — INSULIN GLARGINE 12 UNITS: 100 INJECTION, SOLUTION SUBCUTANEOUS at 22:04

## 2020-01-01 RX ADMIN — MIDAZOLAM 2 MG: 1 INJECTION INTRAMUSCULAR; INTRAVENOUS at 13:34

## 2020-01-01 RX ADMIN — ONDANSETRON 4 MG: 2 INJECTION INTRAMUSCULAR; INTRAVENOUS at 18:22

## 2020-01-01 RX ADMIN — MIDAZOLAM 2 MG: 1 INJECTION INTRAMUSCULAR; INTRAVENOUS at 02:37

## 2020-01-01 RX ADMIN — MAGNESIUM SULFATE 2 G: 2 INJECTION INTRAVENOUS at 18:35

## 2020-01-01 RX ADMIN — AMOXICILLIN AND CLAVULANATE POTASSIUM 1 TABLET: 875; 125 TABLET, FILM COATED ORAL at 09:11

## 2020-01-01 RX ADMIN — SENNOSIDES 5 ML: 8.8 LIQUID ORAL at 21:54

## 2020-01-01 RX ADMIN — INSULIN GLARGINE 10 UNITS: 100 INJECTION, SOLUTION SUBCUTANEOUS at 21:53

## 2020-01-01 RX ADMIN — INSULIN LISPRO 1 UNITS: 100 INJECTION, SOLUTION INTRAVENOUS; SUBCUTANEOUS at 20:43

## 2020-01-01 RX ADMIN — FUROSEMIDE 20 MG: 10 INJECTION, SOLUTION INTRAMUSCULAR; INTRAVENOUS at 17:50

## 2020-01-01 RX ADMIN — CHLORHEXIDINE GLUCONATE 0.12% ORAL RINSE 15 ML: 1.2 LIQUID ORAL at 07:59

## 2020-01-01 RX ADMIN — BISACODYL 10 MG: 10 SUPPOSITORY RECTAL at 13:25

## 2020-01-01 RX ADMIN — AMOXICILLIN AND CLAVULANATE POTASSIUM 1 TABLET: 875; 125 TABLET, FILM COATED ORAL at 19:51

## 2020-01-01 RX ADMIN — AMPICILLIN SODIUM AND SULBACTAM SODIUM 3 G: 2; 1 INJECTION, POWDER, FOR SOLUTION INTRAMUSCULAR; INTRAVENOUS at 05:38

## 2020-01-01 RX ADMIN — AMPICILLIN SODIUM AND SULBACTAM SODIUM 3 G: 2; 1 INJECTION, POWDER, FOR SOLUTION INTRAMUSCULAR; INTRAVENOUS at 12:59

## 2020-01-01 RX ADMIN — TAMSULOSIN HYDROCHLORIDE 0.4 MG: 0.4 CAPSULE ORAL at 07:56

## 2020-01-01 RX ADMIN — HEPARIN SODIUM 2.51 UNITS: 1000 INJECTION INTRAVENOUS; SUBCUTANEOUS at 07:16

## 2020-01-01 RX ADMIN — REGADENOSON 0.4 MG: 0.08 INJECTION, SOLUTION INTRAVENOUS at 11:05

## 2020-01-01 RX ADMIN — SACUBITRIL AND VALSARTAN 1 TABLET: 24; 26 TABLET, FILM COATED ORAL at 20:49

## 2020-01-01 RX ADMIN — Medication 10 ML: at 21:54

## 2020-01-01 RX ADMIN — SACUBITRIL AND VALSARTAN 1 TABLET: 24; 26 TABLET, FILM COATED ORAL at 09:11

## 2020-01-01 RX ADMIN — DIGOXIN 125 MCG: 125 TABLET ORAL at 08:52

## 2020-01-01 RX ADMIN — PROPOFOL 10 MCG/KG/MIN: 10 INJECTION, EMULSION INTRAVENOUS at 08:04

## 2020-01-01 RX ADMIN — FUROSEMIDE 20 MG: 10 INJECTION, SOLUTION INTRAMUSCULAR; INTRAVENOUS at 14:19

## 2020-01-01 RX ADMIN — GLYCOPYRROLATE 0.2 MG: 0.2 INJECTION, SOLUTION INTRAMUSCULAR; INTRAVENOUS at 15:43

## 2020-01-01 RX ADMIN — MIDAZOLAM 1 MG: 1 INJECTION INTRAMUSCULAR; INTRAVENOUS at 16:42

## 2020-01-01 RX ADMIN — POTASSIUM & SODIUM PHOSPHATES POWDER PACK 280-160-250 MG 250 MG: 280-160-250 PACK at 08:01

## 2020-01-01 RX ADMIN — METFORMIN HYDROCHLORIDE 1000 MG: 1000 TABLET ORAL at 17:07

## 2020-01-01 RX ADMIN — CHLORHEXIDINE GLUCONATE 0.12% ORAL RINSE 15 ML: 1.2 LIQUID ORAL at 20:52

## 2020-01-01 RX ADMIN — INSULIN LISPRO 6 UNITS: 100 INJECTION, SOLUTION INTRAVENOUS; SUBCUTANEOUS at 18:39

## 2020-01-01 RX ADMIN — CHLORHEXIDINE GLUCONATE 0.12% ORAL RINSE 15 ML: 1.2 LIQUID ORAL at 21:53

## 2020-01-01 RX ADMIN — MAGNESIUM SULFATE 4 G: 4 INJECTION INTRAVENOUS at 11:07

## 2020-01-01 RX ADMIN — LORAZEPAM 0.5 MG: 2 INJECTION INTRAMUSCULAR; INTRAVENOUS at 12:36

## 2020-01-01 RX ADMIN — SENNOSIDES 5 ML: 8.8 LIQUID ORAL at 21:17

## 2020-01-01 RX ADMIN — DIGOXIN 125 MCG: 125 TABLET ORAL at 07:56

## 2020-01-01 RX ADMIN — INSULIN LISPRO 4 UNITS: 100 INJECTION, SOLUTION INTRAVENOUS; SUBCUTANEOUS at 09:49

## 2020-01-01 RX ADMIN — AMPICILLIN SODIUM AND SULBACTAM SODIUM 3 G: 2; 1 INJECTION, POWDER, FOR SOLUTION INTRAMUSCULAR; INTRAVENOUS at 18:07

## 2020-01-01 RX ADMIN — CARVEDILOL 3.12 MG: 3.12 TABLET, FILM COATED ORAL at 17:18

## 2020-01-01 RX ADMIN — INSULIN LISPRO 6 UNITS: 100 INJECTION, SOLUTION INTRAVENOUS; SUBCUTANEOUS at 21:43

## 2020-01-01 RX ADMIN — AMPICILLIN SODIUM AND SULBACTAM SODIUM 3 G: 2; 1 INJECTION, POWDER, FOR SOLUTION INTRAMUSCULAR; INTRAVENOUS at 13:25

## 2020-01-01 RX ADMIN — LEVETIRACETAM 500 MG: 5 INJECTION INTRAVENOUS at 23:30

## 2020-01-01 RX ADMIN — INSULIN LISPRO 1 UNITS: 100 INJECTION, SOLUTION INTRAVENOUS; SUBCUTANEOUS at 11:19

## 2020-01-01 RX ADMIN — INSULIN LISPRO 2 UNITS: 100 INJECTION, SOLUTION INTRAVENOUS; SUBCUTANEOUS at 21:34

## 2020-01-01 RX ADMIN — AMPICILLIN SODIUM AND SULBACTAM SODIUM 3 G: 2; 1 INJECTION, POWDER, FOR SOLUTION INTRAMUSCULAR; INTRAVENOUS at 18:59

## 2020-01-01 RX ADMIN — ENOXAPARIN SODIUM 70 MG: 80 INJECTION SUBCUTANEOUS at 08:50

## 2020-01-01 RX ADMIN — DIGOXIN 125 MCG: 125 TABLET ORAL at 09:17

## 2020-01-01 RX ADMIN — FENTANYL CITRATE 50 MCG: 50 INJECTION, SOLUTION INTRAMUSCULAR; INTRAVENOUS at 16:16

## 2020-01-01 RX ADMIN — LEVETIRACETAM 1000 MG: 10 INJECTION INTRAVENOUS at 13:10

## 2020-01-01 RX ADMIN — SODIUM CHLORIDE, PRESERVATIVE FREE 10 ML: 5 INJECTION INTRAVENOUS at 20:58

## 2020-01-01 RX ADMIN — INSULIN LISPRO 6 UNITS: 100 INJECTION, SOLUTION INTRAVENOUS; SUBCUTANEOUS at 06:10

## 2020-01-01 RX ADMIN — LINAGLIPTIN 5 MG: 5 TABLET, FILM COATED ORAL at 09:00

## 2020-01-01 RX ADMIN — LORAZEPAM 0.5 MG: 2 INJECTION INTRAMUSCULAR; INTRAVENOUS at 17:50

## 2020-01-01 RX ADMIN — MORPHINE SULFATE 4 MG: 4 INJECTION, SOLUTION INTRAMUSCULAR; INTRAVENOUS at 14:22

## 2020-01-01 RX ADMIN — MORPHINE SULFATE 4 MG: 4 INJECTION, SOLUTION INTRAMUSCULAR; INTRAVENOUS at 13:24

## 2020-01-01 RX ADMIN — SACUBITRIL AND VALSARTAN 1 TABLET: 24; 26 TABLET, FILM COATED ORAL at 09:20

## 2020-01-01 RX ADMIN — ACETAMINOPHEN 650 MG: 325 TABLET ORAL at 20:52

## 2020-01-01 RX ADMIN — DIGOXIN 125 MCG: 250 INJECTION, SOLUTION INTRAMUSCULAR; INTRAVENOUS; PARENTERAL at 08:29

## 2020-01-01 RX ADMIN — RIVAROXABAN 20 MG: 20 TABLET, FILM COATED ORAL at 17:19

## 2020-01-01 RX ADMIN — POTASSIUM & SODIUM PHOSPHATES POWDER PACK 280-160-250 MG 250 MG: 280-160-250 PACK at 17:32

## 2020-01-01 RX ADMIN — INSULIN GLARGINE 10 UNITS: 100 INJECTION, SOLUTION SUBCUTANEOUS at 21:28

## 2020-01-01 RX ADMIN — AMPICILLIN SODIUM AND SULBACTAM SODIUM 3 G: 2; 1 INJECTION, POWDER, FOR SOLUTION INTRAMUSCULAR; INTRAVENOUS at 19:33

## 2020-01-01 RX ADMIN — POTASSIUM & SODIUM PHOSPHATES POWDER PACK 280-160-250 MG 250 MG: 280-160-250 PACK at 19:17

## 2020-01-01 RX ADMIN — ENOXAPARIN SODIUM 70 MG: 80 INJECTION SUBCUTANEOUS at 08:48

## 2020-01-01 RX ADMIN — DOCUSATE SODIUM 100 MG: 100 CAPSULE, LIQUID FILLED ORAL at 07:55

## 2020-01-01 RX ADMIN — MORPHINE SULFATE 4 MG: 4 INJECTION, SOLUTION INTRAMUSCULAR; INTRAVENOUS at 18:50

## 2020-01-01 RX ADMIN — SODIUM CHLORIDE: 9 INJECTION, SOLUTION INTRAVENOUS at 17:38

## 2020-01-01 RX ADMIN — SACUBITRIL AND VALSARTAN 1 TABLET: 24; 26 TABLET, FILM COATED ORAL at 10:11

## 2020-01-01 RX ADMIN — AMPICILLIN SODIUM AND SULBACTAM SODIUM 3 G: 2; 1 INJECTION, POWDER, FOR SOLUTION INTRAMUSCULAR; INTRAVENOUS at 01:36

## 2020-01-01 RX ADMIN — CARVEDILOL 3.12 MG: 3.12 TABLET, FILM COATED ORAL at 09:12

## 2020-01-01 RX ADMIN — INSULIN GLARGINE 10 UNITS: 100 INJECTION, SOLUTION SUBCUTANEOUS at 21:34

## 2020-01-01 RX ADMIN — AMPICILLIN SODIUM AND SULBACTAM SODIUM 3 G: 2; 1 INJECTION, POWDER, FOR SOLUTION INTRAMUSCULAR; INTRAVENOUS at 05:07

## 2020-01-01 RX ADMIN — CHLORHEXIDINE GLUCONATE 0.12% ORAL RINSE 15 ML: 1.2 LIQUID ORAL at 08:28

## 2020-01-01 RX ADMIN — FUROSEMIDE 40 MG: 10 INJECTION, SOLUTION INTRAMUSCULAR; INTRAVENOUS at 09:24

## 2020-01-01 RX ADMIN — INSULIN LISPRO 3 UNITS: 100 INJECTION, SOLUTION INTRAVENOUS; SUBCUTANEOUS at 17:50

## 2020-01-01 RX ADMIN — FUROSEMIDE 40 MG: 10 INJECTION, SOLUTION INTRAMUSCULAR; INTRAVENOUS at 16:33

## 2020-01-01 RX ADMIN — METOPROLOL TARTRATE 2.5 MG: 5 INJECTION INTRAVENOUS at 20:39

## 2020-01-01 RX ADMIN — ENOXAPARIN SODIUM 40 MG: 40 INJECTION SUBCUTANEOUS at 09:12

## 2020-01-01 RX ADMIN — AMPICILLIN SODIUM AND SULBACTAM SODIUM 3 G: 2; 1 INJECTION, POWDER, FOR SOLUTION INTRAMUSCULAR; INTRAVENOUS at 14:28

## 2020-01-01 RX ADMIN — INSULIN LISPRO 2 UNITS: 100 INJECTION, SOLUTION INTRAVENOUS; SUBCUTANEOUS at 11:11

## 2020-01-01 RX ADMIN — SPIRONOLACTONE 12.5 MG: 25 TABLET ORAL at 12:24

## 2020-01-01 RX ADMIN — LEVETIRACETAM 500 MG: 5 INJECTION INTRAVENOUS at 22:14

## 2020-01-01 RX ADMIN — CHLORHEXIDINE GLUCONATE 0.12% ORAL RINSE 15 ML: 1.2 LIQUID ORAL at 08:20

## 2020-01-01 RX ADMIN — ASPIRIN 81 MG: 81 TABLET, COATED ORAL at 09:12

## 2020-01-01 RX ADMIN — FUROSEMIDE 40 MG: 10 INJECTION, SOLUTION INTRAMUSCULAR; INTRAVENOUS at 12:25

## 2020-01-01 RX ADMIN — SODIUM CHLORIDE, PRESERVATIVE FREE 10 ML: 5 INJECTION INTRAVENOUS at 08:28

## 2020-01-01 RX ADMIN — INSULIN LISPRO 2 UNITS: 100 INJECTION, SOLUTION INTRAVENOUS; SUBCUTANEOUS at 22:04

## 2020-01-01 RX ADMIN — CHLORHEXIDINE GLUCONATE 0.12% ORAL RINSE 15 ML: 1.2 LIQUID ORAL at 08:24

## 2020-01-01 RX ADMIN — LINAGLIPTIN 5 MG: 5 TABLET, FILM COATED ORAL at 08:49

## 2020-01-01 RX ADMIN — TAMSULOSIN HYDROCHLORIDE 0.4 MG: 0.4 CAPSULE ORAL at 09:25

## 2020-01-01 RX ADMIN — METFORMIN HYDROCHLORIDE 1000 MG: 1000 TABLET ORAL at 17:13

## 2020-01-01 RX ADMIN — SPIRONOLACTONE 12.5 MG: 25 TABLET ORAL at 09:11

## 2020-01-01 RX ADMIN — CEFTRIAXONE 1 G: 1 INJECTION, POWDER, FOR SOLUTION INTRAMUSCULAR; INTRAVENOUS at 14:32

## 2020-01-01 RX ADMIN — METFORMIN HYDROCHLORIDE 1000 MG: 1000 TABLET ORAL at 17:17

## 2020-01-01 RX ADMIN — Medication 10 MILLICURIE: at 10:10

## 2020-01-01 RX ADMIN — AMPICILLIN SODIUM AND SULBACTAM SODIUM 3 G: 2; 1 INJECTION, POWDER, FOR SOLUTION INTRAMUSCULAR; INTRAVENOUS at 06:20

## 2020-01-01 RX ADMIN — INSULIN LISPRO 1 UNITS: 100 INJECTION, SOLUTION INTRAVENOUS; SUBCUTANEOUS at 06:35

## 2020-01-01 RX ADMIN — INSULIN LISPRO 9 UNITS: 100 INJECTION, SOLUTION INTRAVENOUS; SUBCUTANEOUS at 18:23

## 2020-01-01 RX ADMIN — AMPICILLIN SODIUM AND SULBACTAM SODIUM 3 G: 2; 1 INJECTION, POWDER, FOR SOLUTION INTRAMUSCULAR; INTRAVENOUS at 01:47

## 2020-01-01 RX ADMIN — SPIRONOLACTONE 12.5 MG: 25 TABLET ORAL at 09:01

## 2020-01-01 RX ADMIN — FENTANYL CITRATE 25 MCG: 50 INJECTION, SOLUTION INTRAMUSCULAR; INTRAVENOUS at 15:25

## 2020-01-01 RX ADMIN — ISOSORBIDE MONONITRATE 30 MG: 30 TABLET, EXTENDED RELEASE ORAL at 09:48

## 2020-01-01 RX ADMIN — INSULIN LISPRO 3 UNITS: 100 INJECTION, SOLUTION INTRAVENOUS; SUBCUTANEOUS at 10:13

## 2020-01-01 RX ADMIN — CHLORHEXIDINE GLUCONATE 0.12% ORAL RINSE 15 ML: 1.2 LIQUID ORAL at 10:17

## 2020-01-01 RX ADMIN — SACUBITRIL AND VALSARTAN 1 TABLET: 24; 26 TABLET, FILM COATED ORAL at 09:25

## 2020-01-01 RX ADMIN — INSULIN LISPRO 3 UNITS: 100 INJECTION, SOLUTION INTRAVENOUS; SUBCUTANEOUS at 21:27

## 2020-01-01 RX ADMIN — MORPHINE SULFATE 4 MG: 4 INJECTION, SOLUTION INTRAMUSCULAR; INTRAVENOUS at 13:56

## 2020-01-01 RX ADMIN — ENOXAPARIN SODIUM 70 MG: 80 INJECTION SUBCUTANEOUS at 09:24

## 2020-01-01 RX ADMIN — SODIUM CHLORIDE, PRESERVATIVE FREE 10 ML: 5 INJECTION INTRAVENOUS at 07:58

## 2020-01-01 RX ADMIN — CARVEDILOL 3.12 MG: 3.12 TABLET, FILM COATED ORAL at 08:48

## 2020-01-01 RX ADMIN — INSULIN LISPRO 3 UNITS: 100 INJECTION, SOLUTION INTRAVENOUS; SUBCUTANEOUS at 06:20

## 2020-01-01 RX ADMIN — PROPOFOL 14 MCG/KG/MIN: 10 INJECTION, EMULSION INTRAVENOUS at 13:35

## 2020-01-01 RX ADMIN — LINAGLIPTIN 5 MG: 5 TABLET, FILM COATED ORAL at 08:48

## 2020-01-01 RX ADMIN — Medication 10 ML: at 09:50

## 2020-01-01 RX ADMIN — PHENYLEPHRINE HYDROCHLORIDE 100 MCG: 10 INJECTION INTRAVENOUS at 16:24

## 2020-01-01 RX ADMIN — INSULIN LISPRO 1 UNITS: 100 INJECTION, SOLUTION INTRAVENOUS; SUBCUTANEOUS at 14:40

## 2020-01-01 RX ADMIN — INSULIN LISPRO 6 UNITS: 100 INJECTION, SOLUTION INTRAVENOUS; SUBCUTANEOUS at 02:09

## 2020-01-01 RX ADMIN — AMPICILLIN SODIUM AND SULBACTAM SODIUM 3 G: 2; 1 INJECTION, POWDER, FOR SOLUTION INTRAMUSCULAR; INTRAVENOUS at 06:21

## 2020-01-01 RX ADMIN — INSULIN LISPRO 2 UNITS: 100 INJECTION, SOLUTION INTRAVENOUS; SUBCUTANEOUS at 21:52

## 2020-01-01 RX ADMIN — SODIUM CHLORIDE, PRESERVATIVE FREE 10 ML: 5 INJECTION INTRAVENOUS at 14:51

## 2020-01-01 RX ADMIN — MORPHINE SULFATE 4 MG: 4 INJECTION, SOLUTION INTRAMUSCULAR; INTRAVENOUS at 12:36

## 2020-01-01 RX ADMIN — MIDAZOLAM 2 MG: 1 INJECTION INTRAMUSCULAR; INTRAVENOUS at 04:55

## 2020-01-01 RX ADMIN — CHLORHEXIDINE GLUCONATE 0.12% ORAL RINSE 15 ML: 1.2 LIQUID ORAL at 20:57

## 2020-01-01 RX ADMIN — LORAZEPAM 0.5 MG: 2 INJECTION INTRAMUSCULAR; INTRAVENOUS at 12:20

## 2020-01-01 RX ADMIN — ATORVASTATIN CALCIUM 40 MG: 40 TABLET, FILM COATED ORAL at 09:48

## 2020-01-01 RX ADMIN — MIDAZOLAM 1 MG: 1 INJECTION INTRAMUSCULAR; INTRAVENOUS at 13:56

## 2020-01-01 RX ADMIN — POLYETHYLENE GLYCOL 3350 17 G: 17 POWDER, FOR SOLUTION ORAL at 14:33

## 2020-01-01 RX ADMIN — METFORMIN HYDROCHLORIDE 1000 MG: 1000 TABLET ORAL at 09:00

## 2020-01-01 RX ADMIN — MORPHINE SULFATE 4 MG: 4 INJECTION, SOLUTION INTRAMUSCULAR; INTRAVENOUS at 15:58

## 2020-01-01 RX ADMIN — AMPICILLIN SODIUM AND SULBACTAM SODIUM 3 G: 2; 1 INJECTION, POWDER, FOR SOLUTION INTRAMUSCULAR; INTRAVENOUS at 19:23

## 2020-01-01 RX ADMIN — AMPICILLIN SODIUM AND SULBACTAM SODIUM 3 G: 2; 1 INJECTION, POWDER, FOR SOLUTION INTRAMUSCULAR; INTRAVENOUS at 14:49

## 2020-01-01 RX ADMIN — SODIUM CHLORIDE: 9 INJECTION, SOLUTION INTRAVENOUS at 22:39

## 2020-01-01 RX ADMIN — SODIUM CHLORIDE, PRESERVATIVE FREE 10 ML: 5 INJECTION INTRAVENOUS at 21:17

## 2020-01-01 RX ADMIN — TAMSULOSIN HYDROCHLORIDE 0.4 MG: 0.4 CAPSULE ORAL at 17:24

## 2020-01-01 RX ADMIN — SODIUM CHLORIDE: 9 INJECTION, SOLUTION INTRAVENOUS at 02:47

## 2020-01-01 RX ADMIN — METFORMIN HYDROCHLORIDE 1000 MG: 1000 TABLET ORAL at 08:48

## 2020-01-01 RX ADMIN — METOPROLOL TARTRATE 2.5 MG: 5 INJECTION INTRAVENOUS at 01:45

## 2020-01-01 RX ADMIN — INSULIN LISPRO 2 UNITS: 100 INJECTION, SOLUTION INTRAVENOUS; SUBCUTANEOUS at 17:30

## 2020-01-01 RX ADMIN — INSULIN LISPRO 1 UNITS: 100 INJECTION, SOLUTION INTRAVENOUS; SUBCUTANEOUS at 16:34

## 2020-01-01 RX ADMIN — VECURONIUM BROMIDE 5 MG: 1 INJECTION, POWDER, LYOPHILIZED, FOR SOLUTION INTRAVENOUS at 02:38

## 2020-01-01 RX ADMIN — AMPICILLIN SODIUM AND SULBACTAM SODIUM 3 G: 2; 1 INJECTION, POWDER, FOR SOLUTION INTRAMUSCULAR; INTRAVENOUS at 14:44

## 2020-01-01 RX ADMIN — LEVETIRACETAM 500 MG: 500 SOLUTION ORAL at 21:16

## 2020-01-01 RX ADMIN — METFORMIN HYDROCHLORIDE 1000 MG: 1000 TABLET ORAL at 09:11

## 2020-01-01 RX ADMIN — DIGOXIN 125 MCG: 250 INJECTION, SOLUTION INTRAMUSCULAR; INTRAVENOUS; PARENTERAL at 08:33

## 2020-01-01 RX ADMIN — INSULIN LISPRO 1 UNITS: 100 INJECTION, SOLUTION INTRAVENOUS; SUBCUTANEOUS at 16:41

## 2020-01-01 RX ADMIN — LEVETIRACETAM 500 MG: 500 SOLUTION ORAL at 08:20

## 2020-01-01 RX ADMIN — PANTOPRAZOLE SODIUM 40 MG: 40 INJECTION, POWDER, FOR SOLUTION INTRAVENOUS at 09:17

## 2020-01-01 RX ADMIN — POTASSIUM & SODIUM PHOSPHATES POWDER PACK 280-160-250 MG 250 MG: 280-160-250 PACK at 21:53

## 2020-01-01 RX ADMIN — FENTANYL CITRATE 25 MCG: 50 INJECTION, SOLUTION INTRAMUSCULAR; INTRAVENOUS at 15:54

## 2020-01-01 ASSESSMENT — PULMONARY FUNCTION TESTS
PIF_VALUE: 20
PIF_VALUE: 38
PIF_VALUE: 17
PIF_VALUE: 0
PIF_VALUE: 19
PIF_VALUE: 0
PIF_VALUE: 34
PIF_VALUE: 19
PIF_VALUE: 16
PIF_VALUE: 18
PIF_VALUE: 0
PIF_VALUE: 16
PIF_VALUE: 21
PIF_VALUE: 21
PIF_VALUE: 18
PIF_VALUE: 20
PIF_VALUE: 20
PIF_VALUE: 27
PIF_VALUE: 26
PIF_VALUE: 24
PIF_VALUE: 20
PIF_VALUE: 0
PIF_VALUE: 31
PIF_VALUE: 21
PIF_VALUE: 27
PIF_VALUE: 17
PIF_VALUE: 29
PIF_VALUE: 16
PIF_VALUE: 19
PIF_VALUE: 0
PIF_VALUE: 19
PIF_VALUE: 27
PIF_VALUE: 18
PIF_VALUE: 19
PIF_VALUE: 20
PIF_VALUE: 0
PIF_VALUE: 21
PIF_VALUE: 20
PIF_VALUE: 17
PIF_VALUE: 0
PIF_VALUE: 17
PIF_VALUE: 0
PIF_VALUE: 18
PIF_VALUE: 23
PIF_VALUE: 20
PIF_VALUE: 20
PIF_VALUE: 16
PIF_VALUE: 19
PIF_VALUE: 23
PIF_VALUE: 25
PIF_VALUE: 20
PIF_VALUE: 24
PIF_VALUE: 18
PIF_VALUE: 15
PIF_VALUE: 0
PIF_VALUE: 19
PIF_VALUE: 40
PIF_VALUE: 20
PIF_VALUE: 26
PIF_VALUE: 17
PIF_VALUE: 24
PIF_VALUE: 0
PIF_VALUE: 22
PIF_VALUE: 0
PIF_VALUE: 17
PIF_VALUE: 22
PIF_VALUE: 25
PIF_VALUE: 0
PIF_VALUE: 0
PIF_VALUE: 21
PIF_VALUE: 22
PIF_VALUE: 17
PIF_VALUE: 18
PIF_VALUE: 20
PIF_VALUE: 16
PIF_VALUE: 18
PIF_VALUE: 0
PIF_VALUE: 38
PIF_VALUE: 20
PIF_VALUE: 18
PIF_VALUE: 0
PIF_VALUE: 29
PIF_VALUE: 29
PIF_VALUE: 22
PIF_VALUE: 17
PIF_VALUE: 0
PIF_VALUE: 0
PIF_VALUE: 26
PIF_VALUE: 30
PIF_VALUE: 21
PIF_VALUE: 15
PIF_VALUE: 16
PIF_VALUE: 21
PIF_VALUE: 22
PIF_VALUE: 19
PIF_VALUE: 31
PIF_VALUE: 0
PIF_VALUE: 23
PIF_VALUE: 16
PIF_VALUE: 24
PIF_VALUE: 25
PIF_VALUE: 0
PIF_VALUE: 0
PIF_VALUE: 19
PIF_VALUE: 22
PIF_VALUE: 19
PIF_VALUE: 0
PIF_VALUE: 19
PIF_VALUE: 26
PIF_VALUE: 0
PIF_VALUE: 18
PIF_VALUE: 27
PIF_VALUE: 0
PIF_VALUE: 17
PIF_VALUE: 0
PIF_VALUE: 20
PIF_VALUE: 18
PIF_VALUE: 0
PIF_VALUE: 25
PIF_VALUE: 21
PIF_VALUE: 0
PIF_VALUE: 0
PIF_VALUE: 19
PIF_VALUE: 23
PIF_VALUE: 27
PIF_VALUE: 19
PIF_VALUE: 0
PIF_VALUE: 17
PIF_VALUE: 0
PIF_VALUE: 22
PIF_VALUE: 0
PIF_VALUE: 25
PIF_VALUE: 20
PIF_VALUE: 25
PIF_VALUE: 20
PIF_VALUE: 17
PIF_VALUE: 26
PIF_VALUE: 0
PIF_VALUE: 20
PIF_VALUE: 0
PIF_VALUE: 13
PIF_VALUE: 27
PIF_VALUE: 23
PIF_VALUE: 18
PIF_VALUE: 0
PIF_VALUE: 21
PIF_VALUE: 18
PIF_VALUE: 18
PIF_VALUE: 25
PIF_VALUE: 28
PIF_VALUE: 25
PIF_VALUE: 20
PIF_VALUE: 18
PIF_VALUE: 16
PIF_VALUE: 18
PIF_VALUE: 17
PIF_VALUE: 0
PIF_VALUE: 22
PIF_VALUE: 18
PIF_VALUE: 0
PIF_VALUE: 23
PIF_VALUE: 17
PIF_VALUE: 0
PIF_VALUE: 27
PIF_VALUE: 17
PIF_VALUE: 34
PIF_VALUE: 18
PIF_VALUE: 0
PIF_VALUE: 18
PIF_VALUE: 0
PIF_VALUE: 20
PIF_VALUE: 24
PIF_VALUE: 25
PIF_VALUE: 21
PIF_VALUE: 0
PIF_VALUE: 22
PIF_VALUE: 17
PIF_VALUE: 0
PIF_VALUE: 27
PIF_VALUE: 48
PIF_VALUE: 20
PIF_VALUE: 29
PIF_VALUE: 0
PIF_VALUE: 0
PIF_VALUE: 29
PIF_VALUE: 29
PIF_VALUE: 0
PIF_VALUE: 18
PIF_VALUE: 0
PIF_VALUE: 0
PIF_VALUE: 25
PIF_VALUE: 37
PIF_VALUE: 15
PIF_VALUE: 18
PIF_VALUE: 19
PIF_VALUE: 24
PIF_VALUE: 34
PIF_VALUE: 25
PIF_VALUE: 19
PIF_VALUE: 17
PIF_VALUE: 0
PIF_VALUE: 21
PIF_VALUE: 24
PIF_VALUE: 15
PIF_VALUE: 19
PIF_VALUE: 16
PIF_VALUE: 0
PIF_VALUE: 0
PIF_VALUE: 22
PIF_VALUE: 0
PIF_VALUE: 21
PIF_VALUE: 17
PIF_VALUE: 18
PIF_VALUE: 32
PIF_VALUE: 0
PIF_VALUE: 0
PIF_VALUE: 17
PIF_VALUE: 18
PIF_VALUE: 0
PIF_VALUE: 27
PIF_VALUE: 23
PIF_VALUE: 17
PIF_VALUE: 0
PIF_VALUE: 17
PIF_VALUE: 0
PIF_VALUE: 0
PIF_VALUE: 45
PIF_VALUE: 20
PIF_VALUE: 21
PIF_VALUE: 17
PIF_VALUE: 28
PIF_VALUE: 22
PIF_VALUE: 0
PIF_VALUE: 24
PIF_VALUE: 0
PIF_VALUE: 27
PIF_VALUE: 0
PIF_VALUE: 21
PIF_VALUE: 19
PIF_VALUE: 0
PIF_VALUE: 0
PIF_VALUE: 21
PIF_VALUE: 27
PIF_VALUE: 18
PIF_VALUE: 17
PIF_VALUE: 15
PIF_VALUE: 17
PIF_VALUE: 29
PIF_VALUE: 24
PIF_VALUE: 0
PIF_VALUE: 0
PIF_VALUE: 20
PIF_VALUE: 25
PIF_VALUE: 24
PIF_VALUE: 21
PIF_VALUE: 23
PIF_VALUE: 33
PIF_VALUE: 20
PIF_VALUE: 22
PIF_VALUE: 20
PIF_VALUE: 27
PIF_VALUE: 20
PIF_VALUE: 0
PIF_VALUE: 17
PIF_VALUE: 37
PIF_VALUE: 0
PIF_VALUE: 18
PIF_VALUE: 15
PIF_VALUE: 25
PIF_VALUE: 21
PIF_VALUE: 18
PIF_VALUE: 24
PIF_VALUE: 0
PIF_VALUE: 16
PIF_VALUE: 20
PIF_VALUE: 18
PIF_VALUE: 18
PIF_VALUE: 28
PIF_VALUE: 21
PIF_VALUE: 29
PIF_VALUE: 26
PIF_VALUE: 0
PIF_VALUE: 19
PIF_VALUE: 0
PIF_VALUE: 18
PIF_VALUE: 17
PIF_VALUE: 0
PIF_VALUE: 19
PIF_VALUE: 26
PIF_VALUE: 18
PIF_VALUE: 27
PIF_VALUE: 0
PIF_VALUE: 0
PIF_VALUE: 17
PIF_VALUE: 30
PIF_VALUE: 0
PIF_VALUE: 19
PIF_VALUE: 19
PIF_VALUE: 18
PIF_VALUE: 0
PIF_VALUE: 18
PIF_VALUE: 19
PIF_VALUE: 31
PIF_VALUE: 20
PIF_VALUE: 40
PIF_VALUE: 25
PIF_VALUE: 21
PIF_VALUE: 20

## 2020-01-01 ASSESSMENT — PAIN SCALES - GENERAL
PAINLEVEL_OUTOF10: 0
PAINLEVEL_OUTOF10: 7
PAINLEVEL_OUTOF10: 0
PAINLEVEL_OUTOF10: 1
PAINLEVEL_OUTOF10: 0
PAINLEVEL_OUTOF10: 5
PAINLEVEL_OUTOF10: 0
PAINLEVEL_OUTOF10: 5
PAINLEVEL_OUTOF10: 0
PAINLEVEL_OUTOF10: 4
PAINLEVEL_OUTOF10: 0
PAINLEVEL_OUTOF10: 0
PAINLEVEL_OUTOF10: 5
PAINLEVEL_OUTOF10: 0
PAINLEVEL_OUTOF10: 0
PAINLEVEL_OUTOF10: 4
PAINLEVEL_OUTOF10: 0
PAINLEVEL_OUTOF10: 4
PAINLEVEL_OUTOF10: 0
PAINLEVEL_OUTOF10: 0

## 2020-01-01 ASSESSMENT — ENCOUNTER SYMPTOMS
SHORTNESS OF BREATH: 1
SHORTNESS OF BREATH: 1
DIARRHEA: 0
VOMITING: 0
NAUSEA: 0
SHORTNESS OF BREATH: 1
BACK PAIN: 0
EYE DISCHARGE: 0
WHEEZING: 0
SHORTNESS OF BREATH: 1
COUGH: 0
EYE PAIN: 0
SHORTNESS OF BREATH: 1
SINUS PRESSURE: 0
SHORTNESS OF BREATH: 0
EYE DISCHARGE: 0
EYE REDNESS: 0
ABDOMINAL PAIN: 0
ABDOMINAL PAIN: 0
SORE THROAT: 0
COLOR CHANGE: 0

## 2020-01-01 ASSESSMENT — PAIN - FUNCTIONAL ASSESSMENT: PAIN_FUNCTIONAL_ASSESSMENT: 0-10

## 2020-01-09 PROBLEM — I50.43 CHF (CONGESTIVE HEART FAILURE), NYHA CLASS I, ACUTE ON CHRONIC, COMBINED (HCC): Status: ACTIVE | Noted: 2020-01-01

## 2020-01-09 NOTE — PROGRESS NOTES
.Pharmacy Note    Santana Gurrola was ordered Alpha-lipoic acid. As per the [address] [place] [address], herbals and certain dietary supplements will be discontinued.   The herbal or dietary supplement may be continued after discharge from the hospital.

## 2020-01-09 NOTE — ED PROVIDER NOTES
HPI:  1/9/20,   Time: 2:09 PM       Serene Montoya is a 67 y.o. male presenting to the ED for sob/cp, beginning 1 week ago. The complaint has been intermittent, moderate in severity, and worsened by nothing. Hx chf, similar to prvs.  No cough, no n/v/d    Review of Systems:   Pertinent positives and negatives are stated within HPI, all other systems reviewed and are negative.          --------------------------------------------- PAST HISTORY ---------------------------------------------  Past Medical History:  has a past medical history of Blind, Cataracts, bilateral, CHF (congestive heart failure) ([facility] Diabetes mellitus ([facility] and Hypertension. Past Surgical History:  has a past surgical history that includes knee surgery; Carpal tunnel release; back surgery; TURP (11/22/2016); Eye surgery (05/2018); and Skin cancer excision (04/2019). Social History:  reports that he has never smoked. He has never used smokeless tobacco. He reports current alcohol use. He reports that he does not use drugs. Family History: family history includes Cancer in his sister; Stroke in his mother. The patients home medications have been reviewed. Allergies: Patient has no known allergies. ---------------------------------------------------PHYSICAL EXAM--------------------------------------    Constitutional/General: Alert and oriented x3, mild distress  Head: Normocephalic and atraumatic  Eyes: PERRL, EOMI, conjunctive normal, sclera non icteric  Mouth: Oropharynx clear, handling secretions, no trismus, no asymmetry of the posterior oropharynx or uvular edema  Neck: Supple, full ROM, non tender to palpation in the midline, no stridor, no crepitus, no meningeal signs  Respiratory: Lungs clear to auscultation bilaterally, no wheezes, rales, or rhonchi. Not in respiratory distress  Cardiovascular:  Regular rate. Regular rhythm. No murmurs, gallops, or rubs.  2+ distal pulses  Chest: No chest wall tenderness  GI: 01/09/20 1253 01/09/20 1418   BP:  85/67 (!) 91/58 104/64   Pulse:  105 94 93   Resp: 18 16 17 18   Temp:  97.8 °F (36.6 °C)     TempSrc:  Temporal     SpO2:  96% 97% 97%   Weight:  160 lb (72.6 kg)     Height:  5' 10\" (1.778 m)       Card/Pulm:  Rhythm: normal rate. Heart Sounds: no murmurs, gallops, or rubs. clear to auscultation, no wheezes or rales and unlabored breathing. Capillary Refill: normal.  Radial Pulse:  equal.  Skin:  Warm. Consultations:             Dr Veronica Graves:         Counseling: The emergency provider has spoken with the patient and spouse/SO and discussed todays results, in addition to providing specific details for the plan of care and counseling regarding the diagnosis and prognosis. Questions are answered at this time and they are agreeable with the plan.       --------------------------------- IMPRESSION AND DISPOSITION ---------------------------------    IMPRESSION  1. Systolic congestive heart failure, unspecified HF chronicity ([facility]        DISPOSITION  Disposition: Admit to telemetry  Patient condition is fair    NOTE: This report was transcribed using voice recognition software.  Every effort was made to ensure accuracy; however, inadvertent computerized transcription errors may be present        Marlon Garibay MD  01/09/20 1421

## 2020-01-10 NOTE — CARE COORDINATION
Met with pt and wife at bedside to discuss discharge / transition of care plan. Pt reports from home with spouse, independent of all ADL, denies DME or needs, spouse Jinny Schirmer will provide transportation home when medially stable for discharge. Discharge plan is home with no needs however, should pt require oxygen, choiced for BMS. The Plan for Transition of Care is related to the following treatment goals: medical stablility    The Patient and spouse Jinny Schirmer was provided with a choice of provider and agrees   with the discharge plan. [x] Yes [] No    Freedom of choice list was provided with basic dialogue that supports the patient's individualized plan of care/goals, treatment preferences and shares the quality data associated with the providers.  [x] Yes [] No

## 2020-01-10 NOTE — PROGRESS NOTES
Messaged Dr. Morrison Rang via perfect serve regarding patient's blood pressure of 91/57 and whether or not Entresto should be given.

## 2020-01-10 NOTE — CONSULTS
Entresto 24-26 mg twice daily and Aldactone 12.5 mg daily were resumed. EKG: AF, rate 102 bpm, LBBB. Please note: past medical records were reviewed per electronic medical record (EMR) - see detailed reports under Past Medical/ Surgical History. Past Medical History:    1. Hypertension   2. Diabetes mellitus -- insulin requiring   3. History of bilateral cataract surgery 4/2018  4. History of TURP (11/2016)  5. Bilateral carpal tunnel release   6. Lexiscan stress test: 10/17/2016 EF 27%, mild global hypokinesis, no evidence of exercised stress-induced ischemia  7. TTE: 6/23/2015-mild LVH, EF 60%, indeterminate diastolic function, aneurysmal intra-atrial septum, mild MR, trace TR, RVSP 29 mmHg. 8.  TTE: 10/15/2016-EF approximately 45%, E/E prime ratio is > 15 (25) dictating a marked elevation of left atrial pressure, stage II diastolic dysfunction, normal right ventricular size and systolic function, TA PSE is 21 mm consistent with normal global RV function, there is a possible PFO with left-to-right shunting, trace MI  9. Lexiscan MPS: 11/22/2017 imaging demonstrates a small perfusion abnormality of mild intensity located in the inferior wall. The defects is fixed. The study results are consistent with a nonischemic cardiomyopathy. EF 24%. 10. Chronic LBBB  11. New onset of AF with RVR (captured on EKG 1/9/2020, duration unknown). -[id number] score at least 5 (Age, HTN, DM, HF)        Past Surgical History:    Past Surgical History:   Procedure Laterality Date    BACK SURGERY      CARPAL TUNNEL RELEASE      EYE SURGERY  05/2018    KNEE SURGERY      SKIN CANCER EXCISION  04/2019    TURP  11/22/2016       Medications Prior to admit:  Prior to Admission medications    Medication Sig Start Date End Date Taking?  Authorizing Provider   furosemide (LASIX) 40 MG tablet Take 40 mg by mouth daily   Yes Historical Provider, MD   spironolactone (ALDACTONE) 25 MG tablet Take 12.5 mg by mouth daily   Yes Historical Provider, MD   amoxicillin-clavulanate (AUGMENTIN) 875-125 MG per tablet Take 1 tablet by mouth 2 times daily 1/6/20 1/13/20 Yes Historical Provider, MD   Alpha-Lipoic Acid 300 MG CAPS Take 1 capsule by mouth daily   Yes Historical Provider, MD   sacubitril-valsartan (ENTRESTO) 24-26 MG per tablet Take 1 tablet by mouth 2 times daily   Yes Historical Provider, MD   carvedilol (COREG) 3.125 MG tablet Take 1 tablet by mouth 2 times daily (with meals) 7/30/19  Yes Lata Barker MD   LEVEMIR FLEXTOUCH 100 UNIT/ML injection pen Inject 12 Units into the skin daily  5/12/18  Yes Historical Provider, MD   docusate sodium (COLACE) 100 MG capsule Take 100 mg by mouth daily    Yes Historical Provider, MD   sitagliptan-metformin (JANUMET)  MG per tablet Take 1 tablet by mouth 2 times daily (with meals). Yes Historical Provider, MD   aspirin 81 MG EC tablet Take 81 mg by mouth daily.      Yes Historical Provider, MD       Current Medications:    Current Facility-Administered Medications: amoxicillin-clavulanate (AUGMENTIN) 875-125 MG per tablet 1 tablet, 1 tablet, Oral, BID  aspirin EC tablet 81 mg, 81 mg, Oral, Daily  carvedilol (COREG) tablet 3.125 mg, 3.125 mg, Oral, BID WC  docusate sodium (COLACE) capsule 100 mg, 100 mg, Oral, Daily  sacubitril-valsartan (ENTRESTO) 24-26 MG per tablet 1 tablet, 1 tablet, Oral, BID  spironolactone (ALDACTONE) tablet 12.5 mg, 12.5 mg, Oral, Daily  furosemide (LASIX) injection 40 mg, 40 mg, Intravenous, Daily  insulin lispro (HUMALOG) injection vial 0-6 Units, 0-6 Units, Subcutaneous, TID WC  insulin lispro (HUMALOG) injection vial 0-3 Units, 0-3 Units, Subcutaneous, Nightly  glucose (GLUTOSE) 40 % oral gel 15 g, 15 g, Oral, PRN  dextrose 50 % IV solution, 12.5 g, Intravenous, PRN  glucagon (rDNA) injection 1 mg, 1 mg, Intramuscular, PRN  dextrose 5 % solution, 100 mL/hr, Intravenous, PRN  enoxaparin (LOVENOX) injection 40 mg, 40 mg, Subcutaneous, Daily  insulin glargine muscle use or intercostal retractions  RESPIRATORY: Lung sounds - Bibasilar rales. +On supplemental O2 (4L NC)  CARDIOVASCULAR:     Heart Inspection- shows no noted pulsations  Heart Palpation- no heaves or thrills; PMI is non-displaced   Heart Ausculation- IRR, 2/6 SUKI. No s3 or rub   PV: 1+ pitting lower extremity edema. No varicosities. Pedal pulses palpable, no clubbing or cyanosis   ABDOMEN: Soft, non-tender to light palpation. Bowel sounds present. No palpable masses no organomegaly; no abdominal bruit  MS: Good muscle strength and tone. No atrophy or abnormal movements. : Deferred  SKIN: Warm and dry no statis dermatitis or ulcers   NEURO / PSYCH: Oriented to person, place and time. Speech clear and appropriate. Follows all commands. Pleasant affect     DATA:    Tele strips: AF with intermittent RVR. Diagnostic:      Intake/Output Summary (Last 24 hours) at 1/10/2020 1227  Last data filed at 1/10/2020 1209  Gross per 24 hour   Intake 780 ml   Output 1050 ml   Net -270 ml       Labs:   CBC:   Recent Labs     01/09/20  1258 01/10/20  0642   WBC 8.0 7.7   HGB 12.9 11.4*   HCT 39.7 35.4*    135     BMP:   Recent Labs     01/09/20  1258 01/10/20  0642    142   K 4.7 4.5   CO2 23 23   BUN 31* 30*   CREATININE 1.2 1.2   LABGLOM 59 59   CALCIUM 10.3* 9.6     Mag:   Recent Labs     01/09/20  1258   MG 1.7     Phos: No results for input(s): PHOS in the last 72 hours. TSH: No results for input(s): TSH in the last 72 hours. HgA1c: No results found for: LABA1C  No results found for: EAG  proBNP:   Recent Labs     01/09/20  1258   PROBNP 5,373*     PT/INR: No results for input(s): PROTIME, INR in the last 72 hours. APTT:No results for input(s): APTT in the last 72 hours.   CARDIAC ENZYMES:  Recent Labs     01/09/20  1258   TROPONINI 0.03     LIVER PROFILE:  Recent Labs     01/09/20  1258   AST 21   ALT 17   LABALBU 4.0     CXR: 1/9/2020     Probable mild or early cardiogenic pulmonary edema.     Assessment/Plan as per Dr. Elizabeth Jon  Electronically signed by PAULINO De Paz CNP on 1/10/20 at 1:13 PM

## 2020-01-10 NOTE — H&P
Alea Ogden is an 67 y.o.  male. Patient says he became more short of breath over the past few days. He denies any chest pain, fevers, or chills.     Past Medical History:   Diagnosis Date    Blind     Cataracts, bilateral 04/2018    right eye    [facility]congest[facility]) ([facility]     Diabetes mellitus ([facility]     Hypertension      Past Surgical History:   Procedure Laterality Date    BACK SURGERY      CARPAL TUNNEL RELEASE      EYE SURGERY  05/2018    KNEE SURGERY      SKIN CANCER EXCISION  04/2019    TURP  11/22/2016       Family History   Problem Relation Age of Onset    Stroke Mother     Cancer Sister      Social History     Tobacco Use    Smoking status: Never Smoker    Smokeless tobacco: Never Used   Substance Use Topics    Alcohol use: Yes     Comment: occassionally    Drug use: No       Current Facility-Administered Medications   Medication Dose Route Frequency Provider Last Rate Last Dose    amoxicillin-clavulanate (AUGMENTIN) 875-125 MG per tablet 1 tablet  1 tablet Oral BID Phuc Gutierrez MD   1 tablet at 01/09/20 1951    aspirin EC tablet 81 mg  81 mg Oral Daily Phuc Gutierrez MD        carvedilol (COREG) tablet 3.125 mg  3.125 mg Oral BID WC Phuc Gutierrez MD   3.125 mg at 01/09/20 1718    docusate sodium (COLACE) capsule 100 mg  100 mg Oral Daily Phuc Gutierrez MD        sacubitril-valsartan (ENTRESTO) 24-26 MG per tablet 1 tablet  1 tablet Oral BID Phuc Gutierrez MD   1 tablet at 01/09/20 2119    spironolactone (ALDACTONE) tablet 12.5 mg  12.5 mg Oral Daily Phuc Gutierrez MD        furosemide (LASIX) injection 40 mg  40 mg Intravenous Daily Phuc Gutierrez MD        insulin lispro (HUMALOG) injection vial 0-6 Units  0-6 Units Subcutaneous TID [facility] Phuc Gutierrez MD   1 Units at 01/10/20 0077    insulin lispro (HUMALOG) injection vial 0-3 Units  0-3 Units Subcutaneous Nightly Phuc Gutierrez MD   1 Units at 01/09/20 1944    glucose (GLUTOSE) 40 % oral gel 15 g  15 g Oral PRN Gavin Bhat MD        dextrose 50 % IV solution  12.5 g Intravenous PRN Gavin Bhat MD        glucagon (rDNA) injection 1 mg  1 mg Intramuscular PRN Gavin Bhat MD        dextrose 5 % solution  100 mL/hr Intravenous PRN Gavin Bhat MD        enoxaparin (LOVENOX) injection 40 mg  40 mg Subcutaneous Daily Gavin Bhat MD        insulin glargine (LANTUS) injection vial 12 Units  12 Units Subcutaneous Nightly Gavin Bhat MD   12 Units at 01/09/20 1945    metFORMIN (GLUCOPHAGE) tablet 1,000 mg  1,000 mg Oral BID WC Gavin Bhat MD   1,000 mg at 01/09/20 1718    And    linagliptin (TRADJENTA) tablet 5 mg  5 mg Oral Daily Gavin Bhat MD   5 mg at 01/09/20 1718     Allergies: No Known Allergies    Active Problems:    CHF (congestive heart failure), NYHA class I, acute on chronic, combined ([facility]  Resolved Problems:    * No resolved hospital problems. *    Blood pressure 90/60, pulse 97, temperature 96.8 °F (36 °C), temperature source Temporal, resp. rate 18, height 5' 10\" (1.778 m), weight 160 lb (72.6 kg), SpO2 94 %. Review of Systems   Constitutional: Positive for activity change. HENT: Negative for congestion. Eyes: Negative for discharge. Respiratory: Positive for shortness of breath. Cardiovascular: Positive for leg swelling. Negative for chest pain. Gastrointestinal: Negative for abdominal pain. Endocrine: Negative for cold intolerance. Genitourinary: Negative for difficulty urinating. Musculoskeletal: Negative for arthralgias. Skin: Negative for color change. Neurological: Negative for dizziness. Hematological: Negative for adenopathy. Psychiatric/Behavioral: Negative for agitation. Physical Exam  Constitutional:       Appearance: Normal appearance. HENT:      Head: Normocephalic.       Nose: Nose normal.

## 2020-01-11 NOTE — PROGRESS NOTES
Blayne Manzo is a 67 y.o. male patient. Patient says his breathing is fair today.     Current Facility-Administered Medications   Medication Dose Route Frequency Provider Last Rate Last Dose    perflutren lipid microspheres (DEFINITY) injection 1.65 mg  1.5 mL Intravenous ONCE PRN Trudee Fleischer, APRN - CNP        enoxaparin (LOVENOX) injection 70 mg  1 mg/kg Subcutaneous BID Trudee Fleischer, APRN - CNP   70 mg at 01/10/20 2140    furosemide (LASIX) injection 40 mg  40 mg Intravenous BID Trudee Fleischer, APRN - CNP   40 mg at 01/10/20 1715    digoxin (LANOXIN) injection 250 mcg  250 mcg Intravenous Once Trudee Fleischer, APRN - CNP   Stopped at 01/11/20 0537    digoxin (LANOXIN) tablet 125 mcg  125 mcg Oral Daily Trudee Fleischer, APRN - CNP        amoxicillin-clavulanate (AUGMENTIN) 875-125 MG per tablet 1 tablet  1 tablet Oral BID Apolinar Boss MD   1 tablet at 01/10/20 2141    aspirin EC tablet 81 mg  81 mg Oral Daily Apolinar Boss MD   81 mg at 01/10/20 0912    carvedilol (COREG) tablet 3.125 mg  3.125 mg Oral BID WC Apolinar Boss MD   3.125 mg at 01/10/20 1713    docusate sodium (COLACE) capsule 100 mg  100 mg Oral Daily Apolinar Boss MD        sacubitril-valsartan (ENTRESTO) 24-26 MG per tablet 1 tablet  1 tablet Oral BID Apolinar Boss MD   1 tablet at 01/10/20 2141    spironolactone (ALDACTONE) tablet 12.5 mg  12.5 mg Oral Daily Apolinar Boss MD   12.5 mg at 01/10/20 0911    insulin lispro (HUMALOG) injection vial 0-6 Units  0-6 Units Subcutaneous TID [facility] Apolinar Boss MD   Stopped at 01/11/20 0423    insulin lispro (HUMALOG) injection vial 0-3 Units  0-3 Units Subcutaneous Nightly Apolinar Boss MD   1 Units at 01/09/20 1944    glucose (GLUTOSE) 40 % oral gel 15 g  15 g Oral PRN Apolinar Boss MD        dextrose 50 % IV solution  12.5 g Intravenous PRN Apolinar Boss MD        glucagon (rDNA) injection 1 mg  1 mg Intramuscular PRN Phuc Gutierrez MD        dextrose 5 % solution  100 mL/hr Intravenous PRN Phuc Gutierrez MD        insulin glargine (LANTUS) injection vial 12 Units  12 Units Subcutaneous Nightly Phuc Gutierrez MD   Stopped at 01/10/20 2139    metFORMIN (GLUCOPHAGE) tablet 1,000 mg  1,000 mg Oral BID WC Phuc Gutierrez MD   1,000 mg at 01/10/20 1713    And    linagliptin (TRADJENTA) tablet 5 mg  5 mg Oral Daily Phuc Gutierrez MD   5 mg at 01/10/20 2885     No Known Allergies  Active Problems:    CHF (congestive heart failure), NYHA class I, acute on chronic, combined ([address]    Systolic congestive heart failure ([facility]  Resolved Problems:    * No resolved hospital problems. *    Blood pressure (!) 89/55, pulse 80, temperature 97.8 °F (36.6 °C), temperature source Temporal, resp. rate 20, height 5' 10\" (1.778 m), weight 164 lb 12.8 oz (74.8 kg), SpO2 97 %. Subjective:  Symptoms:  Improved. He reports shortness of breath. Diet:  Adequate intake. Activity level: Impaired due to weakness. Pain:  He reports no pain. Objective:  General Appearance:  Comfortable. Vital signs: (most recent): Blood pressure (!) 89/55, pulse 80, temperature 97.8 °F (36.6 °C), temperature source Temporal, resp. rate 20, height 5' 10\" (1.778 m), weight 164 lb 12.8 oz (74.8 kg), SpO2 97 %. No fever. Lungs:  Normal effort and normal respiratory rate. Breath sounds clear to auscultation. Heart: Normal rate. Regular rhythm. S1 normal and S2 normal.      Assessment:  (Acute systolic CHF  DM  HTN     Plan:  IV lasix. Cardiology following. Monitor output and labs. Continue rest of meds. ).        Phuc Gutierrez MD  1/11/2020

## 2020-01-11 NOTE — PLAN OF CARE
Problem: Falls - Risk of:  Goal: Will remain free from falls  Description  Will remain free from falls  1/11/2020 0449 by Liu Sharp RN  Outcome: Met This Shift  1/10/2020 1842 by Kevin Wilkins RN  Outcome: Met This Shift  Goal: Absence of physical injury  Description  Absence of physical injury  1/11/2020 0449 by Liu Sharp RN  Outcome: Met This Shift  1/10/2020 1842 by Kevin Wilkins RN  Outcome: Met This Shift

## 2020-01-12 NOTE — PLAN OF CARE
Problem: Falls - Risk of:  Goal: Will remain free from falls  Description  Will remain free from falls  Outcome: Met This Shift     Problem: Falls - Risk of:  Goal: Absence of physical injury  Description  Absence of physical injury  Outcome: Met This Shift     Problem: OXYGENATION/RESPIRATORY FUNCTION  Goal: Patient will maintain patent airway  1/12/2020 0258 by Jesus Lucio RN  Outcome: Met This Shift  1/11/2020 1513 by Osvaldo Sharma RN  Outcome: Met This Shift

## 2020-01-12 NOTE — PROGRESS NOTES
Patient blood sugar this morning was 64. Given orange juice and leigh ann crackers. Will recheck sugar.

## 2020-01-12 NOTE — PROGRESS NOTES
Airam Souza MD        glucagon (rDNA) injection 1 mg  1 mg Intramuscular PRN Gavin Bhat MD        dextrose 5 % solution  100 mL/hr Intravenous PRN Gvain Bhat MD        insulin glargine (LANTUS) injection vial 12 Units  12 Units Subcutaneous Nightly Gavin Bhat MD   12 Units at 01/11/20 2053    metFORMIN (GLUCOPHAGE) tablet 1,000 mg  1,000 mg Oral BID WC Gavin Bhat MD   1,000 mg at 01/11/20 1634    And    linagliptin (TRADJENTA) tablet 5 mg  5 mg Oral Daily Gavin Bhat MD   5 mg at 01/11/20 0849     No Known Allergies  Active Problems:    CHF (congestive heart failure), NYHA class I, acute on chronic, combined ([address]    Systolic congestive heart failure ([address]  Resolved Problems:    * No resolved hospital problems. *    Blood pressure (!) 102/57, pulse 81, temperature 97 °F (36.1 °C), temperature source Temporal, resp. rate 17, height 5' 10\" (1.778 m), weight 164 lb (74.4 kg), SpO2 97 %. Subjective:  Symptoms:  Improved. He reports shortness of breath. Diet:  Adequate intake. Activity level: Impaired due to weakness. Pain:  He reports no pain. Objective:  General Appearance:  Comfortable. Vital signs: (most recent): Blood pressure (!) 102/57, pulse 81, temperature 97 °F (36.1 °C), temperature source Temporal, resp. rate 17, height 5' 10\" (1.778 m), weight 164 lb (74.4 kg), SpO2 97 %. No fever. Lungs:  Normal effort and normal respiratory rate. Breath sounds clear to auscultation. Heart: Normal rate. Regular rhythm. S1 normal and S2 normal.      Assessment:  (Acute systolic CHF  DM  HTN     Plan:  IV lasix. Cardiology following. Monitor output and labs. Continue rest of meds. ).        Gavin Bhat MD  1/12/2020

## 2020-01-13 NOTE — PROGRESS NOTES
Marla Lopez is a 67 y.o. male patient. Patient says his breathing is fair today.     Current Facility-Administered Medications   Medication Dose Route Frequency Provider Last Rate Last Dose    perflutren lipid microspheres (DEFINITY) injection 1.65 mg  1.5 mL Intravenous ONCE PRN PAULINO Bravo CNP        enoxaparin (LOVENOX) injection 70 mg  1 mg/kg Subcutaneous BID PAULINO Bravo CNP   70 mg at 01/12/20 2104    furosemide (LASIX) injection 40 mg  40 mg Intravenous BID PAULINO Bravo CNP   40 mg at 01/12/20 1708    digoxin (LANOXIN) injection 250 mcg  250 mcg Intravenous Once PAULINO Bravo CNP   Stopped at 01/11/20 0537    digoxin (LANOXIN) tablet 125 mcg  125 mcg Oral Daily PAULINO Bravo CNP   125 mcg at 01/12/20 0920    amoxicillin-clavulanate (AUGMENTIN) 875-125 MG per tablet 1 tablet  1 tablet Oral BID Lobo Tam MD   1 tablet at 01/12/20 2104    aspirin EC tablet 81 mg  81 mg Oral Daily Lobo Tam MD   81 mg at 01/12/20 4758    carvedilol (COREG) tablet 3.125 mg  3.125 mg Oral BID WC Lobo Tam MD   3.125 mg at 01/12/20 1167    docusate sodium (COLACE) capsule 100 mg  100 mg Oral Daily Lobo Tam MD   100 mg at 01/11/20 9632    sacubitril-valsartan (ENTRESTO) 24-26 MG per tablet 1 tablet  1 tablet Oral BID Lobo Tam MD   1 tablet at 01/12/20 2104    spironolactone (ALDACTONE) tablet 12.5 mg  12.5 mg Oral Daily Lobo Tam MD   12.5 mg at 01/12/20 0920    insulin lispro (HUMALOG) injection vial 0-6 Units  0-6 Units Subcutaneous TID [facility] Lobo Tam MD   1 Units at 01/12/20 1708    insulin lispro (HUMALOG) injection vial 0-3 Units  0-3 Units Subcutaneous Nightly Lobo Tam MD   1 Units at 01/09/20 1944    glucose (GLUTOSE) 40 % oral gel 15 g  15 g Oral PRN Lobo Tam MD   15 g at 01/13/20 0613    dextrose 50 % IV solution  12.5 g Intravenous PRN Valarie Suarez MD        glucagon (rDNA) injection 1 mg  1 mg Intramuscular PRN Valarie Suarez MD        dextrose 5 % solution  100 mL/hr Intravenous PRN Valarie Suarez MD        insulin glargine (LANTUS) injection vial 12 Units  12 Units Subcutaneous Nightly Valarie Suarez MD   12 Units at 01/12/20 2122    metFORMIN (GLUCOPHAGE) tablet 1,000 mg  1,000 mg Oral BID WC Valarie Suarez MD   1,000 mg at 01/12/20 1707    And    linagliptin (TRADJENTA) tablet 5 mg  5 mg Oral Daily Valarie Suarez MD   5 mg at 01/12/20 1461     No Known Allergies  Active Problems:    CHF (congestive heart failure), NYHA class I, acute on chronic, combined ([address]    Systolic congestive heart failure ([facility] [address]  Resolved Problems:    * No resolved hospital problems. *    Blood pressure (!) 84/52, pulse 72, temperature 97.9 °F (36.6 °C), temperature source Temporal, resp. rate 16, height 5' 10\" (1.778 m), weight 157 lb 4.8 oz (71.4 kg), SpO2 96 %. Subjective:  Symptoms:  Improved. He reports shortness of breath. Diet:  Adequate intake. Activity level: Impaired due to weakness. Pain:  He reports no pain. Objective:  General Appearance:  Comfortable. Vital signs: (most recent): Blood pressure (!) 84/52, pulse 72, temperature 97.9 °F (36.6 °C), temperature source Temporal, resp. rate 16, height 5' 10\" (1.778 m), weight 157 lb 4.8 oz (71.4 kg), SpO2 96 %. No fever. Lungs:  Normal effort and normal respiratory rate. Breath sounds clear to auscultation. Heart: Normal rate. Regular rhythm. S1 normal and S2 normal.      Assessment:  (Acute systolic CHF  DM  HTN     Plan:  IV lasix. Cardiology following. Monitor output and labs. Continue rest of meds. ).        Valarie Suarez MD  1/13/2020

## 2020-01-13 NOTE — PROGRESS NOTES
INPATIENT CARDIOLOGY FOLLOW-UP    Name: Blayne Manzo    Age: 67 y.o. Date of Admission: 1/9/2020 12:30 PM    Date of Service: 1/13/2020    Chief Complaint: Follow-up for acute superimposed upon chronic combined systolic and diastolic heart failure, nonischemic cardiomyopathy, paroxysmal atrial fibrillation    Interim History: The patient and family relate improvement of his functional capabilities with present fluid mobilization. Acceptable rate control of his atrial fibrillation has been achieved. Most recent echocardiogram demonstrates severe left ventricular systolic dysfunction face of severe concentric left ventricular hypertrophy with moderate left atrial enlargement moderate mitral regurgitation. Review of Systems: The remainder of a complete multisystem review including consitutional, central nervous, respiratory, circulatory, gastrointestinal, genitourinary, endocrinologic, hematologic, musculoskeletal and psychiatric are negative.     Problem List:  Patient Active Problem List   Diagnosis    Blind    PFO (patent foramen ovale)    Cellulitis of right hand    Diabetes mellitus type 2, uncontrolled ([address]    Essential hypertension    Hyperlipidemia LDL goal <100    Acute pulmonary edema (HCC)    Chronic systolic CHF (congestive heart failure) (HCC)    NSVT (nonsustained ventricular tachycardia) (HCC)    LV dysfunction    Encounter for medication monitoring    SOB (shortness of breath)    CHF (congestive heart failure), NYHA class I, acute on chronic, combined ([address]    Systolic congestive heart failure (HCC)       Allergies:  No Known Allergies    Current Medications:  Current Facility-Administered Medications   Medication Dose Route Frequency Provider Last Rate Last Dose    perflutren lipid microspheres (DEFINITY) injection 1.65 mg  1.5 mL Intravenous ONCE PRN Trudee Fleischer, APRN - CNP        enoxaparin (LOVENOX) injection 70 mg  1 mg/kg Subcutaneous BID Trudee Fleischer, PAULINO Shipley CNP   70 mg at 01/13/20 0848    furosemide (LASIX) injection 40 mg  40 mg Intravenous BID Bj GonzalezPAULINO CNP   40 mg at 01/13/20 0847    digoxin (LANOXIN) injection 250 mcg  250 mcg Intravenous Once Bj Gonzalez PAULINO Shipley CNP   Stopped at 01/11/20 0537    digoxin (LANOXIN) tablet 125 mcg  125 mcg Oral Daily Bj Gonzalez, APRN - CNP   125 mcg at 01/13/20 0848    aspirin EC tablet 81 mg  81 mg Oral Daily Tessa Zee MD   81 mg at 01/13/20 0848    carvedilol (COREG) tablet 3.125 mg  3.125 mg Oral BID  Tessa Zee MD   3.125 mg at 01/13/20 0848    docusate sodium (COLACE) capsule 100 mg  100 mg Oral Daily Tessa Zee MD   100 mg at 01/13/20 0848    sacubitril-valsartan (ENTRESTO) 24-26 MG per tablet 1 tablet  1 tablet Oral BID Tessa Zee MD   1 tablet at 01/13/20 1020    spironolactone (ALDACTONE) tablet 12.5 mg  12.5 mg Oral Daily Tessa Zee MD   12.5 mg at 01/13/20 1020    insulin lispro (HUMALOG) injection vial 0-6 Units  0-6 Units Subcutaneous TID GUNNAR Zee MD   1 Units at 01/12/20 1708    insulin lispro (HUMALOG) injection vial 0-3 Units  0-3 Units Subcutaneous Nightly Tessa Zee MD   1 Units at 01/09/20 1944    glucose (GLUTOSE) 40 % oral gel 15 g  15 g Oral PRN Tessa Zee MD   15 g at 01/13/20 7303    dextrose 50 % IV solution  12.5 g Intravenous PRN Tessa Zee MD        glucagon (rDNA) injection 1 mg  1 mg Intramuscular PRN Tessa Zee MD        dextrose 5 % solution  100 mL/hr Intravenous PRN Tessa Zee MD        insulin glargine (LANTUS) injection vial 12 Units  12 Units Subcutaneous Nightly Tessa Zee MD   12 Units at 01/12/20 2122    metFORMIN (GLUCOPHAGE) tablet 1,000 mg  1,000 mg Oral BID WC Tessa Zee MD   1,000 mg at 01/13/20 0848    And    linagliptin (TRADJENTA) tablet 5 mg  5 mg Oral Daily Doug Estrella input(s): ALKPHOS, ALT, AST, PROT, BILITOT, BILIDIR, LABALBU in the last 72 hours. Lab Results   Component Value Date    MG 1.5 01/11/2020     No results found for: PROTIME, INR  Lab Results   Component Value Date    TSH 2.890 10/15/2016     No components found for: CHLPL  Lab Results   Component Value Date    TRIG 74 01/11/2020     Lab Results   Component Value Date    HDL 35 01/11/2020     Lab Results   Component Value Date    LDLCALC 41 01/11/2020       Cardiac Tests:  Telemetry findings reviewed: atrial fibrillation with a mean ventricular response of approximately 80 bpm, no new tachy/bradyarrhythmias overnight  Last Echocardiogram: An echocardiogram demonstrated evidence of a normal size left ventricular chamber with severe concentric left ventricular perjury and severe left ventricular systolic dysfunction with an estimated ejection fraction of 30% with right ventricular dilatation and right ventricular dysfunction. Moderate left atrial enlargement is present with moderate mitral regurgitation      ASSESSMENT / PLAN: On a clinical basis, the patient presently appears symptomatically improved in the face of his nonischemic cardiomyopathy and severe left ventricular systolic dysfunction in part decompensated by the development of atrial fibrillation. Presently, acceptable rate control has been achieved with plans of conversion of his beta-blocker to that of metoprolol succinate to achieve acceptable rate control without the hypotensive effects in addition to that of his existing medical regimen. The conversion of his heparin to that of oral anticoagulation is advisable for embolic protection in view of his atrial fibrillation and based on his present compensation, a short-term rate control and anticoagulation strategy will be maintained as opposed to that of a transesophageal echocardiographic guided cardioversion.   Based on the absence of significant atherosclerotic vascular disease, low-dose aspirin will

## 2020-01-15 NOTE — TELEPHONE ENCOUNTER
Pt called to request samples of Xarelto 20mg daily. Please let him know if he can pick some up.   He has an appt on the 01-23-20 but will need meds before that. [phone]

## 2020-01-15 NOTE — CARE COORDINATION
Verified on 1/15/20 that patient has a non-qualifying BPCI-A DRG of [phone] for hospital encounter admission date 1/9/20

## 2020-01-17 NOTE — DISCHARGE SUMMARY
100 mg by mouth daily Historical Med      sitagliptan-metformin (JANUMET)  MG per tablet Take 1 tablet by mouth 2 times daily (with meals).   Historical Med         STOP taking these medications       digoxin (LANOXIN) 0.25 MG/ML injection Comments:   Reason for Stopping:         amoxicillin-clavulanate (AUGMENTIN) 875-125 MG per tablet Comments:   Reason for Stopping:         carvedilol (COREG) 3.125 MG tablet Comments:   Reason for Stopping:         aspirin 81 MG EC tablet Comments:   Reason for Stopping:             Activity: activity as tolerated  Diet: diabetic diet  Wound Care: none needed    Follow-up with PCP and Cardiology in 1-2 weeks    Signed:  Elbert Fernandez MD  1/17/2020  7:43 AM

## 2020-01-23 NOTE — PROGRESS NOTES
Patient Active Problem List   Diagnosis    Blind    PFO (patent foramen ovale)    Cellulitis of right hand    Diabetes mellitus type 2, uncontrolled ([facility] [address]    Essential hypertension    Hyperlipidemia LDL goal <100    Acute pulmonary edema (HCC)    Chronic systolic CHF (congestive heart failure) (HCC)    NSVT (nonsustained ventricular tachycardia) (HCC)    LV dysfunction    Encounter for medication monitoring    SOB (shortness of breath)    CHF (congestive heart failure), NYHA class I, acute [facility] chronic, [facility]    Systolic congestive heart failure (HCC)       Current Outpatient Medications   Medication Sig Dispense Refill    DIGOXIN PO Take 0.25 mg by mouth daily      atorvastatin (LIPITOR) 40 MG tablet Take 40 mg by mouth daily      rivaroxaban (XARELTO) 20 MG TABS tablet Take 1 tablet by mouth daily 30 tablet 2    metoprolol succinate (TOPROL XL) 25 MG extended release tablet Take 0.5 tablets by mouth 2 times daily 30 tablet 3    furosemide (LASIX) 40 MG tablet Take 40 mg by mouth daily      SPIRONOLACTONE PO Take 12.5 mg by mouth daily       sacubitril-valsartan (ENTRESTO) 24-26 MG per tablet Take 1 tablet by mouth 2 times daily      LEVEMIR FLEXTOUCH 100 UNIT/ML injection pen Inject 12 Units into the skin daily   12    docusate sodium (COLACE) 100 MG capsule Take 100 mg by mouth daily       sitagliptan-metformin (JANUMET)  MG per tablet Take 1 tablet by mouth 2 times daily (with meals). No current facility-administered medications for this visit. CC:    Patient is seen for new problem of a fib and in follow up for:  1. Chronic systolic CHF (congestive heart failure) ([facility]    2. Essential hypertension    3. Leg edema    4. NSVT (nonsustained ventricular tachycardia) ([facility]    5. Hyperlipidemia LDL goal <100    6. NICM (nonischemic cardiomyopathy) ([facility]    7. LV dysfunction    8. Atrial fibrillation, unspecified type ([facility]    9.  Abnormal findings on diagnostic imaging of heart and coronary circulation         HPI:  Neno Mejía is seen in follow-up evaluation new onset atrial fibrillation. He remains weaker and short of breath more since he has been in A. fib. Bosie Sprinkle No chest pain. No lightheadedness. ROS:   General: No unusual weight gain, change in exercise tolerance  EENT: No vision changes or nosebleeds  Cardiovascular: No orthopnea or paroxysmal nocturnal dyspnea  Respiratory: No cough or hemoptysis  Gastrointestinal: No hematemesis or recent changes in bowel habits  Neurologic: No difficulties with speech or unilateral weakness.   Allergic / Immunologic/ Lymphatic / Endocrine: No anemia or bleeding tendency       Social History     Socioeconomic History    Marital status:      Spouse name: Not on file    Number of children: Not on file    Years of education: Not on file    Highest education level: Not on file   Occupational History    Not on file   Social Needs    Financial resource strain: Not on file    Food insecurity:     Worry: Not on file     Inability: Not on file    Transportation needs:     Medical: Not on file     Non-medical: Not on file   Tobacco Use    Smoking status: Never Smoker    Smokeless tobacco: Never Used   Substance and Sexual Activity    Alcohol use: Yes     Comment: occassionally    Drug use: No    Sexual activity: Not on file   Lifestyle    Physical activity:     Days per week: Not on file     Minutes per session: Not on file    Stress: Not on file   Relationships    Social connections:     Talks on phone: Not on file     Gets together: Not on file     Attends [employer] service: Not on file     Active member of club or organization: Not on file     Attends meetings of clubs or organizations: Not on file     Relationship status: Not on file    Intimate partner violence:     Fear of current or ex partner: Not on file     Emotionally abused: Not on file     Physically abused: Not on file     Forced sexual activity: Not on file orders. Discussed risk benefits of TISH/cardioversion  Discussed issues that would prompt earlier evaluation.          Follow-up office visit in 2 mos./pending above

## 2020-01-30 NOTE — ED PROVIDER NOTES
ED Attending  CC: No  HPI:  1/30/20,   Time: 1:53 PM       Annita Pereira is a 67 y.o. male presenting to the ED for hematuria. He states he was recently discharged on 1/14/2020 and put on Xarelto. He states he got up to use the bathroom this morning and noticed bright red blood in his urine. He denies chest pain, shortness of breath, or abdominal pain. He denies nausea or vomiting. He denies dysuria or urinary frequency. The complaint has been intermittent, moderate in severity, and worsened by nothing. Review of Systems:   Pertinent positives and negatives are stated within HPI, all other systems reviewed and are negative.          --------------------------------------------- PAST HISTORY ---------------------------------------------  Past Medical History:  has a past medical history of Blind, Cataracts, bilateral, CHF (congestive heart failure) ([facility] [address] Diabetes [facility] ([facility] and Hypertension. Past Surgical History:  has a past surgical history that includes knee surgery; Carpal tunnel release; back surgery; TURP (11/22/2016); Eye surgery (05/2018); and Skin cancer excision (04/2019). Social History:  reports that he has never smoked. He has never used smokeless tobacco. He reports current alcohol use. He reports that he does not use drugs. Family History: family history includes Cancer in his sister; Stroke in his mother. The patients home medications have been reviewed. Allergies: Patient has no known allergies.         ---------------------------------------------------PHYSICAL EXAM--------------------------------------    Constitutional/General: Alert and oriented x3, well appearing, non toxic in NAD  Head: Normocephalic and atraumatic  Eyes: PERRL, EOMI, conjunctive normal, sclera non icteric  Mouth: Oropharynx clear, handling secretions, no trismus, no asymmetry of the posterior oropharynx or uvular edema  Neck: Supple, full ROM, non tender to palpation in the midline, no stridor, examined by myself and Dr. [facility]. Labs and urine have been ordered. 1 L IV fluids have been ordered for low blood pressure. Patient states his blood pressure normally runs 90s over 60s. He denies dizziness or lightheadedness at this time. Medical Decision Making:         Results have been reviewed and discussed with patient and wife. The patient is instructed to continue his Xarelto. He will be discharged with instructions to monitor for signs of increased bleeding including blood in his stools, increased blood in his urine, or increased bruising. He is instructed to follow-up with his PCP and to return to emergency as needed. And wife are agreeable to discharge plan at this time. This patient's ED course included: a personal history and physicial examination, re-evaluation prior to disposition and multiple bedside re-evaluations    This patient has remained hemodynamically stable and improved during their ED course. Re-Evaluations:             Re-evaluation. Patients symptoms are improving    Re-examination  1/30/20   1:53 PM          Vital Signs:   Vitals:    01/30/20 1128 01/30/20 1130 01/30/20 1428 01/30/20 1503   BP: 134/78 (!) 94/52 99/61 (!) 97/52   Pulse:   61 59   Resp: 18  18 18   Temp: 98.6 °F (37 °C)      SpO2:   97% 99%   Weight: 162 lb (73.5 kg)        Card/Pulm:  Rhythm: normal rate. Heart Sounds: Normal S1, S2 and no murmurs, gallops, or rubs. clear to auscultation, no wheezes or rales and unlabored breathing. Capillary Refill: normal.  Radial Pulse:  present 2+ and equal.  Skin:  Dry and Warm. Consultations:             none    Critical Care: none        Counseling: The emergency provider has spoken with the patient and spouse/SO and discussed todays results, in addition to providing specific details for the plan of care and counseling regarding the diagnosis and prognosis. Questions are answered at this time and they are agreeable with the plan.

## 2020-01-30 NOTE — TELEPHONE ENCOUNTER
Patient called in this morning to let us know he is urinating blood. He was sent to the ER for evaluation.

## 2020-02-01 PROBLEM — R31.9 HEMATURIA: Status: ACTIVE | Noted: 2020-01-01

## 2020-02-01 PROBLEM — J18.9 PNEUMONIA DUE TO INFECTIOUS ORGANISM: Status: ACTIVE | Noted: 2020-01-01

## 2020-02-01 PROBLEM — Z51.81 ENCOUNTER FOR MEDICATION MONITORING: Status: RESOLVED | Noted: 2018-06-20 | Resolved: 2020-01-01

## 2020-02-01 PROBLEM — I51.9 LV DYSFUNCTION: Status: RESOLVED | Noted: 2017-12-01 | Resolved: 2020-01-01

## 2020-02-01 PROBLEM — I50.43 CHF (CONGESTIVE HEART FAILURE), NYHA CLASS I, ACUTE ON CHRONIC, COMBINED (HCC): Status: RESOLVED | Noted: 2020-01-01 | Resolved: 2020-01-01

## 2020-02-01 PROBLEM — R31.0 GROSS HEMATURIA: Status: ACTIVE | Noted: 2020-01-01

## 2020-02-01 PROBLEM — I50.23 SYSTOLIC CHF, ACUTE ON CHRONIC (HCC): Chronic | Status: ACTIVE | Noted: 2020-01-01

## 2020-02-01 NOTE — ED PROVIDER NOTES
Breath sounds: Normal breath sounds. No wheezing or rales. Abdominal:      General: Bowel sounds are normal.      Palpations: Abdomen is soft. Tenderness: There is no abdominal tenderness. There is no guarding or rebound. Musculoskeletal:         General: No tenderness or deformity. Skin:     General: Skin is warm and dry. Neurological:      Mental Status: He is alert and oriented to person, place, and time. Cranial Nerves: No cranial nerve deficit. Coordination: Coordination normal.          Procedures     MDM       --------------------------------------------- PAST HISTORY ---------------------------------------------  Past Medical History:  has a past medical history of Atrial fibrillation ([facility] [address] Diabetes mellitus ([facility] Hypertension, and Legal blindness. Past Surgical History:  has a past surgical history that includes knee surgery; Carpal tunnel release; back surgery; TURP (11/22/2016); Eye surgery (05/2018); and Skin cancer excision (04/2019). Social History:  reports that he has never smoked. He has never used smokeless tobacco. He reports current alcohol use. He reports that he does not use drugs. Family History: family history includes Cancer in his sister; Stroke in his mother. The patients home medications have been reviewed. Allergies: Patient has no known allergies.     -------------------------------------------------- RESULTS -------------------------------------------------  Labs:  Results for orders placed or performed during the hospital encounter of 02/01/20   Urinalysis with Microscopic   Result Value Ref Range    Color, UA BLOODY Straw/Yellow    Clarity, UA TURBID (A) Clear    Glucose, Ur 250 (A) Negative mg/dL    Bilirubin Urine Negative Negative    Ketones, Urine 15 (A) Negative mg/dL    Specific Gravity, UA 1.020 1.005 - 1.030    Blood, Urine LARGE (A) Negative    pH, UA 6.5 5.0 - 9.0    Protein, UA >=300 (A) Value Ref Range    ABO/Rh O POS     Antibody Screen NEG        Radiology:  XR CHEST PORTABLE   Final Result   Consolidative opacities are noted within the upper lobes   bilaterally, concerning for multifocal pneumonia. US URINARY BLADDER LIMITED   Final Result      4.3 x 3.7 x 2.2 cm solid hypoechoic mass within the posterior wall of   the right urinary bladder suspicious for malignancy. Enlarged macrolobulated prostate gland measures 5.2 x 5.5 x 4.9 cm. Diffuse urinary bladder wall thickening to 10 mm which could reflect   chronic interstitial or infectious cystitis with malignancy to be   excluded. Nonobstructing 5 mm left calyceal ossification. 11 mm right renal cyst.      Bilateral pleural effusions. US RETROPERITONEAL COMPLETE   Final Result      4.3 x 3.7 x 2.2 cm solid hypoechoic mass within the posterior wall of   the right urinary bladder suspicious for malignancy. Enlarged macrolobulated prostate gland measures 5.2 x 5.5 x 4.9 cm. Diffuse urinary bladder wall thickening to 10 mm which could reflect   chronic interstitial or infectious cystitis with malignancy to be   excluded. Nonobstructing 5 mm left calyceal ossification. 11 mm right renal cyst.      Bilateral pleural effusions. EKG: This EKG is signed by emergency department physician. Rate: 66  Rhythm: Atrial fibrillation  Interpretation: atrial fibrillation (chronic) and left bundle branch block  Comparison: stable as compared to patient's most recent EKG 1/23/20    ------------------------- NURSING NOTES AND VITALS REVIEWED ---------------------------  Date / Time Roomed:  2/1/2020 12:25 PM  ED Bed Assignment:  22/22    The nursing notes within the ED encounter and vital signs as below have been reviewed.    BP (!) 94/55   Pulse 88   Temp 97.4 °F (36.3 °C) (Oral)   Resp 18   Ht 5' 10\" (1.778 m)   Wt 160 lb (72.6 kg)   SpO2 99%   BMI 22.96 kg/m²   Oxygen Saturation Interpretation: Normal        --------------------------------- ADDITIONAL PROVIDER NOTES ---------------------------------    ED Course as of 1532   Sat 2020   1418 Discussed results and plans with patient and wife. All questions answered. []   [address] - she will admit patient. []      ED Course User Index  [] Narciso Bunn DO             Medical Decision Makin-year-old man who presents for evaluation of painless hematuria. Patient is on Xarelto but amount of blood is concerning and ultrasound was ordered which reveals concern for bladder mass  Patient also shortness of breath and x-ray shows right upper lobe pneumonia. Patient on antibiotics will be admitted for treatment of pneumonia as well as urology evaluation for this new bladder mass        Counseling: The emergency provider has spoken with the patient and discussed todays results, in addition to providing specific details for the plan of care and counseling regarding the diagnosis and prognosis. Questions are answered at this time and they are agreeable with the plan.        --------------------------------- IMPRESSION AND DISPOSITION ---------------------------------    IMPRESSION  1. Pneumonia of right upper lobe due to infectious organism ([facility] [address]    2. Hematuria, unspecified type    3. Difficulty voiding        DISPOSITION  Disposition: Admit to telemetry  Patient condition is stable    New Prescriptions    No medications on file       NOTE: This report was transcribed using voice recognition software.  Every effort was made to ensure accuracy; however, inadvertent computerized transcription errors may be present           Narciso Bunn DO  Resident  [phone]

## 2020-02-02 PROBLEM — R31.9 HEMATURIA: Status: RESOLVED | Noted: 2020-01-01 | Resolved: 2020-01-01

## 2020-02-02 PROBLEM — R06.02 SOB (SHORTNESS OF BREATH): Status: RESOLVED | Noted: 2019-01-31 | Resolved: 2020-01-01

## 2020-02-02 PROBLEM — I50.42 CHRONIC COMBINED SYSTOLIC AND DIASTOLIC CONGESTIVE HEART FAILURE (HCC): Chronic | Status: ACTIVE | Noted: 2020-01-01

## 2020-02-02 PROBLEM — I48.0 PAROXYSMAL ATRIAL FIBRILLATION (HCC): Chronic | Status: ACTIVE | Noted: 2020-01-01

## 2020-02-02 PROBLEM — I50.23 SYSTOLIC CHF, ACUTE ON CHRONIC (HCC): Chronic | Status: RESOLVED | Noted: 2020-01-01 | Resolved: 2020-01-01

## 2020-02-02 NOTE — CONSULTS
INPATIENT CONSULTATION RECORD FOR  2/2/2020      [facility]  [address] [place]pedes, [address] [facility]  [phone]        REASON FOR CONSULTATION:      Gross Hematuria    HISTORY OF PRESENT ILLNESS:      The patient is a 67 y.o. male patient who presents with gross hematuria. He was seen in the emergency room at [facility] 2 days ago and was discharged to home. He continued to take his Xarelto. Estee Coronado He is well-known to Dr. Catha Lesch with a history of TURP in 2016. New onset of gross hematuria. He is well-known to Dr. Catha Lesch with a history of TURP in 2016. He presented yesterday to [facility] with recurrent gross hematuria. He takes Xarelto for chronic atrial fibrillation and this was held yesterday. Last evening he was having moderate difficulty with urination but this has resolved. He is voiding well and his urine is clearing in color since holding Xarelto. He has no sensation of bladder fullness at the current time. His wife and son are at his bedside.     Past Medical History:   Diagnosis Date    Atrial fibrillation ([address]     CHF (congestive heart failure) ([address]     Diabetes mellitus ([facility] [facility]     Hypertension     Legal blindness     peripheral vison intact         Past Surgical History:   Procedure Laterality Date    BACK SURGERY      CARPAL TUNNEL RELEASE      EYE SURGERY  05/2018    KNEE SURGERY      PROSTATE SURGERY      SKIN CANCER EXCISION  04/2019    TURP  11/22/2016       Medications Prior to Admission:    Medications Prior to Admission: atorvastatin (LIPITOR) 40 MG tablet, Take 40 mg by mouth daily  digoxin (LANOXIN) 125 MCG tablet, TAKE 1 TABLET BY MOUTH DAILY  rivaroxaban (XARELTO) 20 MG TABS tablet, Take 1 tablet by mouth daily  metoprolol succinate (TOPROL XL) 25 MG extended release tablet, Take 0.5 tablets by mouth 2 times daily  furosemide (LASIX) 40 MG tablet, Take 40 mg by mouth lisandro  Neuro:  Cranial nerves 2-12 intact, no focal deficits  Rectal: deferred  Genitalia:  deferred    LABS:    Lab Results   Component Value Date    WBC 6.9 02/02/2020    HGB 11.0 (L) 02/02/2020    HCT 32.9 (L) 02/02/2020    MCV 97.3 02/02/2020     02/02/2020       Lab Results   Component Value Date    CREATININE 1.0 02/02/2020       Lab Results   Component Value Date    PSA 6.06 (H) 11/22/2016       Recent Labs     01/30/20  1105   LABURIN <10,000 CFU/mL  Mixed gram positive organisms             ASSESSMENT / PLAN:      1. Gross hematuria likely secondary to use of Xarelto. He has atrial fibrillation and is scheduled for Tuesday with Dr. Marques Mccann to undergo cardioversion. His Xarelto is currently on hold due to gross hematuria and he is to be evaluated by Dr. Marques Mccann this admission. His urine is clearer today than yesterday since holding Xarelto. He is able to void without difficulty whatsoever. Today I discussed the pros and cons of [place] catheter insertion and he prefers to be without a catheter if possible. If he is unable to urinate, or if he is having worsening of hematuria I discussed with Quan Aldridge and his family that catheter may be needed. In the meantime he will save a small amount of each voided urine and this will be lined up in the bathroom so that we can assess the change in color with time. 2.  History of benign prostatic hyperplasia status post TURP per Dr. Jenny Ha in 2016.       Wilder Mancera M.D.  8:33 AM  2/2/2020

## 2020-02-02 NOTE — PROGRESS NOTES
Patient had c/o difficulty urinating, bladder scan reported 380cc in bladder. Dr. Govind Anders called Dr. Adriana Lara returned call and ordered a 3 way martel for Pt.  Patient is not willing to allow staff to place martel and wants a urologist to place it, Dr Adriana Lara aware and wishes staff to monitor patient and will place martel in AM.

## 2020-02-02 NOTE — H&P
[address] [facility]  Attending History and Physical      CHIEF COMPLAINT:  hematuria      HISTORY OF PRESENT ILLNESS:      The patient is a 67 y.o. male patient of dr Celine Miller who presents with complains of hematuria. Patient has been having blood in his urine for several days. He is on xarelto for atrial fibrillation. He is scheduled to undergo cardioversion next week. Nonetheless, he was in the ED 2 days ago for the hematuria. His xarelto was held. He continued to have blood in his urine. He presented to the ED. He denied chest pain, shortness of breath, abdominal pain, nausea, vomiting, fevers, chills and diaphoresis. He is resting comfortably in bed at this time.          Past Medical History:    Past Medical History:   Diagnosis Date    [facility] ([facility] [facility]     CHF [facility]congestive heart failure) ([facility]     Diabetes mellitus ([facility]     Hypertension     Legal blindness     peripheral vison intact       Past Surgical History:    Past Surgical History:   Procedure Laterality Date    BACK SURGERY      CARPAL TUNNEL RELEASE      EYE SURGERY  05/2018    KNEE SURGERY      PROSTATE SURGERY      SKIN CANCER EXCISION  04/2019    TURP  11/22/2016       Medications Prior to Admission:    Medications Prior to Admission: atorvastatin (LIPITOR) 40 MG tablet, Take 40 mg by mouth daily  digoxin (LANOXIN) 125 MCG tablet, TAKE 1 TABLET BY MOUTH DAILY  rivaroxaban (XARELTO) 20 MG TABS tablet, Take 1 tablet by mouth daily  metoprolol succinate (TOPROL XL) 25 MG extended release tablet, Take 0.5 tablets by mouth 2 times daily  furosemide (LASIX) 40 MG tablet, Take 40 mg by mouth daily  SPIRONOLACTONE PO, Take 12.5 mg by mouth daily   sacubitril-valsartan (ENTRESTO) 24-26 MG per tablet, Take 1 tablet by mouth 2 times daily  LEVEMIR FLEXTOUCH 100 UNIT/ML injection pen, Inject 12 Units into the skin daily   docusate sodium (COLACE) 100 MG capsule, Take 100 mg by mouth daily   sitagliptan-metformin hypertension    Hyperlipidemia LDL goal <100    Gross hematuria    Pneumonia due to infectious organism    Paroxysmal atrial fibrillation (HCC)    Chronic combined systolic and diastolic congestive heart failure (HCC)         PLAN:    Echo stable. Blood glucose ok, continue to adjust basal/bolus insulin therapy  Blood pressure ok, continue current medications  Continue aggressive lipid therapy  Urology evaluation. Holding xarelto. Antibiotics. Repeat CXR. No clinical signs of pneumonia. Rate control. No cardioversion secondary to anticoagulation interruption. Echo from 1/12/2020 reviewed. EF of 44% with diastolic dysfunction. Euvolemic.   Pt/Ot evaluations for discharge planning    Toy Ormond, MD  11:21 AM  2/2/2020

## 2020-02-02 NOTE — CONSULTS
above and enlarged prostate. He received NS 250cc and Zithromax. He was noted to have NSVT on telemetry monitoring (8 beats) and he received magnesium sulfate 2gm IV. He was admitted to a telemetry monitored unit. Urology was consulted. Cardiology was consulted by [facility] APRN. Please note: past medical records were reviewed per electronic medical record (EMR) - see detailed reports under Past Medical/ Surgical History. Past Medical and Surgical History:    1. Hypertension   2. Diabetes mellitus -- insulin requiring   3. History of bilateral cataract surgery 4/2018  4. History of TURP (11/2016)  5. Bilateral carpal tunnel release   6. TTE: 6/23/2015-mild LVH, EF 60%, indeterminate diastolic function, aneurysmal intra-atrial septum, mild MR, trace TR, RVSP 29 mmHg. 7. TTE: 10/15/2016-EF approximately 45%, E/E prime ratio is > 15 (25) dictating a marked elevation of left atrial pressure, stage II diastolic dysfunction, normal right ventricular size and systolic function, TA PSE is 21 mm consistent with normal global RV function, there is a possible PFO with left-to-right shunting, trace MI  8. Lexiscan stress test: 10/17/2016 EF 27%, mild global hypokinesis, no evidence of exercised stress-induced ischemia  9. Echocardiogram 11/21/2017 [facility]): Moderate concentric LVH. Ef 20-25%. Moderate global hypokinesis. Stage III Diastolic Dysfunction. Akinesis of the basal inferoseptal and basal inferior myocardium. Small pericardial effusion anterior to the heart. No shunt seen. 9.  Lexiscan MPS: 11/22/2017 imaging demonstrates a small perfusion abnormality of mild intensity located in the inferior wall. The defects is fixed. The study results are consistent with a nonischemic cardiomyopathy. EF 24%. 10. Chronic LBBB  11. New onset of AF with RVR (captured on EKG 1/9/2020, duration unknown). -[id number] score at least 5 (Age, HTN, DM, HF). Xarelto initiated   12. PND  · Gastrointestinal: Denies heartburn, nausea/vomiting, diarrhea and constipation, black/bloody, and tarry stools. · Genitourinary: Denies dysuria and complains of hematuria  · Hematologic: Denies excessive bruising or bleeding  · Lymphatic: Denies lumps and bumps to neck, axilla, breast, and groin  · Endocrine: Denies excessive thirst. Denies intolerance to hot and cold  · Psychiatric: Denies anxiety and depression. PHYSICAL EXAM:   BP (!) 94/56   Pulse 62   Temp 98.1 °F (36.7 °C) (Oral)   Resp 16   Ht 5' 10\" (1.778 m)   Wt 163 lb (73.9 kg)   SpO2 94%   BMI 23.39 kg/m²   CONST:  Well developed, well nourished elderly male who appears stated age. Awake, alert, cooperative, no apparent distress  HEENT:   Head- Normocephalic, atraumatic   Eyes- Conjunctivae pink, anicteric  Throat- Oral mucosa pink and moist  Neck-  No stridor, trachea midline, no jugular venous distention. No adenopathy   CHEST: Chest symmetrical and non-tender to palpation. No accessory muscle use or intercostal retractions  RESPIRATORY: Lung sounds - fine crackles bilateral bases  CARDIOVASCULAR:     No carotid bruit  Heart Inspection- shows no noted pulsations  Heart Palpation- no heaves or thrills; PMI is non-displaced   Heart Ausculation- Irregular rate and rhythm, no murmur. No s3,  or rub   PV: Trace-1+ non pitting bilateral lower extremity edema. No varicosities. Pedal pulses palpable, no clubbing or cyanosis   ABDOMEN: Soft, non-tender to light palpation. Bowel sounds present. No palpable masses no organomegaly; no abdominal bruit  MS: Good muscle strength and tone. No atrophy or abnormal movements. : Deferred  SKIN: Warm and dry no statis dermatitis or ulcers   NEURO / PSYCH: Oriented to person, place and time. Speech clear and appropriate. Follows all commands.  Pleasant affect     DATA:    ECG: A Fib with CVR LBBB  Tele strips: A Fib with CVR with 8 beats NSVT  Diagnostic:      Intake/Output Summary (Last 24 hours) at

## 2020-02-03 NOTE — PROGRESS NOTES
Spoke to Dr. Nanda Rosado. Notified of urology wanting to hold discharge and observe patient overnight for PVR.

## 2020-02-03 NOTE — PROGRESS NOTES
Call placed to Dr. Cortes Reynolds answering service regarding [id number]. Awaiting call back/new orders.

## 2020-02-03 NOTE — PROGRESS NOTES
Subjective:  Feeling better   No CP or SOB  No fever or chills   No uncontrolled pain  No vomiting or diarrhea     Objective:    /73   Pulse 64   Temp 98.2 °F (36.8 °C) (Oral)   Resp 16   Ht 5' 10\" (1.778 m)   Wt 163 lb (73.9 kg)   SpO2 95%   BMI 23.39 kg/m²     24HR INTAKE/OUTPUT:      Intake/Output Summary (Last 24 hours) at 2/3/2020 0855  Last data filed at 2/3/2020 0350  Gross per 24 hour   Intake 10 ml   Output 450 ml   Net -440 ml     nad  Heart:  RRR, no murmurs, gallops, or rubs. Lungs:  CTA bilaterally, no wheeze, rales or rhonchi  Abd: bowel sounds present, nontender, nondistended, no masses  Extrem:  No clubbing, cyanosis, or edema    Most Recent Labs  Lab Results   Component Value Date    WBC 7.3 02/03/2020    HGB 10.9 (L) 02/03/2020    HCT 32.1 (L) 02/03/2020     02/03/2020     02/03/2020    K 4.6 02/03/2020     02/03/2020    CREATININE 1.0 02/03/2020    BUN 21 02/03/2020    CO2 20 (L) 02/03/2020    GLUCOSE 146 (H) 02/03/2020    ALT 20 01/30/2020    AST 23 01/30/2020    INR 1.4 02/01/2020    TSH 2.890 10/15/2016    LABA1C 6.4 (H) 02/01/2020     Recent Labs     02/03/20  0545   MG 1.8     Lab Results   Component Value Date    CALCIUM 9.6 02/03/2020        XR CHEST STANDARD (2 VW)   Final Result   No significant change from previous exam.                     XR CHEST PORTABLE   Final Result   Consolidative opacities are noted within the upper lobes   bilaterally, concerning for multifocal pneumonia. US URINARY BLADDER LIMITED   Final Result      4.3 x 3.7 x 2.2 cm solid hypoechoic mass within the posterior wall of   the right urinary bladder suspicious for malignancy. Enlarged macrolobulated prostate gland measures 5.2 x 5.5 x 4.9 cm. Diffuse urinary bladder wall thickening to 10 mm which could reflect   chronic interstitial or infectious cystitis with malignancy to be   excluded. Nonobstructing 5 mm left calyceal ossification.       11 mm right renal cyst.      Bilateral pleural effusions. US RETROPERITONEAL COMPLETE   Final Result      4.3 x 3.7 x 2.2 cm solid hypoechoic mass within the posterior wall of   the right urinary bladder suspicious for malignancy. Enlarged macrolobulated prostate gland measures 5.2 x 5.5 x 4.9 cm. Diffuse urinary bladder wall thickening to 10 mm which could reflect   chronic interstitial or infectious cystitis with malignancy to be   excluded. Nonobstructing 5 mm left calyceal ossification. 11 mm right renal cyst.      Bilateral pleural effusions. NM Cardiac Stress Test Nuclear Imaging    (Results Pending)       Assessment    Principal Problem:    Gross hematuria  Active Problems:    PFO (patent foramen ovale)    Diabetes mellitus type 2, uncontrolled ([address]    Essential hypertension    Hyperlipidemia LDL goal <100    Pneumonia due to infectious organism    Paroxysmal atrial fibrillation (HCC)    Chronic combined systolic and diastolic congestive heart failure ([address]  Resolved Problems:    * No resolved hospital problems.  *      Plan:  Adm to obs  On IV antibiotic for possible pneumonia  Afebrile since admission with normal white count  Bilateral infiltrate in the upper lobes noted on chest x-ray  BNP elevated at 5912  BCx cultures pending  Urine culture pending  Viral panel negative  Check procalcitonin  Rate controlled with metoprolol and digoxin  Holding AC  [facility] H&H transfuse PRN  Urology consult--monitor without [place]  Cardiology consult--for stress test today  Medications for other co morbidities cont as appropriate w dosage adjustments as necessary   PT/OT  DVT PPx  DC planning home when okay with consultants      Electronically signed by Daphnie Vizcarra MD on 2/3/2020 at 8:55 AM

## 2020-02-03 NOTE — PROGRESS NOTES
activity:     Days per week: None     Minutes per session: None    Stress: None   Relationships    Social connections:     Talks on phone: None     Gets together: None     Attends Rastafarian service: None     Active member of club or organization: None     Attends meetings of clubs or organizations: None     Relationship status: None    Intimate partner violence:     Fear of current or ex partner: None     Emotionally abused: None     Physically abused: None     Forced sexual activity: None   Other Topics Concern    None   Social History Narrative    None       Scheduled Meds:   metoprolol succinate  12.5 mg Oral Daily    atorvastatin  40 mg Oral Daily    digoxin  125 mcg Oral Daily    docusate sodium  100 mg Oral Daily    sodium chloride flush  10 mL Intravenous 2 times per day    insulin lispro  0-6 Units Subcutaneous Q4H    cefTRIAXone (ROCEPHIN) IV  1 g Intravenous Q24H     Continuous Infusions:   dextrose       PRN Meds:.regadenoson, sodium chloride flush, magnesium hydroxide, ondansetron, acetaminophen, glucose, dextrose, glucagon (rDNA), dextrose    ASSESSMENT:    Patient Active Problem List   Diagnosis    PFO (patent foramen ovale)    Diabetes mellitus type 2, uncontrolled ([facility]    Essential hypertension    Hyperlipidemia LDL goal <100    Gross hematuria    Pneumonia due to infectious organism    Paroxysmal atrial fibrillation (HCC)    Chronic combined systolic and diastolic congestive heart failure ([facility]       PLAN:  Will continue without martel and monitor pvr    Aidan Davila M.D.  2/3/2020  7:30 AM

## 2020-02-03 NOTE — PROGRESS NOTES
Comments/Treatment:  RN stated pt needed PT eval ( due to observation status) . Pt unsteady with gait, may benefit from AD        Examination of body systems Decreased   Functional mobility x   ROM    Strength x   Safety Awareness    Cognition    Endurance x   Sensation    Balance x   Vision/Visual Deficits    Coordination        Patient education  Pt educated on  Fall risk, safety with mobility     Patient response to education:   Pt verbalized understanding Pt demonstrated skill Pt requires further education in this area   x  x     Rehab potential is Good for reaching above PT goals. Pts/ family goals   1. None stated     Patient and or family understand(s) diagnosis, prognosis, and plan of care. -  Yes     PLAN  PT care will be provided in accordance with the objectives noted above. Whenever appropriate, clear delegation orders will be provided for nursing staff. Exercises and functional mobility practice will be used as well as appropriate assistive devices or modalities to obtain goals. Patient and family education will also be administered as needed. Frequency of treatments will be 2-3 x/week x  5 days.     Time in:  [place]   Time out: [address] [place] [address] number:  [phone]

## 2020-02-03 NOTE — PROGRESS NOTES
[facility] Cardiology Inpatient Progress Note    Patient is a 67 y.o. male of Sen De Paz MD seen in hospital follow up. Chief complaint: CP/Afib/Hematuria    HPI: No CP or SOB.      Patient Active Problem List   Diagnosis    PFO (patent foramen ovale)    Diabetes mellitus type 2, uncontrolled [facility]    Essential hypertension    Hyperlipidemia LDL goal <100    Gross hematuria    Pneumonia due to infectious organism    Paroxysmal atrial fibrillation (HCC)    Chronic combined systolic and diastolic congestive heart failure (HCC)       No Known Allergies    Current Facility-Administered Medications   Medication Dose Route Frequency Provider Last Rate Last Dose    metoprolol succinate (TOPROL XL) extended release tablet 12.5 mg  12.5 mg Oral Daily Oleg Arellano DO   12.5 mg at 02/02/20 3021    regadenoson (LEXISCAN) injection 0.4 mg  0.4 mg Intravenous ONCE PRN Oleg Arellano DO        atorvastatin (LIPITOR) tablet 40 mg  40 mg Oral Daily Tanna Oli Humfleet, APRN - CNP   40 mg at 02/02/20 0802    digoxin (LANOXIN) tablet 125 mcg  125 mcg Oral Daily Tanna Oli Humfleet, APRN - CNP   125 mcg at 02/02/20 0802    docusate sodium (COLACE) capsule 100 mg  100 mg Oral Daily Tanna Oli Humfleet, APRN - CNP   100 mg at 02/02/20 0802    sodium chloride flush 0.9 % injection 10 mL  10 mL Intravenous 2 times per day Aaliyah Mathew APRN - CNP   10 mL at 02/02/20 2043    sodium chloride flush 0.9 % injection 10 mL  10 mL Intravenous PRN Carey Harveyfleet, APRN - CNP        magnesium hydroxide (MILK OF MAGNESIA) 400 MG/5ML suspension 30 mL  30 mL Oral Daily PRN Carey PRINCE Humfleet, APRN - CNP        ondansetron (ZOFRAN) injection 4 mg  4 mg Intravenous Q6H PRN Carey PRINCE Humfleet, APRN - CNP        acetaminophen (TYLENOL) tablet 650 mg  650 mg Oral Q4H PRN Carey N Humfleet, APRN - CNP        glucose (GLUTOSE) 40 % oral gel 15 g  15 g Oral PRN Tanna Oli Humfleet, APRN - CNP        dextrose 50 % IV solution  12.5 g Intravenous PRN Carey Harveyfleet, APRN - CNP        glucagon (rDNA) injection 1 mg  1 mg Intramuscular PRN Carey Hudson, APRN - CNP        dextrose 5 % solution  100 mL/hr Intravenous PRN Carey N Candicefleet, APRN - CNP        insulin lispro (HUMALOG) injection vial 0-6 Units  0-6 Units Subcutaneous Q4H Carey N Candicefleet, APRN - CNP   1 Units at 02/03/20 0558    cefTRIAXone (ROCEPHIN) 1 g in dextrose 5 % 50 mL IVPB (vial-mate)  1 g Intravenous Q24H Jude Mireles Candicefleet, APRN - CNP   Stopped at 02/02/20 1539       Review of systems:   Heart: as above   Lungs: as above   Eyes: denies changes in vision or discharge. Ears: denies changes in hearing or pain. Nose: denies epistaxis or masses   Throat: denies sore throat or trouble swallowing. Neuro: denies numbness, tingling, tremors. Skin: denies rashes or itching. : denies hematuria, dysuria   GI: denies vomiting, diarrhea   Psych: denies mood changed, anxiety, depression. Physical Exam   /73   Pulse 64   Temp 98.2 °F (36.8 °C) (Oral)   Resp 16   Ht 5' 10\" (1.778 m)   Wt 163 lb (73.9 kg)   SpO2 95%   BMI 23.39 kg/m²   Constitutional: Oriented to person, place, and time. No distress. Well developed. Head: Normocephalic and atraumatic. Neck: Neck supple. No hepatojugular reflux. No JVD present. Carotid bruit is not present. No tracheal deviation present. No thyromegaly present. Cardiovascular: Normal rate, regular rhythm, normal heart sounds. intact distal pulses. No gallop and no friction rub. No murmur heard. Pulmonary: Breath sounds normal. No respiratory distress. No wheezes. No rales. Chest: Effort normal. No tenderness. Abdominal: Soft. Bowel sounds are normal. No distension or mass. No tenderness, rebound or guarding. Musculoskeletal: . No tenderness. No clubbing or cyanosis. Extremitites: Intact distal pulses.  No edema  Neurological: Alert and oriented to person, Reconsider when we can safely resume anticoagulation as cardioversion will obligate him to 1 month minimum uninterrupted anticoagulation. 2. Cardiomyopathy: BB and Entresto held for hypotension. Continue lasix IV daily. Continue digoxin. Re-institute his HF medications cautiously starting with the BB follow by Alicia Tavares then aldactone if his BP will tolerate. 3. VHD: Moderate MR 1/9/2020. Observe. 4. Exertional chest pain/Elevated troponin: He may need procedural intervention for his  issues requiring risk stratification. Pharm stress this am.     5. Hematuria/ issues: Per urology. 6. Hypotension: Medications held. Observe. 7. NSVT: Maintain K and mag. Restart BB. 8. Hx of HTN: Observe. 9. DM: Per primary service. 10. Lipids: Statin. 11. Anemia: Follow labs. Germain Kyle D.O. Cardiologist  Cardiology, [facility]) Physicians    Addendum:    Stress Summary 2/2/2020:  1. Perfusion imaging suggests a small area of mildly reversible   ischemia at the inferolateral apical wall without a clear   corresponding segmental wall motion abnormality in this globally   hypokinetic left ventricle. This reduces the positive predictive value   of the perfusion findings. 2. Ejection fraction is 18 %. 3. Dilated LV. Global hypokinesis. Stress with a small area of reversibility. Overall given the burden the risk is low. Medical management at this point recommended given ongoing hematuria issues. Continue statin/BB. Carefully add nitrate. Revisit notion of cath if further progression of symptoms or when his hematuria issues are resolved. Germain Kyle D.O.   Cardiologist  Cardiology, [facility]

## 2020-02-04 NOTE — PROGRESS NOTES
[facility]  PROGRESS NOTE    Chief Complaint:  BPH (S/P TURP--11/22/16)/Gross hematuria/Possible bladder mass/Elevated PSA    HPI: He is weak and tired. He is voiding comfortably and does not wish to have a [place] placed. He is not passing clots. He denies dysuria. His oral intake has been poor. His family is at his bedside today. Vitals:    02/04/20 0930   BP: 123/65   Pulse: 68   Resp: 14   Temp: 97.5 °F (36.4 °C)   SpO2:        Allergies: Patient has no known allergies.     PAST MEDICAL HISTORY:   Past Medical History:   Diagnosis Date    Atrial fibrillation ([address]     CHF (congestive heart failure) ([address]     Diabetes mellitus [facility]     Hypertension     Legal blindness     peripheral vison intact       PAST SURGICAL HISTORY:   Past Surgical History:   Procedure Laterality Date    BACK SURGERY      CARPAL TUNNEL RELEASE      EYE SURGERY  05/2018    KNEE SURGERY      PROSTATE SURGERY      SKIN CANCER EXCISION  04/2019    TURP  11/22/2016        PAST FAMILY HISTORY:    Family History   Problem Relation Age of Onset    Stroke Mother     Cancer Sister        PAST SOCIAL HISTORY:    Social History     Socioeconomic History    Marital status:      Spouse name: None    Number of children: None    Years of education: None    Highest education level: None   Occupational History    None   Social Needs    Financial resource strain: None    Food insecurity:     Worry: None     Inability: None    Transportation needs:     Medical: None     Non-medical: None   Tobacco Use    Smoking status: Never Smoker    Smokeless tobacco: Never Used   Substance and Sexual Activity    Alcohol use: Yes     Comment: occassionally    Drug use: No    Sexual activity: None   Lifestyle    Physical activity:     Days per week: None     Minutes per session: None    Stress: None   Relationships    Social connections:     Talks on phone: None     Gets together: None     Attends Yazidi service: None Active member of club or organization: None     Attends meetings of clubs or organizations: None     Relationship status: None    Intimate partner violence:     Fear of current or ex partner: None     Emotionally abused: None     Physically abused: None     Forced sexual activity: None   Other Topics Concern    None   Social History Narrative    None       IV:    dextrose         PRN: sodium chloride flush, magnesium hydroxide, ondansetron, acetaminophen, glucose, dextrose, glucagon (rDNA), dextrose    Scheduled:    furosemide  40 mg Intravenous Daily    spironolactone  12.5 mg Oral Daily    isosorbide mononitrate  30 mg Oral Daily    tamsulosin  0.4 mg Oral Daily    metoprolol succinate  12.5 mg Oral Daily    atorvastatin  40 mg Oral Daily    digoxin  125 mcg Oral Daily    docusate sodium  100 mg Oral Daily    sodium chloride flush  10 mL Intravenous 2 times per day    insulin lispro  0-6 Units Subcutaneous Q4H       Lab Results   Component Value Date     02/04/2020    K 4.9 02/04/2020    K 4.4 02/02/2020    BUN 21 02/04/2020    CREATININE 1.1 02/04/2020        Lab Results   Component Value Date    HGB 10.8 02/04/2020    HCT 32.0 02/04/2020       Lab Results   Component Value Date    PSA 6.06 (H) 11/22/2016       Review Of Systems:  Constitutional: Tired  Eyes: negative  Ears, nose, mouth, throat, and face: negative  Respiratory: negative  Cardiovascular: Atrial fib  Gastrointestinal: negative  Genitourinary: BPH  Musculoskeletal: negative  Neurological: negative  Behavioral/Psych: negative  Endocrine: Diabetes    Physical Exam:  Skin dry, without rashes  Respirations non-labored, intact  Abdomen soft, non-tender, non-distended. Active bowel sounds. Alert and oriented x3      Assessment and Plan:  BPH (S/P TURP--11/22/16)/Gross hematuria/Possible bladder mass/Elevated PSA  -He underwent a TURP on 11/22/2016. Pathology showed benign prostate tissue without evidence of malignancy.   He has been voiding well since that time. He will continue Flomax at least for now  -Urine culture on 2/1/2020 is negative. Urine cytology is pending  -He was encouraged to increase his oral fluid intake  -Ultrasound on 2/1/2020 was reviewed. There is an enlarged prostate with diffuse chronic bladder wall thickening consistent with chronic bladder outlet obstruction. There is also a benign right renal cyst and suggestion of a possible mass within urinary bladder. This, to me, appears to be more consistent with a blood clot layering within the bladder. He will need cystoscopy at some point which will be done in the office as an outpatient  -Hematuria is related to Xarelto use which has been hold. His hematuria is clearing. He would certainly like to avoid a catheter. Bladder scan residuals are slightly elevated but acceptable. His bladder is not distended on examination today. Cardiology is evaluating him for potential cardioversion it appears. If this could be done to limit the need for further anticoagulation, it certainly would be beneficial from a urology standpoint as he is likely to continue to bleed from his prostate in the setting of chronic anticoagulation. He states that Xarelto is very expensive for him anyway. Potentially another anticoagulant could be considered to be used which could be reversed if necessary. Otherwise, hopefully his anticoagulant could be restarted tomorrow or the next day and gradually phased in to monitor for worsening, recurrent hematuria.  -We will follow him during his hospital stay.       Kishan Doe MD  2/4/2020  1:44 PM

## 2020-02-04 NOTE — PLAN OF CARE
Problem: Falls - Risk of:  Goal: Will remain free from falls  Description  Will remain free from falls  2/4/2020 1855 by Ruthie Kumar RN  Outcome: Met This Shift     Problem: Falls - Risk of:  Goal: Absence of physical injury  Description  Absence of physical injury  2/4/2020 1855 by Ruthie Kumar RN  Outcome: Met This Shift     Problem: Bleeding:  Goal: Will show no signs and symptoms of excessive bleeding  Description  Will show no signs and symptoms of excessive bleeding  2/4/2020 1855 by Ruthie Kumar RN  Outcome: Met This Shift

## 2020-02-04 NOTE — PROGRESS NOTES
[facility] Quality Flow/Interdisciplinary Rounds Progress Note        Quality Flow Rounds held on February 4, 2020    Disciplines Attending:  Bedside Nurse, Nursing Unit Leadership and charge nurse    Elza Navarrete was admitted on 2/1/2020 12:25 PM    Anticipated Discharge Date:  Expected Discharge Date: 02/03/20    Disposition:    Jayson Score:  Jayson Scale Score: 20    Readmission Risk              Risk of Unplanned Readmission:        12           Discussed patient goal for the day, patient clinical progression, and barriers to discharge.   The following Goal(s) of the Day/Commitment(s) have been identified:  Check C[facility]diology and [facility]rology [facility]  February 4, 2020

## 2020-02-04 NOTE — PROGRESS NOTES
Dr linus roman covering dr Caridad Reinoso. Subjective: The patient is awake and alert. High post void residuals overnight. Patient does not want martel. Denies chest pain, angina, and dyspnea. Denies abdominal pain. Tolerating diet. No nausea or vomiting. Objective:    /63   Pulse 73   Temp 97.9 °F (36.6 °C) (Oral)   Resp 16   Ht 5' 10\" (1.778 m)   Wt 176 lb 4 oz (79.9 kg)   SpO2 96%   BMI 25.29 kg/m²     Current medications that patient is taking have been reviewed. Heart:  RRR, no murmurs, gallops, or rubs.   Lungs:  CTA bilaterally, no wheeze, rales or rhonchi  Abd: bowel sounds present, soft, nontender, nondistended, no masses  Extrem:  No cyanosis or edema    CBC with Differential:    Lab Results   Component Value Date    WBC 6.8 02/04/2020    RBC 3.29 02/04/2020    HGB 10.8 02/04/2020    HCT 32.0 02/04/2020     02/04/2020    MCV 97.3 02/04/2020    MCH 32.8 02/04/2020    MCHC 33.8 02/04/2020    RDW 13.6 02/04/2020    LYMPHOPCT 25.4 02/03/2020    MONOPCT 10.8 02/03/2020    BASOPCT 0.4 02/03/2020    MONOSABS 0.79 02/03/2020    LYMPHSABS 1.86 02/03/2020    EOSABS 0.25 02/03/2020    BASOSABS 0.03 02/03/2020     CMP:    Lab Results   Component Value Date     02/04/2020    K 4.9 02/04/2020    K 4.4 02/02/2020     02/04/2020    CO2 23 02/04/2020    BUN 21 02/04/2020    CREATININE 1.1 02/04/2020    GFRAA >60 02/04/2020    LABGLOM >60 02/04/2020    GLUCOSE 340 02/04/2020    PROT 6.4 02/04/2020    LABALBU 3.8 02/04/2020    CALCIUM 9.9 02/04/2020    BILITOT 0.4 02/04/2020    ALKPHOS 68 02/04/2020    AST 23 02/04/2020    ALT 25 02/04/2020     BMP:    Lab Results   Component Value Date     02/04/2020    K 4.9 02/04/2020    K 4.4 02/02/2020     02/04/2020    CO2 23 02/04/2020    BUN 21 02/04/2020    LABALBU 3.8 02/04/2020    CREATININE 1.1 02/04/2020    CALCIUM 9.9 02/04/2020    GFRAA >60 02/04/2020    LABGLOM >60 02/04/2020    GLUCOSE 340 02/04/2020     Magnesium: Lab Results   Component Value Date    MG 1.8 02/03/2020     Phosphorus:  No results found for: PHOS  PT/INR:    Lab Results   Component Value Date    PROTIME 16.7 02/01/2020    INR 1.4 02/01/2020     PTT:    Lab Results   Component Value Date    APTT 31.9 02/01/2020   [APTT}     Assessment:    Patient Active Problem List   Diagnosis    PFO (patent foramen ovale)    Diabetes mellitus type 2, uncontrolled ([facility]    Essential hypertension    Hyperlipidemia LDL goal <100    Gross hematuria    Pneumonia due to infectious organism    Paroxysmal atrial fibrillation (HCC)    Chronic combined systolic and diastolic congestive heart failure ([facility]       Plan:  Stable. Blood glucose ok, continue to adjust basal/bolus insulin therapy  Blood pressure ok, continue current medications  Continue aggressive lipid therapy  Urology on board. Follow recommendations. Antibiotics. Rate controlled. Euvolemic. Pt/Ot evaluations for discharge planning. Discharge once ok with urology.     Honor Susan    6:36 PM  2/4/2020

## 2020-02-04 NOTE — PROGRESS NOTES
due to infectious organism    Paroxysmal atrial fibrillation (HCC)    Chronic combined systolic and diastolic congestive heart failure ([facility]     1. AFib: Rate control with dig and toprol. Anticoagulation held due to hematuria. Was planned to have TISH-DCCV this week, however given the need to interrupt his anticoagulation we will rate control only at this point. Reconsider when we can safely resume anticoagulation as cardioversion will obligate him to 1 month minimum uninterrupted anticoagulation. 2. Cardiomyopathy:      Continue BB/dig/lasix IV daily. Restart aldactone. Entresto held for hypotension. Re-institute his HF medications cautiously. 3. VHD: Moderate MR 1/9/2020. Observe. 4. Exertional chest pain/Elevated troponin:     He may need procedural intervention for his  issues requiring risk stratification. Stress with a small area of reversibility. Overall given the burden the risk is low. Medical management at this point recommended given ongoing hematuria issues. Continue statin/BB/nitrate. Revisit notion of cath if further progression of symptoms or when his hematuria issues are resolved. 5. Hematuria/ issues: Per urology. 6. Hypotension: Medications held. Observe. 7. NSVT: Maintain K and mag. BB.     8. Hx of HTN: Observe. 9. DM: Per primary service. 10. Lipids: Statin. 11. Anemia: Follow labs. Sissy Hughes D.O.   Cardiologist  Cardiology, [facility]

## 2020-02-05 NOTE — PROGRESS NOTES
Nonobstructing 5 mm left calyceal ossification.       11 mm right renal cyst.       Bilateral pleural effusions.        Assessment: Angelena Hashimoto 67 y.o. male     BPH (S/P TURP--11/22/16)  Gross hematuria with blood clot found on US  Possible bladder mass  Elevated PSA  Right renal cyst  Left renal calc    Plan:    Cont the martel  Cont the irrigations   Pathology was reviewed  PSA was reviewed  Cytology is pending   Will need a cyst at some point   JD was reviewed  The mass could be just the blood clot      Terry Curran,    STEPAN  Urology

## 2020-02-05 NOTE — PROGRESS NOTES
[facility] Cardiology Inpatient Progress Note    Patient is a 67 y.o. male of Paola Martinez MD seen in hospital follow up. Chief complaint: CP/Afib/Hematuria    HPI: No CP. Some SOB.      Patient Active Problem List   Diagnosis    PFO (patent foramen ovale)    Diabetes mellitus type 2, uncontrolled ([facility] [address]    Essential hypertension    Hyperlipidemia LDL goal <100    Gross hematuria    Pneumonia due to infectious organism    Paroxysmal atrial fibrillation (HCC)    Chronic combined systolic and diastolic congestive heart failure (HCC)       No Known Allergies    Current Facility-Administered Medications   Medication Dose Route Frequency Provider Last Rate Last Dose    insulin glargine (LANTUS) injection vial 10 Units  10 Units Subcutaneous Nightly Pamela Zamora MD        furosemide (LASIX) injection 40 mg  40 mg Intravenous Daily Sabino Zepeda, DO   40 mg at 02/05/20 0757    spironolactone (ALDACTONE) tablet 12.5 mg  12.5 mg Oral Daily Sabino Zepeda, DO   12.5 mg at 02/05/20 0755    glucose (GLUTOSE) 40 % oral gel 15 g  15 g Oral PRN Pamela Zamora MD        dextrose 50 % IV solution  12.5 g Intravenous PRN Pamela Zamora MD        glucagon (rDNA) injection 1 mg  1 mg Intramuscular PRN Pamela Zamora MD        dextrose 5 % solution  100 mL/hr Intravenous PRN Pamela Zamora MD        insulin lispro (HUMALOG) injection vial 0-12 Units  0-12 Units Subcutaneous TID WC Pamela Zamora MD        insulin lispro (HUMALOG) injection vial 0-6 Units  0-6 Units Subcutaneous Nightly Pamela Zamora MD   2 Units at 02/04/20 2152    isosorbide mononitrate (IMDUR) extended release tablet 30 mg  30 mg Oral Daily Sabino Zepeda, DO   30 mg at 02/05/20 0755    tamsulosin (FLOMAX) capsule 0.4 mg  0.4 mg Oral Daily Pete A Tani, DO   0.4 mg at 02/05/20 0756    metoprolol succinate (TOPROL XL) extended release tablet 12.5 mg  12.5 mg Oral Daily Sabino Zepeda, DO   12.5 mg at 02/05/20 0755    atorvastatin

## 2020-02-05 NOTE — PROGRESS NOTES
Nutrition Education    Type and Reason for Visit: Consult, Patient Education    Nutrition Assessment:  Reviewed the Carbohydrate Controlled diet guidelines w/ patient and wife at bedside. Answered all questions at this time and provided a written handout w/ portion sizes, label reading, menu planning. Contact name/number provided for any additional questions/concerns. · Verbally reviewed information with Patient and Family  · Written educational materials provided. · Contact name and number provided.     Electronically signed by Sisi Lin MS, RD, LD on 2/5/20 at 3:02 PM    Contact Number: [phone]

## 2020-02-05 NOTE — PROGRESS NOTES
Physical Therapy  Facility/Department: [address] INTERMEDIATE 1  Daily Treatment Note  NAME: Yovani Adkins  : 1940  MRN: [id number]    Date of Service: 2020    Patient Diagnosis(es): The primary encounter diagnosis was Pneumonia of right upper lobe due to infectious organism [facility]). Diagnoses of Hematuria, unspecified type and Difficulty voiding were also pertinent to this visit. has a past medical history of Atrial fibrillation ([facility] [address] CHF (congestive heart failure) ([facility] [address] Diabetes mellitus ([facility] [address] Hypertension, and Legal blindness. has a past surgical history that includes knee surgery; Carpal tunnel release; back surgery; TURP (2016); Eye surgery (2018); Skin cancer excision (2019); and Prostate surgery. Evaluating Therapist: Berenice Hanna PT            Room #: 873  DIAGNOSIS:  Hematuria   PRECAUTIONS: falls      Social:  Pt lives with  Wife  in a split level  floor plan  2  steps and  1 rails to enter. 7 steps between levels   Prior to admission pt walked with no AD . Has ww       Initial Evaluation  Date:  2/3/2020 Treatment  20    Short Term/ Long Term   Goals   Was pt agreeable to Eval/treatment? yes   yes     Does pt have pain?  none reported  No c/o pain      Bed Mobility  Rolling:  Independent   Supine to sit: Independent   Sit to supine:  Independent onto transport cart   Scooting: independent    NT Independent    Transfers Sit to stand: SBA   Stand to sit:  SBA   Stand pivot:  SBA   sit <> stand SBA with cues for safey  independent    Ambulation     150  feet with  No AD  with  SBA   150 feet x2 with no device SBA  200 feet with no AD/AAD independent          Stair negotiation: ascended and descended NT  - transport arrived to take pt for taking      8  steps with  1 rail with S/I    LE ROM  WFL       LE strength  WFL        AM- PAC RAW score             Pt performed therapeutic exercise of the following: chair pushups x10, seated LAQ and marching x15. Nolvia Buchanan

## 2020-02-05 NOTE — PROGRESS NOTES
1.8 02/03/2020     Phosphorus:  No results found for: PHOS  PT/INR:    Lab Results   Component Value Date    PROTIME 16.7 02/01/2020    INR 1.4 02/01/2020     PTT:    Lab Results   Component Value Date    APTT 31.9 02/01/2020   [APTT}     Assessment:    Patient Active Problem List   Diagnosis    PFO (patent foramen ovale)    Diabetes mellitus type 2, uncontrolled ([facility] [address]    Essential hypertension    Hyperlipidemia LDL goal <100    Gross hematuria    Pneumonia due to infectious organism    Paroxysmal atrial fibrillation (HCC)    Chronic combined systolic and diastolic congest[facility] failure ([facility]       Plan:  Stable. Blood glucose ok, continue to adjust basal/bolus insulin therapy  Blood pressure ok, continue current medications  Continue aggressive lipid therapy  Urology on board. Follow recommendations. Antibiotics. Rate controlled. Euvolemic. Pt/Ot evaluations for discharge planning. Discharge once ok with urology.     Xiomara Ochoa    7:32 AM  2/5/2020

## 2020-02-06 NOTE — PROGRESS NOTES
present. No thyromegaly present. Cardiovascular: Normal rate, regular rhythm, normal heart sounds. intact distal pulses. No gallop and no friction rub. No murmur heard. Pulmonary: Breath sounds normal. No respiratory distress. No wheezes. No rales. Chest: Effort normal. No tenderness. Abdominal: Soft. Bowel sounds are normal. No distension or mass. No tenderness, rebound or guarding. Musculoskeletal: . No tenderness. No clubbing or cyanosis. Extremitites: Intact distal pulses. No edema  Neurological: Alert and oriented to person, place, and time. Skin: Skin is warm and dry. No rash noted. Not diaphoretic. No erythema. Psychiatric: Normal mood and affect. Behavior is normal.   Lymphadenopathy: No cervical adenopathy. No groin adenopathy. CBC:   Lab Results   Component Value Date    WBC 6.8 02/04/2020    RBC 3.29 02/04/2020    HGB 10.8 02/04/2020    HCT 32.0 02/04/2020    MCV 97.3 02/04/2020    MCH 32.8 02/04/2020    MCHC 33.8 02/04/2020    RDW 13.6 02/04/2020     02/04/2020    MPV 12.2 02/04/2020     BMP:   Lab Results   Component Value Date     02/05/2020    K 3.9 02/05/2020     02/05/2020    CO2 22 02/05/2020    BUN 20 02/05/2020    LABALBU 3.8 02/04/2020    CREATININE 1.2 02/05/2020    CALCIUM 9.8 02/05/2020    GFRAA >60 02/05/2020    LABGLOM 59 02/05/2020     Magnesium:    Lab Results   Component Value Date    MG 1.8 02/03/2020     Cardiac Enzymes:   Lab Results   Component Value Date    CKTOTAL 57 10/31/2016    CKTOTAL 42 10/24/2016    CKTOTAL 39 10/20/2016    CKMB 3.9 10/16/2016    CKMB 6.1 10/15/2016    TROPONINI 0.05 (H) 02/03/2020    TROPONINI 0.05 (H) 02/03/2020    TROPONINI 0.03 01/09/2020      PT/INR:    Lab Results   Component Value Date    PROTIME 16.7 02/01/2020    INR 1.4 02/01/2020     TSH:    Lab Results   Component Value Date    TSH 2.890 10/15/2016     Rhythm Strip: Afib. Stress Summary 2/2/2020:  1.  Perfusion imaging suggests a small area of mildly labs.    Memo Bernard D.O.   Cardiologist  Cardiology, [facility]

## 2020-02-06 NOTE — PROGRESS NOTES
Physical Therapy  Facility/Department: [address] INTERMEDIATE 1  Daily Treatment Note  NAME: Marianela Isbell  : 1940  MRN: [id number]    Date of Service: 2020  Patient Diagnosis(es): The primary encounter diagnosis was Pneumonia of right upper lobe due to infectious organism [facility]). Diagnoses of Hematuria, unspecified type and Difficulty voiding were also pertinent to this visit. has a past medical history of Atrial fibrillation ([facility] [address] CHF (congestive heart failure) ([facility] [address] Diabetes mellitus ([facility] [address] Hypertension, and Legal blindness. has a past surgical history that includes knee surgery; Carpal tunnel release; back surgery; TURP (2016); Eye surgery (2018); Skin cancer excision (2019); and Prostate surgery.       Evaluating Therapist: Carmelo Velez, PT            Room #: [address] [place] [address]     Social:  Pt lives [place] Sample a split level  floor plan  2  steps and  1 rails to enter. 7 steps between levels   Prior to admission pt walked with no AD .  Has ww       Initial Evaluation  Date:  2/3/2020 Treatment  20    Short Term/ Long Term   Goals   Was pt agreeable to Eval/treatment?  yes   yes     Does pt have pain?  none reported  No c/o pain      Bed Mobility  Rolling:  Independent   Supine to sit:  Independent   Sit to supine:  Independent onto transport cart   Scooting: independent    NT Independent    Transfers Sit to stand: SBA   Stand to sit:  SBA   Stand pivot:  SBA   sit <> stand independent  independent    Ambulation     150  feet with  No AD  with  SBA   150 feet x2 with no device SBA  200 feet with no AD/AAD independent          Stair negotiation: ascended and descended NT  - transport arrived to take pt for taking      8  steps with  1 rail with S/I    LE ROM  WFL       LE strength  WFL        AM- PAC RAW score            Additional Comments: SpO2 on room air with ambulation 99%    Pt was left in chair with call light left by patient. Chair/bed alarm: yes    Time out: 0902    Pt is making good progress toward established Physical Therapy goals. Continue with physical therapy current plan of care.     [facility] PSYCHIATRY PT [phone]

## 2020-02-06 NOTE — PROGRESS NOTES
Dr linus roman covering dr Ita Cardona. Subjective: The patient is awake and alert. No issues overnight. Denies chest pain, angina, and dyspnea. Denies abdominal pain. Tolerating diet. No nausea or vomiting. Objective:    BP 92/66   Pulse 63   Temp 98 °F (36.7 °C) (Oral)   Resp 16   Ht 5' 10\" (1.778 m)   Wt 170 lb (77.1 kg)   SpO2 94%   BMI 24.39 kg/m²     Current medications that patient is taking have been reviewed. Heart:  RRR, no murmurs, gallops, or rubs.   Lungs:  CTA bilaterally, no wheeze, rales or rhonchi  Abd: bowel sounds present, soft, nontender, nondistended, no masses  Extrem:  No cyanosis or edema    CBC with Differential:    Lab Results   Component Value Date    WBC 6.8 02/04/2020    RBC 3.29 02/04/2020    HGB 10.8 02/04/2020    HCT 32.0 02/04/2020     02/04/2020    MCV 97.3 02/04/2020    MCH 32.8 02/04/2020    MCHC 33.8 02/04/2020    RDW 13.6 02/04/2020    LYMPHOPCT 25.4 02/03/2020    MONOPCT 10.8 02/03/2020    BASOPCT 0.4 02/03/2020    MONOSABS 0.79 02/03/2020    LYMPHSABS 1.86 02/03/2020    EOSABS 0.25 02/03/2020    BASOSABS 0.03 02/03/2020     CMP:    Lab Results   Component Value Date     02/05/2020    K 3.9 02/05/2020     02/05/2020    CO2 22 02/05/2020    BUN 20 02/05/2020    CREATININE 1.2 02/05/2020    GFRAA >60 02/05/2020    LABGLOM 59 02/05/2020    GLUCOSE 75 02/05/2020    PROT 6.4 02/04/2020    LABALBU 3.8 02/04/2020    CALCIUM 9.8 02/05/2020    BILITOT 0.4 02/04/2020    ALKPHOS 68 02/04/2020    AST 23 02/04/2020    ALT 25 02/04/2020     BMP:    Lab Results   Component Value Date     02/05/2020    K 3.9 02/05/2020     02/05/2020    CO2 22 02/05/2020    BUN 20 02/05/2020    LABALBU 3.8 02/04/2020    CREATININE 1.2 02/05/2020    CALCIUM 9.8 02/05/2020    GFRAA >60 02/05/2020    LABGLOM 59 02/05/2020    GLUCOSE 75 02/05/2020     Magnesium:    Lab Results   Component Value Date    MG 1.8 02/03/2020     Phosphorus:    Lab Results   Component

## 2020-02-06 NOTE — PROGRESS NOTES
[facility]                                                            PROGRESS NOTE                                                                       2/6/2020          SUBJECTIVE:    Urine is now grossly clear  Pt is voiding comfortably  Creatinine is 1.1  Urine cytology neg     OBJECTIVE:    BP (!) 92/54   Pulse 79   Temp 97.4 °F (36.3 °C) (Oral)   Resp 18   Ht 5' 10\" (1.778 m)   Wt 170 lb (77.1 kg)   SpO2 93%   BMI 24.39 kg/m²     PHYSICAL EXAMINATION:  Skin: dry, without rashes  Respirations: non-labored, intact  Abdomen: soft, non-tender, non-distended      Lab Results   Component Value Date    WBC 6.8 02/04/2020    HGB 10.8 (L) 02/04/2020    HCT 32.0 (L) 02/04/2020    MCV 97.3 02/04/2020     02/04/2020       Lab Results   Component Value Date    CREATININE 1.2 02/05/2020       Lab Results   Component Value Date    PSA 6.06 (H) 11/22/2016       REVIEW OF SYSTEMS:    CONSTITUTIONAL: negative  HEENT: negative  HEMATOLOGIC: negative  ENDOCRINE: negative  RESPIRATORY: negative  CV: negative  GI: negative  NEURO: negative  ORTHOPEDICS: negative  PSYCHIATRIC: negative  : as above    PAST FAMILY HISTORY:    Family History   Problem Relation Age of Onset    Stroke Mother     Cancer Sister      PAST SOCIAL HISTORY:    Social History     Socioeconomic History    Marital status:      Spouse name: None    Number of children: None    Years of education: None    Highest education level: None   Occupational History    None   Social Needs    Financial resource strain: None    Food insecurity:     Worry: None     Inability: None    Transportation needs:     Medical: None     Non-medical: None   Tobacco Use    Smoking status: Never Smoker    Smokeless tobacco: Never Used   Substance and Sexual Activity    Alcohol use: Yes     Comment: occassionally    Drug use: No    Sexual activity: None   Lifestyle    Physical activity:     Days

## 2020-02-07 NOTE — CARE COORDINATION
[address] Transitions Initial Follow Up Call    Call within 2 business days of discharge: Yes    Patient: Mercy Hemp Patient : 1940   MRN: [id number]  Reason for Admission: pneumonia, hematuria   Discharge Date: 20 RARS: Readmission Risk Score: 15      Last Discharge [address]       Complaint Diagnosis Description Type Department Provider    20 Hematuria Pneumonia of right upper lobe due to infectious organism ([facility] [address] . .. ED to Hosp-Admission (Discharged) (ADMITTED) MARY Humphrey MD; Zehra Bobo No... Spoke with: Abdiaziz 36: Simona Morning      Non-face-to-face services provided:  Scheduled appointment with PCP-stated he has a call out to Dr. Timmons Client office for a follow up appt   Scheduled appointment with Specialist-Stated he has a call out Dr. Lara Kinney office for sooner follow up. Obtained and reviewed discharge summary and/or continuity of care documents    Care Transitions 24 Hour Call    Do you have any ongoing symptoms?:  No  Do you have a copy of your discharge instructions?:  Yes  Do you have all of your prescriptions and are they filled?:  Yes  Have you been contacted by a [facility] [place] [address]?:  No  Have you scheduled your follow up appointment?:  No  Were you discharged with any Home Care or Post Acute Services:  No  Do you feel like you have everything you need to keep you well at home?:  Yes  Care Transitions Interventions  No Identified Needs                             Spoke with Ivan Arroyo for initial BPCI care transition call post hospital discharge. Explained the role of Care Transition Nurse and the BPCI-A program, he is agreeable to follow up post discharge from the hospital.     Ivan Cruz denies any shortness of breath at rest or minimal activity and stated the only time he is slightly short of breath is when he goes up the steps to use the bathroom. He reports living in a split level home.  He denies any blood in his urine today. He stated he is holding the Xarelto at this time until instructed to resume by cardiology. Med review completed. CTN explained that a member of the Care Transition Central Team will be contacting him for further follow up calls, he is in agreement and denies any other needs or concerns at this time.      Follow Up  Future Appointments   Date Time Provider Chiquita Dean   3/23/2020 10:00 AM Jaqui Melo MD [facility]       Baylee Angulo RN

## 2020-02-13 NOTE — PROGRESS NOTES
Patient Active Problem List   Diagnosis    PFO (patent foramen ovale)    Diabetes mellitus type 2, uncontrolled ([facility] [address]    Essential hypertension    Hyperlipidemia LDL goal <100    Gross hematuria    Pneumonia due to infectious organism    Paroxysmal atrial fibrillation (HCC)    Chronic combined systolic and diastolic congestive heart failure (HCC)       Current Outpatient Medications   Medication Sig Dispense Refill    tamsulosin (FLOMAX) 0.4 MG capsule Take 1 capsule by mouth daily 30 capsule 0    isosorbide mononitrate (IMDUR) 30 MG extended release tablet Take 1 tablet by mouth daily 30 tablet 3    furosemide (LASIX) 40 MG tablet Take 0.5 tablets by mouth daily 60 tablet 3    atorvastatin (LIPITOR) 40 MG tablet Take 40 mg by mouth daily      digoxin (LANOXIN) 125 MCG tablet TAKE 1 TABLET BY MOUTH DAILY 90 tablet 3    metoprolol succinate (TOPROL XL) 25 MG extended release tablet Take 0.5 tablets by mouth 2 times daily 30 tablet 3    SPIRONOLACTONE PO Take 12.5 mg by mouth daily       sacubitril-valsartan (ENTRESTO) 24-26 MG per tablet Take 1 tablet by mouth 2 times daily      LEVEMIR FLEXTOUCH 100 UNIT/ML injection pen Inject 12 Units into the skin daily   12    docusate sodium (COLACE) 100 MG capsule Take 100 mg by mouth daily       sitagliptan-metformin (JANUMET)  MG per tablet Take 1 tablet by mouth 2 times daily (with meals).  rivaroxaban (XARELTO) 20 MG TABS tablet Take 1 tablet by mouth daily (with breakfast) Lot: [id number]  Exp: 11/21 (Patient not taking: Reported on 2/7/2020) 28 tablet 0     No current facility-administered medications for this visit. CC:    Patient is seen in follow up for:  1. Persistent atrial fibrillation    2. Chronic systolic CHF (congestive heart failure) ([facility] [address]    3. Essential hypertension    4. Hyperlipidemia LDL goal <100    5. NICM (nonischemic cardiomyopathy) ([facility] [address]    6.  LV dysfunction        HPI:  Misha Velasquez is seen in follow-up evaluation new

## 2020-02-14 NOTE — TELEPHONE ENCOUNTER
----- Message from Catina Mathew MD sent at 2/13/2020  5:13 PM EST -----  Please let patient's wife know that Dr. Tesfaye Drivers office should be calling to move up his cystoscopy.

## 2020-02-20 NOTE — H&P
well.        ALLERGIES: None     MEDICATIONS: Lipitor   Colace   Digoxin   Entresto   Isosorbide   Janumet   Lasix   Levemir   Metoprolol Succinate   Multivitamin   Probiotic   Spironolactone   Xarelto         PSH: Cystoscopy TURP, 8 GRAMS OF BPH - 2016 - at [address]. [place] - [id number]       NON- PSH: Back Surgery (Unspecified) - about 2014, 1994  Carpal Tunnel Surgery, Bilateral  Colon Resection  Colonoscopy  Eye Surgery (Unspecified)  Hand/finger Surgery, Right - 2016        PMH: Elevated prostate specific antigen [PSA] (Stable) - 2017  Benign prostatic hyperplasia with lower urinary tract symptoms (Stable), S/P TURP ON 11/22/16 - 2016  Cyst of kidney, acquired, Right  Dorsalgia, unspecified  Gross hematuria  Overactive bladder  Retention of urine, unspecified  Urge incontinence       NON- PMH: Cellulitis of unspecified part of limb, Right  Constipation, unspecified  Essential (primary) hypertension  Heart failure, unspecified  Legal blindness, as defined in [place], Bilateral  Pleural effusion, not elsewhere classified, Bilateral  Type 2 diabetes mellitus without complications  Unspecified hearing loss, unspecified ear       FAMILY HISTORY: Diabetes - Runs in Family  EYE - Runs in Family     SOCIAL HISTORY: Marital Status:   Preferred Language: English; Ethnicity: Not  Or ; Race: White  Current Smoking Status: Patient has never smoked. Does not use smokeless tobacco.  Drinks 1 drink per week. Does not use drugs. Drinks 2 caffeinated drinks per day. Has not had a blood transfusion. Patient's occupation is/was RETIRED [employer]. Notes: TWO CHILDREN     REVIEW OF SYSTEMS:     Constitutional:   Patient denies fever, chills, and weight loss. Eyes:   Patient reports wearing glasses. Ears, Nose, Mouth, Throat:   Patient reports hearing loss. Cardiovascular:   Patient reports irregular heartbeat. Patient denies chest pains and swollen ankles.    Respiratory: Normal tracheal position. Respiratory: No labored breathing, no use of accessory muscles. Cardiovascular: Abnormal heart rhythm. Normal temperature, normal extremity pulses, no swelling, no varicosities. Lymphatic: No enlargement of neck, axillae, groin. Skin: No paleness, no jaundice, no cyanosis. No lesion, no ulcer, no rash. Neurologic / Psychiatric: Oriented to time, oriented to place, oriented to person. No depression, no anxiety, no agitation. Gastrointestinal: Midline scar. No mass, no tenderness, no rigidity, non obese abdomen. Eyes: Normal conjunctivae. Normal eyelids. Ears, Nose, Mouth, and Throat: Slight hearing loss. Left ear no scars, no lesions, no masses. Right ear no scars, no lesions, no masses. Nose no scars, no lesions, no masses. Normal lips. WEARING BILATERAL HEARING AIDES   Musculoskeletal: Normal gait and station of head and neck. PAST DATA REVIEWED:   Source Of History:  Patient, Family/Caregiver   Lab Test Review:   PSA, Urine Cytology   Records Review:   Pathology Reports, Previous Patient Records   X-Ray Review: Ultrasound - Renal: Reviewed Report. Discussed With Patient. 02/01/18 11/22/16 04/22/15   PSA   Total PSA 3.53  6.06  2.74        PROCEDURES:          Flexible Cystoscopy - [address]   Risks, benefits, and some of the potential complications of the procedure were discussed at length with the patient including infection, bleeding, voiding discomfort, urinary retention, fever, chills, sepsis, and others. All questions were answered. Informed consent was obtained. Sterile technique was used. Meatus:  Normal size. Normal location. Normal condition. Urethra:  No strictures. External Sphincter:  Normal.   Verumontanum:  Normal.   Prostate:  Partially resected prostate fossa. Moderate prostatic regrowth. Non-obstructing. Mild hyperplasia. Bladder Neck:  Non-obstructing. Ureteral Orifices:  Normal location. Normal size. Normal shape.  Effluxed clear urine.   Bladder:  Erythematous mucosa. Moderate amount of clot. No trabeculation. No stones. The lower urinary tract was carefully examined. The procedure was well-tolerated and without complications. Instructions were given to call the office immediately for bloody urine, difficulty urinating, urinary retention, painful or frequent urination, fever, chills, nausea, vomiting or other illness. The patient stated that he understood these instructions and would comply with them. ASSESSMENT:       ICD-10 Details   1 :   Gross hematuria - R31.0 Stable   2   Benign prostatic hyperplasia with lower urinary tract symptoms - N40.1 Stable - S/P TURP ON 11/22/16   3   Elevated prostate specific antigen [PSA] - R97.20 Stable     PLAN:               Medications  Stop Meds: Tamsulosin Hcl 0.4 mg capsule 1 capsule PO Q HS   Discontinue: 02/19/2020  - Reason: The medication cycle was completed. Schedule  Return Visit/Planned Activity: Follow Up After Testing   Procedure: Approximately 1 Week at [address] [place] [address]. [place] - [id number] - Cystoscopy [facility] - [address], CLOT EVACUATION, POSSIBLE RESECTION AND FULGURATION OF PROSTATE GLAND, POSSIBLE NIÑO INSERTION, MAYRA  Notes: HOLD ANTICOAGULANTS PREOP. PSA WITH PREOP LABS         The patient and I talked at length about surgical treatment of BPH. Etiologies of BPH including advancing age, family history of BPH and others were discussed with the patient. Alternative medical, minimally invasive and surgical treatment options were discussed with the patient in detail. The risks, benefits, and some of the possible complications of surgical treatment options including TUMT, TUNA, TUIP, Laser ablation of the prostate, TURP and Suprapubic prostatectomy were discussed with the patient. All questions were answered. The patient gave fully informed consent to proceed with a TURP for the surgical therapy for their BPH.      The patient was given instructions to call for abdominal pain, pelvic pain, perirectal pain, nausea, vomiting, diarrhea, fever over 100? ?F, chills, hematuria, dysuria, frequency, urgency, or urge incontinence. He has clot in his bladder he has been unable to pass. He had hematuria from prostatic bleeding accentuated by Xarelto use. He will stay off anticoagulants for now, and will undergo cysto/clot evacuation as discussed ASAP. His PSA has been stable and will be repeated soon. A prostate MRI will be considered should the PSA rise drastically. He is to increase his oral fluids.

## 2020-02-21 NOTE — PROGRESS NOTES
[facility] PRE-ADMISSION TESTING GENERAL INSTRUCTIONS- [facility]-phone number:[phone]    GENERAL INSTRUCTIONS  [x] Antibacterial Soap shower Night before and/or AM of Surgery  [] Tono wipe instruction sheet and wipes given. [x] Nothing by mouth after midnight, including gum, candy, mints, or water.  [] You may brush your teeth, gargle, but do NOT swallow water. []Hibiclens shower  the night before and the morning of surgery. Do not use             Hibiclens on your face or head. [x]No smoking, chewing tobacco, illegal drugs, or alcohol within 24 hours of your surgery. [x] Jewelry, valuables or body piercing's should not be brought to the hospital. All body and/or tongue piercing's must be removed prior to arriving to hospital.  ALL hair pins must be removed. [x] Do not wear makeup, lotions, powders, deodorant. Nail polish as directed by the nurse. [x] Arrange transportation with a responsible adult  to and from the hospital. If you do not have a responsible adult  to transport you, you will need to make arrangements with a medical transportation company (i.e. [employer]. A Uber/taxi/bus is not appropriate unless you are accompanied by a responsible adult ). Arrange for someone to be with you for the remainder of the day and for 24 hours after your procedure due to having had anesthesia. WIFE  Who will be your  for transportation?___WIFE_______________   Who will be staying with you for 24 hrs after your procedure?_____WIFE_____________  [x] Bring insurance card and photo ID.  [] Transfusion Bracelet: Please bring with you to hospital, day of surgery  [] Bring urine specimen day of surgery. Any small container is acceptable. [] Use inhalers the morning of surgery and bring with you to hospital.  [] Bring copy of living will or healthcare power of  papers to be placed in your electronic record.   [] CPAP/BI-PAP: Please bring your machine if you are to spend the night in the hospital.     PARKING INSTRUCTIONS:   [x] Arrival Time:__1130___________  · [x] Parking lot '\"I\"  is located on [facility] (the corner of [facility] and [facility]). To enter, press the button and the gate will lift. A free token will be provided to exit the lot. One car per patient is allowed to park in this lot. All other cars are to park on [address] [place] [address] either in the parking garage or the handicap lot. [] To reach the [facility] lobby from [address] [place] [address], upon entering the hospital, take elevator B to the 3rd floor. EDUCATION INSTRUCTIONS:      [] Knee or hip replacement booklet & exercise pamphlets given. [] Tylor [address] placed in chart. [] Pre-admission Testing educational folder given  [] Incentive Spirometry,coughing & deep breathing exercises reviewed. []Medication information sheet(s)   []Fluoroscopy-Xray used in surgery reviewed with patient. Educational pamphlet placed in chart. []Pain: Post-op pain is normal and to be expected. You will be asked to rate your pain from 0-10(a zero is not acceptable-education is needed). Your post-op pain goal is:  [] Ask your nurse for your pain medication. [] Joint camp offered. [] Joint replacement booklets given. [] Other:___________________________    MEDICATION INSTRUCTIONS:   [x]Bring a complete list of your medications, please write the last time you took the medicine, give this list to the nurse. [x] Take the following medications the morning of surgery with 1-2 ounces of water: SEE MED LIST  [] Stop herbal supplements and vitamins 5 days before your surgery. [x] DO NOT take any diabetic medicine the morning of surgery. Follow instructions for insulin the day before surgery. [x] If you are diabetic and your blood sugar is low or you feel symptomatic, you may drink 1-2 ounces of apple juice or take a glucose tablet.   The morning of your procedure, you may call the pre-op area if you have concerns about your blood sugar [phone]. [] Use your inhalers the morning of surgery. Bring your emergency inhaler with you day of surgery. [x] Follow physician instructions regarding any blood thinners you may be taking. WHAT TO EXPECT:  [x] The day of surgery you will be greeted and checked in by the [facility].  In addition, you will be registered in the [facility] by a Patient Access Representative. Please bring your photo ID and insurance card. A nurse will greet you in accordance to the time you are needed in the pre-op area to prepare you for surgery. Please do not be discouraged if you are not greeted in the order you arrive as there are many variables that are involved in patient preparation. Your patience is greatly appreciated as you wait for your nurse. Please bring in items such as: books, magazines, newspapers, electronics, or any other items  to occupy your time in the waiting area. [x]  Delays may occur with surgery and staff will make a sincere effort to keep you informed of delays. If any delays occur with your procedure, we apologize ahead of time for your inconvenience as we recognize the value of your time.

## 2020-02-21 NOTE — PROGRESS NOTES
PATIENT NOT SURE OF HIS MEDS. SAID THEY ARE PREPACKAGED BY [facility]. I CALLED FOR LIST AND RECEIVED IT FROM [facility]. I TALKED TO PATIENT'S WIFE AND TOLD HER PATIENT NEEDS TO TAKE DIGOXIN, ISOSORBIDE AND METOPROLOL AM OF OR IF THESE ARE AM MEDS. SHE WILL HAVE [facility] PACKAGE THESE FOR AM OF OR. NO WATER PILLS, NO DIABETES MEDS. NO ENTRESTO ETC. AM OF OR.

## 2020-02-27 NOTE — OP NOTE
bleeding and friability. Panendoscopic evaluation of the bladder showed no stones tumors or foreign bodies. Both ureteral orifice ease were in the usual position on the trigone and there was clear reflux of urine from each. There was a large clot within the bladder. A piston syringe was obtained and the clot was evacuated from the bladder completely and discarded. Endoscopic evaluation of the bladder showed no residual clots at this time. A 5 Burkinan olive-tipped catheter was inserted through the scope and into the left ureteral orifice. 10 cc of contrast was instilled and a left retrograde pyelogram was performed using intraoperative fluoroscopy. The left ureter was normal in size and caliber throughout its course. There were no stones filling defect strictures or abnormalities. Left pelvic calyceal system showed no hydronephrosis filling defects or abnormalities. The catheter was removed and the system drained freely. The catheter was inserted into the right ureteral orifice. Again 10 cc of contrast was instilled. A right retrograde pyelogram was performed using intraoperative fluoroscopy. The right ureter was also normal in size and caliber throughout its course without stones filling defect strictures or abnormalities. The right pelvic calyceal system showed no hydronephrosis filling defects or abnormalities. The catheter was removed and the system drained freely. Sterile saline irrigation was utilized. The cystoscope was removed. A 27 Burkinan continuous-flow resectoscope sheath with a [place] obturator was passed per urethra and into the bladder. A 12 degree lens plasma loop resectoscope was inserted. Transurethral resection of the residual nodular prostate tissue from the lateral lobes were resected. This was performed from the base of the prostate to the apex. There is no evidence of middle lobe.   Prostate tissue was resected down to the capsule without injury to the ureteral orifice

## 2020-02-27 NOTE — H&P
Respiratory:   Patient denies shortness of breath, wheezing, and chronic cough. Gastrointestinal:   Patient denies abdominal pain, nausea/vomiting, and change in bowels. Genitourinary:   Patient reports blood in urine. Patient denies incontinence, painful urination, martel catheter, and suprapubic catheter. Musculoskeletal:   Patient denies using cane, using walker, and using wheelchair. Integumentary/Skin:   Patient denies skin cancer history, persistent itching, and rash. Neurological:   Patient denies numbness, tingling, dizziness, and altered mental status. Hematologic/Lymphatic:   Patient denies swollen glands, abnormal bleeding, and history blood transfusion.      Notes: Updated from previous visit 11/04/2019 with review from patient as noted above.      VITAL SIGNS:         Weight 160 lb / 72.57 kg   Height 72 in / 182.88 cm   Pulse 74 /min   Resp. Rate 18 /min   BMI 21.7 kg/m²       PHYSICAL EXAMINATION:     Scrotum: No lesions. No edema. No cysts. No warts. Epididymides: Right: no spermatocele, no masses, no cysts, no tenderness, no induration, no enlargement. Left: no spermatocele, no masses, no cysts, no tenderness, no induration, no enlargement. Testes: No tenderness, no swelling, no enlargement left testes. No tenderness, no swelling, no enlargement right testes. Normal location left testes. Normal location right testes. No mass, no cyst, no varicocele, no hydrocele left testes. No mass, no cyst, no varicocele, no hydrocele right testes. Urethral Meatus: Normal size. No lesion, no wart, no discharge, no polyp. Normal location. Penis: Circumcised, no warts, no cracks. No dorsal Peyronie's plaques, no left corporal Peyronie's plaques, no right corporal Peyronie's plaques, no scarring, no warts. No balanitis, no meatal stenosis.      MULTI-SYSTEM PHYSICAL EXAMINATION:     Constitutional: Well-nourished. No physical deformities. Normally developed. Good grooming.    Neck: Neck

## 2020-02-27 NOTE — ANESTHESIA PRE PROCEDURE
Department of Anesthesiology  Preprocedure Note       Name:  Fernanda Kayser   Age:  67 y.o.  :  1940                                          MRN:  [id number]         Date:  2020      Surgeon: Rachel Nj):  Lucretia Doherty MD    Procedure: CYSTOSCOPY RETROGRADE PYELOGRAM CLOT EVACUATION,POSS. RESECTION AND FULGERATION OF PROSTATE GLAND POSS NIÑO INSERTION, DIGITAL RECTAL EXAM (N/A )    Medications prior to admission:   Prior to Admission medications    Medication Sig Start Date End Date Taking? Authorizing Provider   metoprolol succinate (TOPROL XL) 25 MG extended release tablet Take 0.5 tablets by mouth 2 times daily 20  Yes Samm Morrison MD   tamsulosin [facility]) 0.4 MG capsule Take 1 capsule by mouth daily 20   Darlyn Davis MD   rivaroxaban (XARELTO) 20 MG TABS tablet Take 1 tablet by mouth daily (with breakfast) Lot: [id number]  Exp:   Patient not taking: Reported on 2020   Eusebio Chaney DO   isosorbide mononitrate (IMDUR) 30 MG extended release tablet Take 1 tablet by mouth daily 20   Divina Bone MD   furosemide (LASIX) 40 MG tablet Take 0.5 tablets by mouth daily 2/3/20   Divina Bone MD   atorvastatin (LIPITOR) 40 MG tablet Take 40 mg by mouth daily    Historical Provider, MD   digoxin (LANOXIN) 125 MCG tablet TAKE 1 TABLET BY MOUTH DAILY 20   Katie Kebede MD   SPIRONOLACTONE PO Take 12.5 mg by mouth daily     Historical Provider, MD   sacubitril-valsartan (ENTRESTO) 24-26 MG per tablet Take 1 tablet by mouth 2 times daily    Historical Provider, MD   LEVEMIR FLEXTOUCH 100 UNIT/ML injection pen Inject 12 Units into the skin daily  18   Historical Provider, MD   docusate sodium (COLACE) 100 MG capsule Take 100 mg by mouth daily     Historical Provider, MD   sitagliptan-metformin (JANUMET)  MG per tablet Take 1 tablet by mouth 2 times daily (with meals).       Historical Provider, MD       Current medications:    Current Facility-Administered Medications   Medication Dose Route Frequency Provider Last Rate Last Dose    0.9 % sodium chloride infusion   Intravenous Continuous Shaw Castanon MD        sodium chloride flush 0.9 % injection 10 mL  10 mL Intravenous 2 times per day Gabriel Muse MD        sodium chloride flush 0.9 % injection 10 mL  10 mL Intravenous PRN Gabriel Muse MD        ceFAZolin (ANCEF) 2 g in dextrose 3 % 50 mL IVPB (duplex)  2 g Intravenous On Call to [address] [place] [address], MD           Allergies:  No Known Allergies    Problem List:    Patient Active Problem List   Diagnosis Code    PFO (patent foramen ovale) Q21.1    Diabetes mellitus type 2, uncontrolled ([address] E11.65    Essential hypertension I10    Hyperlipidemia LDL goal <100 E78.5    Gross hematuria R31.0    Pneumonia due to infectious organism J18.9    Paroxysmal atrial fibrillation (HCC) I48.0    Chronic combined systolic and diastolic congestive heart failure (HCC) I50.42       Past Medical History:        Diagnosis Date    Atrial [facility]     CHF (congestive heart failure) [facility]     Diabetes mellitus ([facility]     Hypertension     Legal blindness     peripheral vison intact       Past Surgical History:        Procedure Laterality Date    BACK SURGERY      CARPAL TUNNEL RELEASE      EYE SURGERY  05/2018    KNEE SURGERY      PROSTATE SURGERY      SKIN CANCER EXCISION  04/2019    TURP  11/22/2016       Social History:    Social History     Tobacco Use    Smoking status: Never Smoker    Smokeless tobacco: Never Used   Substance Use Topics    Alcohol use: Yes     Comment: occassionally                                Counseling given: Not Answered      Vital Signs (Current):   Vitals:    02/21/20 0922 02/27/20 1124 02/27/20 1128   BP:  (!) 108/56    Pulse:  74    Resp:  20    Temp:  36.6 °C (97.9 °F)    TempSrc:  Temporal    SpO2:  (!) 81% 100%   Weight: 171 lb (77.6 kg) 171 lb (77.6 kg)    Height: 5' at home, on 4L O2 in pre-op d/t low SpO2. Cardiovascular:    (+) hypertension:, CHF: systolic, hyperlipidemia      ECG reviewed  Rhythm: regular    Echocardiogram reviewed  Stress test reviewed       Beta Blocker:  Dose within 24 Hrs      ROS comment: NICM. PFO. Neuro/Psych:   Negative Neuro/Psych ROS              GI/Hepatic/Renal: Neg GI/Hepatic/Renal ROS            Endo/Other:    (+) DiabetesType II DM, using insulin, : arthritis (No meds. ): OA., .                 Abdominal:           Vascular: negative vascular ROS. Transthoracic Echocardiography Report (TTE)      Demographics      Patient Name    Sherry Dwyer  Gender            Male                   H      Medical Record  [id number]    Room Number       2996   Number      Account #       [de-identified]   Procedure Date    01/12/2020      Corporate ID                Ordering          Samuel Calzada MD                               Physician      Accession       [id number]   Referring         Adrián Siddiqui MD   Number                      Physician      Date of Birth   1940  Sonographer       Kvng Oden [facility]      Age             67 year(s)  Interpreting      [address]                               Physician         Physician Cardiology                                                 Cely Mohan                                  Any Other     Procedure     Type of Study      TTE procedure     Procedure Date  Date: 01/12/2020 Start: 07:45 AM     Study Location: Echo Lab  Technical Quality: Good visualization     Indications:Shortness of breath.     Patient Status: Routine     Height: 70 inches Weight: 160 pounds BSA: 1.9 m^2 BMI: 22.96 kg/m^2     BP: 102/57 mmHg      Findings      Left Ventricle   Concentric hypertrophy. PWd is 1.6 cm. Severe left ventricular systolic dysfunction. Visually estimated LVEF is 30 %. Severe global hypokinesis.    Diastolic pattern consistent with atrial fibrillation. Right Ventricle   Dilated right ventricle. RVEDD is 4.8 cm. Mild right ventricular systolic dysfunction. TDI s' is 9 cm/s. TAPSE is 1.1 cm. Left Atrium   Moderately dilated left atrium based on JEN. No left to right shunting with color Doppler. Right Atrium   Normal right atrium. Mitral Valve   Apically tethered mitral valve leaflets, posterior >> anterior. Moderate 2-3 + mitral regurgitation with eccentric posteriorly directed   jet. MR ERO is 0.2 cm2. MR RV 26 ml.   MR rad is 0.8 cm. Tricuspid Valve   Mild tricuspid regurgitation. Central regurgitant jet. Aortic Valve   Trileaflet aortic valve. Mild leaflet calcification. Normal leaflet mobility. No aortic stenosis. No aortic regurgitation. Pulmonic Valve   Mild pulmonic regurgitation. Pericardial Effusion   No evidence for hemodynamically significant pericardial effusion. Pleural Effusion   No evidence of pleural effusion. Aorta   Normal aortic root and ascending aorta. Miscellaneous   The inferior vena cava diameter is normal with normal respiratory   variation. Conclusions      Summary   Concentric hypertrophy. PWd is 1.6 cm. Severe left ventricular systolic dysfunction. Visually estimated LVEF is 30 %. Severe global hypokinesis. Diastolic pattern consistent with atrial fibrillation. Dilated right ventricle. RVEDD is 4.8 cm. Mild right ventricular systolic dysfunction. Apically tethered mitral valve leaflets, posterior >> anterior. Moderate 2-3 + mitral regurgitation with eccentric posteriorly directed   jet. MR ERO is 0.2 cm2. MR RV 26 ml.   MR rad is 0.8 cm. Mild tricuspid regurgitation. Mild pulmonic regurgitation. When compared to imaging completed in 2015 the LVEF has decreased from 60   % to 30 % and with development of secondary valvular regurgitation.       Anesthesia Plan      MAC     ASA 4     (L FA #20G)  Induction: intravenous. Anesthetic plan and risks discussed with patient (Pt and pt's wife communicated and verbalized understanding, denies any further questions. ). Use of blood products discussed with patient and spouse whom consented to blood products. Plan discussed with CRNA and attending. Floyd Perla RN   2/27/2020      Pt seen, examined, chart reviewed, plan discussed. Ronaldo Lea  2/27/2020  12:16 PM     DOS STAFF ADDENDUM:    Patient seen and chart reviewed. Physical exam and history updated as indicated. NPO status confirmed. Anesthesia options and plan discussed including risks benefits with patient/legal guardian and family as available. Concerns and questions addressed. Consent verbalized to proceed.   Anesthesia plan, options and intraoperative/postoperative concerns discussed with care team.    John Noyola MD  2/27/2020  2:33 PM

## 2020-02-28 NOTE — ANESTHESIA POSTPROCEDURE EVALUATION
Department of Anesthesiology  Postprocedure Note    Patient: Brando Gaspar  MRN: [id number]  YOB: 1940  Date of evaluation: 2/28/2020  Time:  10:49 AM     Procedure Summary     Date:  02/27/20 Room / Location:  [facility] OR 06 / [facility]    Anesthesia Start:  7322 Anesthesia Stop:  7489    Procedure:  CYSTOSCOPY RETROGRADE PYELOGRAM CLOT EVACUATIOn,  RESECTION  OF PROSTATE GLAND WITH NIÑO INSERTION (N/A ) Diagnosis:  (HEMATURIA, BPH)    Surgeon:  Mitali Starkey MD Responsible Provider:  Yanni Desouza MD    Anesthesia Type:  MAC ASA Status:  4          Anesthesia Type: MAC    Maria Del Carmen Phase I: Maria Del Carmen Score: 10    Maria Del Carmen Phase II: Maria Del Carmen Score: 10    Last vitals: Reviewed and per EMR flowsheets.        Anesthesia Post Evaluation    Patient location during evaluation: PACU  Patient participation: complete - patient participated  Level of consciousness: awake and alert  Airway patency: patent  Nausea & Vomiting: no nausea and no vomiting  Complications: no  Cardiovascular status: hemodynamically stable and blood pressure returned to baseline  Respiratory status: acceptable  Hydration status: euvolemic

## 2020-02-29 NOTE — ED PROVIDER NOTES
MODERATE (A) Negative   Microscopic Urinalysis   Result Value Ref Range    WBC, UA 5-10 0 - 5 /HPF    RBC, UA >20 0 - 2 /HPF    Bacteria, UA RARE (A) None Seen /HPF   CBC auto differential   Result Value Ref Range    WBC 12.8 (H) 4.5 - 11.5 E9/L    RBC 3.85 3.80 - 5.80 E12/L    Hemoglobin 12.6 12.5 - 16.5 g/dL    Hematocrit 37.2 37.0 - 54.0 %    MCV 96.6 80.0 - 99.9 fL    MCH 32.7 26.0 - 35.0 pg    MCHC 33.9 32.0 - 34.5 %    RDW 13.9 11.5 - 15.0 fL    Platelets 159 447 - 335 E9/L    MPV 11.8 7.0 - 12.0 fL   APTT   Result Value Ref Range    aPTT 27.8 24.5 - 35.1 sec   Protime-INR   Result Value Ref Range    Protime 14.2 (H) 9.3 - 12.4 sec    INR 1.3    POCT Glucose   Result Value Ref Range    Meter Glucose 123 (H) 74 - 99 mg/dL     Imaging: All Radiology results interpreted by Radiologist unless otherwise noted. No orders to display     ED Course / Medical Decision Making     Medications   0.9 % sodium chloride bolus (has no administration in time range)   cefTRIAXone (ROCEPHIN) 1 g in sterile water 10 mL IV syringe (1 g Intravenous New Bag 2/29/20 1822)   ondansetron (ZOFRAN) injection 4 mg (4 mg Intravenous Given 2/29/20 1822)     ED Course as of Feb 29 1845   Sat Feb 29, 2020   1744 Dr. Gonzales Whitman into examine patient and wife is demanding we call Dr. Magdy Valles.    [SE]      ED Course User Index  [SE] Bob White, APRN - CNP     Re Evaluation:  7819 reevaluated he is drinking ginger ale at bedside and discussed that Dr. Asad Thrasher would like him to take it easy and to call the office Monday to make sure he has an appointment. Telma Cruz 78    Consultations:         IP CONSULT TO UROLOGY 1805 Discussed patient with Dr. Asad Thrasher and he does not want the antibiotic changed to Keflex and remain thinks that this bleeding is reactionary  and wants the patient to take it easy until his appointment on Monday and can be discharged.     Procedures:   none    MDM:   Patient had a urinalysis which revealed positive nitrates and 5-10 WBCs rare 6:47 PM      START taking these medications    Details   ondansetron (ZOFRAN ODT) 4 MG disintegrating tablet Take 1 tablet by mouth every 8 hours as needed for Nausea or Vomiting, Disp-10 tablet, R-0Print           MD Maeve Grande MD  04/01/20 1128

## 2020-03-01 NOTE — ED NOTES
Bladder irrigated with 3000 cc of normal saline which all returned in the martel. Pt tolerated well. Discharged to home with martel catheter which is draining well.       Ambrocio Bennett RN  02/29/20 [address], RN  02/29/20 9165

## 2020-03-03 NOTE — TELEPHONE ENCOUNTER
Patient called and stated he saw Dr. Alec Frias today and they took his catheter out and he was told he can restart the Xarelto after Friday. Is this ok with you that this is the medication he is started back on.       (If so he will need samples  )

## 2020-03-05 PROBLEM — J96.02 ACUTE RESPIRATORY FAILURE WITH HYPOXIA AND HYPERCAPNIA (HCC): Status: ACTIVE | Noted: 2020-01-01

## 2020-03-05 PROBLEM — I63.9 CEREBROVASCULAR ACCIDENT (CVA) (HCC): Status: ACTIVE | Noted: 2020-01-01

## 2020-03-05 PROBLEM — J96.01 ACUTE RESPIRATORY FAILURE WITH HYPOXIA AND HYPERCAPNIA (HCC): Status: ACTIVE | Noted: 2020-01-01

## 2020-03-05 NOTE — PROGRESS NOTES
Occupational Therapy    OT consult received to eval/treat and chart review complete. Patient on hold. Not following commands per RN. OT to re-attempt at a later time. Thank you.        Marcos Spann, OTR/L  [id number]

## 2020-03-05 NOTE — ED NOTES
Several attempts made to drop OG by multiple nurses. ED physician aware.       Naya Evangelista, RN  03/05/20 0863

## 2020-03-05 NOTE — CONSULTS
Palliative Care Department  Palliative Care Initial Consult  Provider: [facility] days prior to Consult: Hospital Day: 1    Referring Provider:  PAULINO Bartholomew    Reason for Consult:  [x]  Code status Discussion  [x]  Assist with goals of care  []  Psychosocial support  []  Symptom Management      Chief Complaint: Altered mental status    Subjective:     HPI:  Tay De La Garza is a 67 y.o. male with significant past medical history of hypertension, congestive heart failure, patent foramen ovale, diabetes mellitus, atrial fibrillation (previously on Xarelto recently held due to hematuria), BPH, legally blind who presented to the emergency department with altered mental status. Patient was found with mental status changes at home by his wife. She reportedly called EMS and during this time he fell off the bed and his legs started shaking. He was transported to the emergency department via EMS. Work-up completed revealing CVA, aspiration pneumonia with acute respiratory failure, altered mental status. He was intubated in the emergency department and admitted to the critical care unit. CT revealed right occipital hypodensity. CTA revealed bilateral PCA occlusion. Neurology was consulted, and concluded patient likely had a CVA followed by a general tonic-clonic seizure per history obtained from wife. Keppra ordered and brain MRI and EEG ordered as well. Patient's wife has left the hospital currently. His daughter-in-law is at bedside. Family has been updated by neurosurgery and neurology in regards to the current condition of the patient and the plan. MRI is planned this afternoon at 1500. Patient's daughter-in-law Griselda at bedside revealed patient has had a slow decline over the last year and his overall performance status. He was still independent for all ADLs however tired more easily and was sleeping more specially since his last hospitalization.   He has been legally

## 2020-03-05 NOTE — ED PROVIDER NOTES
Department of Emergency Medicine   ED  Provider Note  Admit Date/RoomTime: 3/5/2020  2:09 AM  ED Room: 22/22          History of Present Illness:  3/5/20, Time: 2:14 AM      Unable to obtain a complete HPI due to this patient's mental status. Marla Lopez is a 67 y.o. male presenting to the ED for altered mental status, beginning prior to arrivak. The complaint has been constant, severe in severity, and worsened by nothing. Pt comes to the ED by EMS from home. Pt was found on the floor by wife unresponsive. Upon EMS arrival, pt's glucose was 70 and he was given D10. Pt has remained altered. Unknown LKW at this time. Review of Systems:   Unable to obtain a complete ROS due to the patients mental status.         --------------------------------------------- PAST HISTORY ---------------------------------------------  Past Medical History:  has a past medical history of Atrial fibrillation ([facility] CHF (congestive heart failure) ([facility] Diabetes mellitus ([facility] Hypertension, and Legal blindness. Past Surgical History:  has a past surgical history that includes knee surgery; Carpal tunnel release; back surgery; TURP (11/22/2016); Eye surgery (05/2018); Skin cancer excision (04/2019); Prostate surgery; and Cystoscopy (N/A, 2/27/2020). Social History:  reports that he has never smoked. He has never used smokeless tobacco. He reports current alcohol use. He reports that he does not use drugs. Family History: family history includes Cancer in his sister; Stroke in his mother. The patients home medications have been reviewed. Allergies: Patient has no known allergies.       ---------------------------------------------------PHYSICAL EXAM--------------------------------------    Constitutional/General: Patient nonverbal and not following commands  Head: Normocephalic and atraumatic  Eyes: PERRL  Mouth: Oropharynx noted mucus unable to handle secretions  Neck: Supple  Respiratory: Lungs clear to minutes of critical care time, excluding separately billable procedures. Consultations:            Speak to Dr. Manuel Paz him he will admit. Also spoke to neurology see above. Patient is not a TPA candidate. Patient may be a interventional candidate call was placed to interventional radiologist Dr. Sarah Gusman awaiting callback. Speak to Dr. Sarah Gusman he will speak to the neurologist Chiquita Escobar and determine whether patient is a candidate for intervention            Counseling: The emergency provider has spoken with the  and discussed todays results, in addition to providing specific details for the plan of care and counseling regarding the diagnosis and prognosis. Questions are answered at this time and they are agreeable with the plan.       --------------------------------- IMPRESSION AND DISPOSITION ---------------------------------    IMPRESSION  1. Acute respiratory failure, unspecified whether with hypoxia or hypercapnia ([address]    2. Altered mental status, unspecified altered mental status type    3. Cerebrovascular accident (CVA), unspecified mechanism ([address]    4. Aspiration pneumonia, unspecified aspiration pneumonia type, unspecified laterality, unspecified part of lung ([address]        DISPOSITION  Disposition: Admit to CCU/ICU  Patient condition is serious    SCRIBE ATTESTATION  3/5/20, 2:14 AM.    This note is prepared by Alba Serna acting as Scribe for Dax Turner MD.    Dax Turner MD:  The scribe's documentation has been prepared under my direction and personally reviewed by me in its entirety. I confirm that the note above accurately reflects all work, treatment, procedures, and medical decision making performed by me.              Dax Turner MD  03/05/20 1449       Dax Turner MD  03/05/20 9703       Dax Turner MD  03/05/20 7913

## 2020-03-05 NOTE — PROGRESS NOTES
[facility] Intensive Care Unit  Critical Care Consult 3/5/2020    Date of Admission: 03/05/2020    CC: Follow-up for stroke. HOSPITAL COURSE/OVERNIGHT EVENTS:  03/05  Presented to the ED with AMS via EMS. At home, found by wife unresponsive. EMS glucose 70-given D10. LKW 10 pm last evening. .  Initial /89. Unable to obtain NIHSS. Stroke alert called. HCT shows subacute right parietal infarct. Not a candidate for TPA due to LKW time. IR to assess for thrombectomy. Intubated. Admitted to NSICU. On Xarelto for Afib. Developed hematuria. Xarelto stopped. Recently underwent cystoscopy & resection of prostate. Has not restarted Xarelto. PMH:   Past Medical History:   Diagnosis Date    [facility]     CHF (congestive heart failure) ([facility]     Diabetes mellitus ([facility]     Hypertension     Legal blindness     peripheral vison intact     PSH:   Past Surgical History:   Procedure Laterality Date    BACK SURGERY      CARPAL TUNNEL RELEASE      CYSTOSCOPY N/A 2/27/2020    CYSTOSCOPY RETROGRADE PYELOGRAM CLOT EVACUATIOn,  RESECTION  OF PROSTATE GLAND WITH NIÑO INSERTION performed by Norma Hernandez MD at [address] [place] [address]  05/2018    KNEE SURGERY      PROSTATE SURGERY      SKIN CANCER EXCISION  04/2019    TURP  11/22/2016     Home Medications:   Prior to Admission medications    Medication Sig Start Date End Date Taking?  Authorizing Provider   ondansetron (ZOFRAN ODT) 4 MG disintegrating tablet Take 1 tablet by mouth every 8 hours as needed for Nausea or Vomiting 2/29/20 2/28/21  Ramses Later, APRN - CNP   ibuprofen (ADVIL;MOTRIN) 800 MG tablet Take 1 tablet by mouth every 8 hours as needed for Pain 2/27/20   Norma Hernandez MD   phenazopyridine (PYRIDIUM) 100 MG tablet Take 1 tablet by mouth 3 times daily as needed for Pain 2/27/20 2/26/21  Norma Hernandez MD   isosorbide mononitrate (IMDUR) 30 MG extended release tablet Take 1 tablet by mouth daily 2/4/20   Eduardo Mena MD   furosemide (LASIX) 40 MG tablet Take 0.5 tablets by mouth daily 2/3/20   Eduardo Mena MD   atorvastatin (LIPITOR) 40 MG tablet Take 40 mg by mouth daily    Historical Provider, MD   digoxin (LANOXIN) 125 MCG tablet TAKE 1 TABLET BY MOUTH DAILY 1/23/20   Mike Melendez MD   metoprolol succinate (TOPROL XL) 25 MG extended release tablet Take 0.5 tablets by mouth 2 times daily 1/14/20   Apolinar Boss MD   SPIRONOLACTONE PO Take 12.5 mg by mouth daily     Historical Provider, MD   sacubitril-valsartan (ENTRESTO) 24-26 MG per tablet Take 1 tablet by mouth 2 times daily    Historical Provider, MD   LEVEMIR FLEXTOUCH 100 UNIT/ML injection pen Inject 12 Units into the skin daily  5/12/18   Historical Provider, MD   docusate sodium (COLACE) 100 MG capsule Take 100 mg by mouth daily     Historical Provider, MD   sitagliptan-metformin (JANUMET)  MG per tablet Take 1 tablet by mouth 2 times daily (with meals).       Historical Provider, MD     Allergies: No Known Allergies    Social History:  Social History     Socioeconomic History    Marital status:      Spouse name: Not on file    Number of children: Not on file    Years of education: Not on file    Highest education level: Not on file   Occupational History    Not on file   Social Needs    Financial resource strain: Not on file    Food insecurity:     Worry: Not on file     Inability: Not on file    Transportation needs:     Medical: Not on file     Non-medical: Not on file   Tobacco Use    Smoking status: Never Smoker    Smokeless tobacco: Never Used   Substance and Sexual Activity    Alcohol use: Yes     Comment: occassionally    Drug use: No    Sexual activity: Not on file   Lifestyle    Physical activity:     Days per week: Not on file     Minutes per session: Not on file    Stress: Not on file   Relationships    Social connections:     Talks on phone: Not on file     Gets together: Not on

## 2020-03-05 NOTE — PROGRESS NOTES
[facility]  SURGICAL INTENSIVE CARE UNIT (SICU)  ATTENDING PHYSICIAN CRITICAL CARE PROGRESS NOTE     I have examined the patient, reviewed the record,and discussed the case with the APN/  Resident. I have reviewed all relevant labs and imaging data. The following summarizes my clinical findings and independent assessment. Date of admission:  3/5/2020    CC: CVA     HOSPITAL COURSE:   03/05  Presented to the ED with AMS via EMS. At home, found by wife unresponsive. EMS glucose 70-given D10. LKW 10 pm last evening. .  Initial /89. Unable to obtain NIHSS. Stroke alert called. HCT shows subacute right parietal infarct. Not a candidate for TPA due to LKW time. IR to assess for thrombectomy ( not a candidate) . Intubated. Admitted to NSICU. On Xarelto for Afib. Developed hematuria. Xarelto stopped. Recently underwent cystoscopy & prostatectomy on 2/27. Has not restarted Alvera Earnest was due to start the day after admission for his CVA . Hx EF 35%       New Imaging Reviewed:   CT head- Right parietal infarct   CTA neck- no significant stenosis  Perfussion-   Small perfusion defect at the right occipital lobe corresponding to    hypodensity on CT, probable small subacute ischemic infarct. Chest Xray - right upper lobe infiltrate , ETT in good position      Physical Exam:   Physical Exam  Constitutional:       Comments: somnolent    HENT:      Head: Normocephalic. Comments: Facial ecchymosis  Eyes:      Extraocular Movements: Extraocular movements intact. Pupils: Pupils are equal, round, and reactive to light. Pulmonary:      Effort: Pulmonary effort is normal.      Comments: vented  Abdominal:      General: Abdomen is flat. There is no distension. Skin:     General: Skin is warm. Neurological:      Comments: Weakly withdrawing all extremities .  Not following commands         Assessment   Active Problems:    Cerebrovascular accident (CVA) ([address]    Acute respiratory failure with hypoxia and hypercapnia (HCC)  Resolved Problems:    * No resolved hospital problems. *      Plan   GI: TFs, Senna Dulcolax  hold home zofran QTc 534   Neuro: prop ggt/fent prn Neurology following, Keppra (EEG pending) MRI brain pending will add Cervical spine as fell and can not clear. Subacture right parietal CVA  no TPA no thrombectomy  , Holding home ibuprofen   Renal: 80cc NS, 500cc bolus , Monitor Urine Output, Daily CBC,BMP, Mg,Phos, ionized Ca Hold lasix   Musculoskeletal: WBAT all extremities , place cervical collar AM-PAC Score when able   Pulmonary: Aggressive pulmonary hygiene , Chest Xray Daily ABG Daily  Mechanical Ventilation  Mode: AC   VT:500 ( decrease to 450)   Rate:16  PEEP:5  FiO2: 40 PF ratio  223 , Monitor RR and Maintain SpO2 > 92%  ID:   Received unasyn and rocephin x1 in ED will continue unasyn as likely has aspiration PNA present on admission   Heme: No indication for Transfusion   Cardiac: Monitor Hemodynamics Hx a FIB with a PFO, on Xarelto but has been held for hematuria and prostatectomy , lipator Hold imdur, entresto, reorder digoxin, Metoprolol IV with hold orders , troponin 0.05  Consult cardiology Last Echo EF 30-35%  Endocrine: SSI , hold long acting insulin  Hold janumet     DVT Prophylaxis: PCDs, prophylaxis once ok with neurology   Ulcer Prophylaxis: H2 Intubated for >48 hours   Tubes and Lines: Continue PIV, Continue [place] for critical care management , Continue ETT and Continue NGT/OGT   Seizure proph:     Keppra  Ancillary consults:  Cardiology ,   Internal Medicine , Palliative Care, PT/OT  and Neurology   Family Update:         As available   CODE Status:       Full Code    Dispo: JAQUAN Juarez MD    Critical Care: 36 minutes evaluating and managing patient with Respiratory Failure , Risk of neurological decompensation, and At risk for further deterioration and death.

## 2020-03-06 NOTE — PROGRESS NOTES
Deepali Rivera is a 67 y.o. right handed male     Neurology is following for stroke    PMH: HTN, hx Afib---OAC (Xarelto) held d/t hematuria,DM2, CHF, legal blindness    Patient presented to ED on 3/5/2020 for altered mental status. Patient was found on the floor by his wife unresponsive--moaning and twitching all over with foaming at the mouth; no incontinence this lasted for more than 5 to 10 minutes   Upon EMS arrival patient's blood glucose was 70 and patient was given D10. Patient remained altered. Last known well is unknown, due to this patient was not a TPA candidate. Patient was intubated due to respiratory compromise. Initial head CT completed 3/5 0308am was negative for hemorrhage however right occipital lobe infarct was suspected and MRI was recommended. CT C-spine was negative for fracture. CTA head and neck revealed mild stenosis of the proximal internal carotid arteries bilaterally less than 50% with no other significant stenosis or occlusions. Incidental finding of moderate to large bilateral pleural effusions were noted in addition to airspace consolidation that may represent infiltrates or pneumonia. CT perfusion showed small right occipital lobe hypodensity suspicious for subacute infarct   MRI brain completed later on 3/5 showed acute or early subacute bilateral ophthalmic lacunar infarcts and left mesial temporal lobe infarct consistent with bilateral PCA territory infarcts. Retrospective evaluation of CT does demonstrate high-grade stenosis of P2 segments of the posterior cerebral arteries. MRI C-spine completed was negative for fracture, joint dislocation, cord injury or ligamentous injury. Degenerative changes resulting in impingement of the ventral cord at multiple levels and severe central canal stenosis at C3-4 and C4-5 with moderate stenosis at C5-6. Multilevel neuroforaminal stenosis, worse (moderate) at bilateral C3-4 and C4-5 levels.    Complete echo completed 1/12 was consistent with atrial fibrillation and ejection fraction of 30%. EEG showed moderate to severe encephalopathy; no seizures noted    Repeat CT head showed additional area of stroke--now including R mesial temporal lobe    Patient has no history of seizures in the past    After coming back from an MRI yesterday evening patient became severely hypotensive, bradycardic and systolic BP in the 17J--[id number] was started on Levophed with some improvements. Patient has low-grade fevers present otherwise vitals stable on ventilator--- levo fed and propofol in place    Patient's wife and son are at bedside currently. Objective:     BP (!) 86/49   Pulse 62   Temp 99.3 °F (37.4 °C) (Bladder)   Resp 16   Wt 184 lb 8 oz (83.7 kg)   SpO2 99%   BMI 26.47 kg/m²     General appearance: Intubated, sedation, eyes closed and not following commands, no obvious distress  Head: normocephalic, without obvious abnormality, atraumatic  Eyes: conjunctivae/corneas clear.  .  Neck: supple, symmetrical, trachea midline and thyroid not enlarged  Lungs: Coarse-rhonchi, diminished to auscultation bilaterally  Heart: irregular rate and rhythm, A. fib with occasional PVCs on telemetry   Extremities: normal, atraumatic, no cyanosis, generalized edema +1-2  Pulses: 2+ and symmetric  Skin: color, texture, turgor normal---no rashes or lesions      Mental Status: Intubated, sedated on propofol, not following commands, eyes remain closed    Appropriate attention/concentration--- unable to assess due to intubation and sedation  Intact fundus of knowledge--- unable to assess due to intubation and sedation  Intact memories--- unable to assess due to intubation and sedation    Speech/Language: --- unable to assess due to intubation and sedation    Cranial Nerves: CN 2-12 exam limited due to intubation and sedation  I: smell NA   II: visual acuity  NA   II: visual fields  keeps eyes close, chronic vision loss (blind) bilaterally   II: pupils pupils equal 4 mm each sluggish   III,VII: ptosis None   III,IV,VI: extraocular muscles   pupils midline no gaze preference noted; doll's eyes present   V: mastication    V: facial light touch sensation  No response to LT   V,VII: corneal reflex  Present   VII: facial muscle function - upper  Normal   VII: facial muscle function - lower  appears symmetric--although limited due to intubation   VIII: hearing  chronic hearing loss; no response to loud claps   IX: soft palate elevation     IX,X: gag reflex Present   XI: trapezius strength     XI: sternocleidomastoid strength    XI: neck extension strength   turns neck   XII: tongue strength       Motor:   Withdraws all to pain  Increased tone, normal bulk  No abnormal movements noted    Sensory:  Withdraws and grimaces in all to pain    Coordination:   Unable to complete due to patient's condition    Gait:  Unable to complete due to patient's condition    DTR:   Right Brachioradialis reflex 1+  Left Brachioradialis reflex 1+  Right Biceps reflex 1+  Left Biceps reflex 1+  Right Triceps reflex 1+  Left Triceps reflex 1+  Right Quadriceps reflex 1+  Left Quadriceps reflex 1+  Right Achilles reflex 0  Left Achilles reflex 0    b/l Babinskis  No Billingsley's    No pathological reflexes    Laboratory/Radiology:     CBC:   Lab Results   Component Value Date    WBC 7.8 03/06/2020    RBC 3.57 03/06/2020    HGB 11.4 03/06/2020    HCT 34.7 03/06/2020    MCV 97.2 03/06/2020    MCH 31.9 03/06/2020    MCHC 32.9 03/06/2020    RDW 13.4 03/06/2020     03/06/2020    MPV 11.6 03/06/2020     CMP:    Lab Results   Component Value Date     03/06/2020    K 3.9 03/06/2020    K 3.9 02/05/2020     03/06/2020    CO2 17 03/06/2020    BUN 14 03/06/2020    CREATININE 0.9 03/06/2020    GFRAA >60 03/06/2020    LABGLOM >60 03/06/2020    GLUCOSE 143 03/06/2020    PROT 5.4 03/06/2020    LABALBU 3.0 03/06/2020    CALCIUM 8.6 03/06/2020    BILITOT 0.3 03/06/2020    ALKPHOS 58 03/06/2020 systolic dysfunction. Visually estimated LVEF is 30 %. Severe global hypokinesis. Diastolic pattern consistent with atrial fibrillation. Dilated right ventricle. RVEDD is 4.8 cm. Mild right ventricular systolic dysfunction. Apically tethered mitral valve leaflets, posterior >> anterior. Moderate 2-3 + mitral regurgitation with eccentric posteriorly directed jet. MR ERO is 0.2 cm2. MR RV 26 ml.   MR rad is 0.8 cm. Mild tricuspid regurgitation. Mild pulmonic regurgitation. When compared to imaging completed in 2015 the LVEF has decreased from 60   % to 30 % and with development of secondary valvular regurgitation. EEG 3/6: This EEG shows a moderate to severe diffuse encephalopathy. No epileptiform activity or lateralizing signs are seen. All labs and imaging reviewed independently today. Assessment:     Patient presented to ED after being found unresponsive seemingly a general tonic-clonic seizure with a history of A. fib currently off Xarelto for hematuria. Patient found to have bilateral PCA strokes. EEG consistent with moderate to severe diffuse encephalopathy with no seizure activity noted   Repeat CT head showed additional area of stroke; no hemorrhage    OAC should be resumed as soon as okay with urology    Chest x-ray today shows infiltrates are slightly worse than prior study. Patient currently on Unasyn    Low CO2, mildly elevated chloride, hypomagnesemia, elevated anion gap and anemia also present. At present patient remains in neuro ICU intubated on propofol, not following commands and keeping eyes closed consistently. MRI brain images reviewed with patient's wife and son at bedside. All questions and concerns addressed. Went over plan of care in addition to reasons for pending testing including CT head and EEG ordered this morning.     Plan:     Continue supportive care in ICU   Frequent neuro checks  From neuro perspective patient should be resumed on [address] [facility]

## 2020-03-06 NOTE — CONSULTS
Inpatient Cardiology Consultation      Reason for Consult:  Low EF. Admitted with CVA. Bradycardia. Known AF    Consulting Physician: Dr Lisset Moura    Requesting Physician:  Anthony BOB    Date of Consultation: 3/6/2020    HISTORY OF PRESENT ILLNESS: 66 yo caucsaian male known to  [employer]. PMH: PFO, NSVT, nonischemic cardiomyopathy/LV dysfunction, HTN, DM, NSVT, HLD, CHF, and he was recently diagnosed with Atrial Fibrillation (01/10/2020) and was placed on [facility] with Xarelto at that time, due to hematuria Xarelto has been on hold since 2/2/2020. [facility]ED 3/5/2020 please see ED records for events leading to admission as patient is intubated/sedated with no family at bedside. BP upon arrival 112/89 HR 80's-120's AF RVR. Blood sugar upon arrival 70 received 1 amp D50 IV. LNW 2200 so did not receive tPA. CTA brain-->bilateral pca strokes. Intubated in ED for airway protection. Na 140, K+ 4.1-->3.9, Bun/Cr 20/1.1-->14/0.9, magnesium 1.2, lactic acid 2.0, troponin 0.05 x 2, Albumin 3.0, dig level 0.8, Hgb 12.1-->11.4. BC x 2. According to wife-->twitching all over and foaming at mouth. 3/5/2020 -->came back from MRI with severely hypotensive  went bradycardic and SBP was in the 60's. Sedation on pause,  1.5L Saline given and patient started on levophed with improvement MAP >65 SBP low 100's. Bedside US done, no free fluid in the abdomen, subxiphoid cardiac window with severe left  ventricular systolic function   0/6/5888-->H radial mirza inserted    Please note: past medical records were reviewed per electronic medical record (EMR) - see detailed reports under Past Medical/ Surgical History. Past Medical/Surgical History:    1. HTN  2. HLD  3. T2DM insulin requiring  4. 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 TURP 11/2016, bilateral carpal tunnel release, bilateral cataract surgery, skin carcinoma excision, knee surgery, back surgery. 6. BPH  7. Chronic LBBB  8.  Chronic HFpEF  9. TTE: 6/23/2015-mild LVH, EF 60%, indeterminate diastolic function, aneurysmal intra-atrial septum, mild MR, trace TR, RVSP 29 mmHg. 10. TTE: 10/15/2016-EF approximately 45%, E/E prime ratio is > 15 (25) dictating a marked elevation of left atrial pressure, stage II diastolic dysfunction, normal right ventricular size and systolic function, TA PSE is 21 mm consistent with normal global RV function, there is a possible PFO with left-to-right shunting, trace MI  11. Lexiscan stress test: 10/17/2016 EF 27%, mild global hypokinesis, no evidence of exercised stress-induced ischemia  12. TTE 11/21/2017 [facility]): Moderate concentric LVH. EF 20-25%. Moderate global hypokinesis. Stage III Diastolic Dysfunction. Akinesis of the basal inferoseptal and basal inferior myocardium. Small pericardial effusion anterior to the heart. No shunt seen. 13. [facility] [place] MPS: 11/22/2017 imaging demonstrates a small perfusion abnormality of mild intensity located in the inferior wall.  The defects is fixed.  The study results are consistent with a nonischemic cardiomyopathy.  EF 24%. 14. New onset of AF with RVR (captured on EKG 1/9/2020, duration unknown). 15. [id number] score at least 5 (Age, HTN, DM, HF). Xarelto initiated   16. Echocardiogram 01/12/2020 (Dr. Donald Saleem): Concentric hypertrophy. PWd is 1.6 cm. Severe left ventricular systolic dysfunction. Visually estimated LVEF is 30 %. Severe global hypokinesis. Diastolic pattern consistent with atrial fibrillation. Dilated right ventricle. RVEDD is 4.8 cm. Mild right ventricular systolic dysfunction. Apically tethered mitral valve leaflets, posterior >> anterior. Moderate 2-3 + mitral regurgitation with eccentric posteriorly directed jet. MR ERO is 0.2 cm2. MR RV 26 ml. MR rad is 0.8 cm. Mild tricuspid regurgitation. Mild pulmonic regurgitation.  When compared to imaging completed in 2015 the LVEF has decreased from 60 % to 30 % and with development of secondary valvular regurgitation. [facility] 2/3/2020: EF 18%. Dilated LV . Global hypokinesis. small area of mildly reversible ischemia at the inferolateral apical wall without a clear corresponding segmental wall motion abnormality in this globally hypokinetic left ventricle. This reduces the positive predictive value of the perfusion findings. 18. 2/27/2020: Cystourethroscopy, clot evacuation, bilateral retrograde pyelography, transurethral plasma loop resection of the prostate gland, [place] insertion, rectal exam  19. 3/3/2020: [place] removed told by  Urolognavdeep could resume [facility] on 3/6/2020. Medications Prior to admit:  Prior to Admission medications    Medication Sig Start Date End Date Taking?  Authorizing Provider   ondansetron (ZOFRAN ODT) 4 MG disintegrating tablet Take 1 tablet by mouth every 8 hours as needed for Nausea or Vomiting 2/29/20 2/28/21  Lawanda Ahr, APRN - CNP   ibuprofen (ADVIL;MOTRIN) 800 MG tablet Take 1 tablet by mouth every 8 hours as needed for Pain 2/27/20   Darling Harris MD   phenazopyridine (PYRIDIUM) 100 MG tablet Take 1 tablet by mouth 3 times daily as needed for Pain 2/27/20 2/26/21  Darling Harris MD   isosorbide mononitrate (IMDUR) 30 MG extended release tablet Take 1 tablet by mouth daily 2/4/20   Ramyond Ochoa MD   furosemide (LASIX) 40 MG tablet Take 0.5 tablets by mouth daily 2/3/20   Raymond Ochoa MD   atorvastatin (LIPITOR) 40 MG tablet Take 40 mg by mouth daily    Historical Provider, MD   digoxin (LANOXIN) 125 MCG tablet TAKE 1 TABLET BY MOUTH DAILY 1/23/20   Samantha Whitfield MD   metoprolol succinate (TOPROL XL) 25 MG extended release tablet Take 0.5 tablets by mouth 2 times daily 1/14/20   Aleksandr Diamond MD   SPIRONOLACTONE PO Take 12.5 mg by mouth daily     Historical Provider, MD   sacubitril-valsartan (ENTRESTO) 24-26 MG per tablet Take 1 tablet by mouth 2 times daily    Historical Provider, MD   LEVEMIR FLEXTOUCH 100 UNIT/ML injection pen Inject 12 Units into the skin daily  5/12/18   Historical Provider, MD   docusate sodium (COLACE) 100 MG capsule Take 100 mg by mouth daily     Historical Provider, MD   sitagliptan-metformin (JANUMET)  MG per tablet Take 1 tablet by mouth 2 times daily (with meals).       Historical Provider, MD       Current Medications:    Current Facility-Administered Medications: sodium chloride flush 0.9 % injection 10 mL, 10 mL, Intravenous, 2 times per day  sodium chloride flush 0.9 % injection 10 mL, 10 mL, Intravenous, PRN  acetaminophen (TYLENOL) tablet 650 mg, 650 mg, Per NG tube, Q4H PRN **OR** acetaminophen (TYLENOL) suppository 650 mg, 650 mg, Rectal, Q4H PRN  labetalol (NORMODYNE;TRANDATE) injection 10 mg, 10 mg, Intravenous, Q10 Min PRN  propofol injection, 10 mcg/kg/min, Intravenous, Titrated  glucose (GLUTOSE) 40 % oral gel 15 g, 15 g, Oral, PRN  dextrose 50 % IV solution, 12.5 g, Intravenous, PRN  glucagon (rDNA) injection 1 mg, 1 mg, Intramuscular, PRN  dextrose 5 % solution, 100 mL/hr, Intravenous, PRN  chlorhexidine (PERIDEX) 0.12 % solution 15 mL, 15 mL, Mouth/Throat, BID  fentaNYL (SUBLIMAZE) injection 25 mcg, 25 mcg, Intravenous, Q1H PRN  insulin lispro (HUMALOG) injection vial 0-18 Units, 0-18 Units, Subcutaneous, Q4H  atorvastatin (LIPITOR) tablet 40 mg, 40 mg, Per NG tube, Nightly  levetiracetam (KEPPRA) 500 mg/100 mL IVPB, 500 mg, Intravenous, BID  polyvinyl alcohol (LIQUIFILM TEARS) 1.4 % ophthalmic solution 1 drop, 1 drop, Both Eyes, Q4H PRN  0.9 % sodium chloride infusion, , Intravenous, Continuous  ampicillin-sulbactam (UNASYN) 3 g ivpb minibag, 3 g, Intravenous, Q6H  sennosides (SENOKOT) 8.8 MG/5ML syrup 5 mL, 5 mL, Per NG tube, Nightly  polyethylene glycol (GLYCOLAX) packet 17 g, 17 g, Per NG tube, Daily  digoxin (LANOXIN) injection 125 mcg, 125 mcg, Intravenous, Daily  metoprolol (LOPRESSOR) injection 2.5 mg, 2.5 mg, Intravenous, Q6H  pantoprazole (PROTONIX) injection 40 mg, filed at 3/6/2020 0804  Gross per 24 hour   Intake 2492 ml   Output 547 ml   Net 1945 ml       Labs:   CBC:   Recent Labs     03/05/20 0226 03/06/20  0425   WBC 7.3 7.8   HGB 12.1* 11.4*   HCT 35.9* 34.7*    182     BMP:   Recent Labs     03/05/20 0226 03/06/20  0425    142   K 4.1 3.9   CO2 23 17*   BUN 20 14   CREATININE 1.1 0.9   LABGLOM >60 >60   CALCIUM 9.3 8.6     Mag:   Recent Labs     03/06/20  0425   MG 1.2*     Phos:   Recent Labs     03/06/20  0425   PHOS 2.7     HgA1c:   Lab Results   Component Value Date    LABA1C 6.5 (H) 02/04/2020     CARDIAC ENZYMES:  Recent Labs     03/05/20 0226 03/05/20  1715   TROPONINI 0.05* 0.05*     FASTING LIPID PANEL:  Lab Results   Component Value Date    CHOL 65 03/06/2020    HDL 26 03/06/2020    LDLCALC 16 03/06/2020    TRIG 115 03/06/2020     LIVER PROFILE:  Recent Labs     03/05/20 0226 03/06/20  0425   AST 19 18   ALT 10 13   LABALBU 3.2* 3.0*   A&P per Dr Simran Roberts  Electronically signed by Aniceto Thomas. ABDI Jain on 3/6/2020 at 10:26 AM     ________________________________________________________________________________________  I independently interviewed and examined the patient. I have reviewed the above documentation completed by the ARTIE. Please see my additional contributions to the HPI, physical exam, and assessment / medical decision making. HPI, ROS, PMH, PSH, FMH, SH, and medications independently reviewed (agree; see above documentation)    History of Present Illness:  Admitted for further evaluation of CVA. He's been off [address] d/t hematuria. Currently sedated on vent. On levophed. Rate controlled atrial fibrillation on telemetry.     Review of Systems:   Unable to obtain (sedated, on vent)    Physical Exam:  BP (!) 86/49   Pulse 63   Temp 99 °F (37.2 °C) (Bladder)   Resp 16   Wt 184 lb 8 oz (83.7 kg)   SpO2 100%   BMI 26.47 kg/m²   Wt Readings from Last 3 Encounters:   03/06/20 184 lb 8 oz (83.7 kg)   02/29/20 170 lb (77.1 kg)   02/27/20 171 lb (77.6 kg)     Appearance: Sedated, on vent  Skin: Intact, no rash  Head: Normocephalic, atraumatic  ENMT: +ETT, no rhinorrhea  Neck: Supple, no carotid bruits  Lungs: Decreased BS B/L, no wheezing  Cardiac: IRRR, 2/6 systolic murmur  Abdomen: Soft, +bowel sounds  Extremities: Moves all extremities x 4, no lower extremity edema  Neurologic: Unable to assess (sedated)    Cardiac Tests:  Telemetry: rate controlled atrial fibrillation    Echocardiogram: 1/2020 (Dr. Becca Hightower)   Concentric hypertrophy. PWd is 1.6 cm. Severe left ventricular systolic dysfunction. Visually estimated LVEF is 30 %. Severe global hypokinesis. Diastolic pattern consistent with atrial fibrillation. Dilated right ventricle. RVEDD is 4.8 cm. Mild right ventricular systolic dysfunction. Apically tethered mitral valve leaflets, posterior >> anterior. Moderate 2-3 + mitral regurgitation with eccentric posteriorly directed   jet. MR ERO is 0.2 cm2. MR RV 26 ml.   MR rad is 0.8 cm. Mild tricuspid regurgitation. Mild pulmonic regurgitation. When compared to imaging completed in 2015 the LVEF has decreased from 60   % to 30 % and with development of secondary valvular regurgitation. [facility] nuclear stress test: 2/2020  1. Perfusion imaging suggests a small area of mildly reversible   ischemia at the inferolateral apical wall without a clear   corresponding segmental wall motion abnormality in this globally   hypokinetic left ventricle. This reduces the positive predictive value   of the perfusion findings. 2. Ejection fraction is 18 %. 3. Dilated LV. Global hypokinesis. Impression/Plan:  1. CVA (recently off [address] d/t hematuria)  2. Persistent atrial fibrillation -- rate controlled  3. Acute respiratory failure -- intubated  4. Chronic HFrEF  5. Moderate mitral regurgitation  6. Chronic LBBB  7. History of HTN  8. HLD  9. DM -- on insulin  10. Anemia -- most recent Hgb 11.4  11.  History of hematuria -- follows with urology    - Wean pressors as able  - Resume GDMT once hemodynamics stable off pressors  - Resume anticoagulation once ok with neurology/urology  - Monitor telemetry  - Prior cardiac studies reviewed today  - Care per neurology and ICU team    Thank you for allowing me to participate in your patient's care. Please feel free to contact me if you have any questions or concerns.     Tayler Reynoso MD  [facility]) Cardiology

## 2020-03-06 NOTE — PROCEDURES
Brandon Moore is a 67 y.o. male patient. 1. Acute respiratory failure, unspecified whether with hypoxia or hypercapnia ([facility] [address] [facility]    2. Altered mental status, unspecified altered mental status type    3. Cerebrovascular accident (CVA), unspecified mechanism ([facility] [address]    4. Aspiration pneumonia, unspecified aspiration pneumonia type, unspecified laterality, unspecified part of lung ([facility] [address]    5. Acute respiratory failure with hypoxia and hypercapnia (HCC)      Past Medical History:   Diagnosis Date    Atrial fibrillation (HCC)     CHF (congestive heart failure) (HCC)     Diabetes mellitus ([facility] [address]     Hypertension     Legal blindness     peripheral vison intact     Blood pressure 128/70, pulse 83, temperature 96.4 °F (35.8 °C), temperature source Bladder, resp. rate 18, weight 180 lb 12.4 oz (82 kg), SpO2 99 %. Central Line  Date/Time: 3/5/2020 9:12 PM  Performed by: Maru Weber MD  Authorized by: Wendy Lieberman MD   Consent: Written consent obtained. Risks and benefits: risks, benefits and alternatives were discussed  Consent given by: power of   Patient identity confirmed: arm band  Indications: vascular access  Anesthesia: local infiltration    Anesthesia:  Local Anesthetic: lidocaine 1% without epinephrine  Anesthetic total: 3 mL    Sedation:  Patient sedated: yes  Sedatives: propofol  Vitals: Vital signs were monitored during sedation.     Preparation: skin prepped with 2% chlorhexidine  Skin prep agent dried: skin prep agent completely dried prior to procedure  Sterile barriers: all five maximum sterile barriers used - cap, mask, sterile gown, sterile gloves, and large sterile sheet  Hand hygiene: hand hygiene performed prior to central venous catheter insertion  Location details: left subclavian  Patient position: Trendelenburg  Catheter type: triple lumen  Catheter size: 7 Fr  Pre-procedure: landmarks identified  Number of attempts: 1  Successful placement: yes  Post-procedure: line sutured and dressing applied  Assessment: blood return through all ports,  free fluid flow,  placement verified by x-ray and no pneumothorax on x-ray  Patient tolerance: Patient tolerated the procedure well with no immediate complications          Lew Ramesh MD  3/5/2020

## 2020-03-06 NOTE — PROCEDURES
EEG Report  Anthony Lewis is a 67 y.o. male      Appointment Date 3/6/2020   Appointment Time 1:30PM   Facility Location yz EEG Number [id number]   Type of Study Routine portable Floor 4417     Technical Specifications  Technician [facility] of consciousness unresponsive   Sleep deprived? no   Hyperventilation tested? no   Photic stim tested? no   EEG recording Standard 10-20 electrode placement    Duration of recording 30 mins   EEG complete?  Yes       Clinical History   Sz    Medications    Current Facility-Administered Medications:     magnesium sulfate 4 g in 100 mL IVPB premix, 4 g, Intravenous, Once, Kennieth Marjan, APRN - CNS, Last Rate: 25 mL/hr at 03/06/20 1124, 4 g at 03/06/20 1124    sodium chloride flush 0.9 % injection 10 mL, 10 mL, Intravenous, 2 times per day, Kennieth Marjan, APRN - CNS, 10 mL at 03/06/20 0848    sodium chloride flush 0.9 % injection 10 mL, 10 mL, Intravenous, PRN, Kennieth Marjan, APRN - CNS    acetaminophen (TYLENOL) tablet 650 mg, 650 mg, Per NG tube, Q4H PRN **OR** acetaminophen (TYLENOL) suppository 650 mg, 650 mg, Rectal, Q4H PRN, Kennieth Marjan, APRN - CNS    labetalol (NORMODYNE;TRANDATE) injection 10 mg, 10 mg, Intravenous, Q10 Min PRN, Kennieth Marjan, APRN - CNS    propofol injection, 10 mcg/kg/min, Intravenous, Titrated, Kennieth Marjan, APRN - CNS, Last Rate: 5.9 mL/hr at 03/06/20 1333, 12 mcg/kg/min at 03/06/20 1333    glucose (GLUTOSE) 40 % oral gel 15 g, 15 g, Oral, PRN, Kennieth Marjan, APRN - CNS    dextrose 50 % IV solution, 12.5 g, Intravenous, PRN, Kennieth Marjan, APRN - CNS    glucagon (rDNA) injection 1 mg, 1 mg, Intramuscular, PRN, Kennieth Marjan, APRN - CNS    dextrose 5 % solution, 100 mL/hr, Intravenous, PRN, Kennieth Marjan, APRN - CNS    chlorhexidine (PERIDEX) 0.12 % solution 15 mL, 15 mL, Mouth/Throat, BID, Di Phillip, APRN - CNS, 15 mL at 03/06/20 0829    fentaNYL (SUBLIMAZE) injection 25 mcg, 25 mcg, Intravenous, Q1H PRN, Caleb Cain

## 2020-03-06 NOTE — TELEPHONE ENCOUNTER
Acute illness visit note    Impression:   · Left suppurative otitis media    Plan:   · Amoxicillin as prescribed below  · Flu shot given today. We discussed the vaccine and potential side effects  · I stressed the importance of a well-child exam in near future. I told dad that it would be okay for him to bring the child in if he could make it work with his schedule.    Follow-up:   Return in about 3 weeks (around 3/17/2017) for Well Exam.    Order Details:  Orders Placed This Encounter   • Influenza Quadrivalent Split Pres Free 0.5 mL ([id number])   • amoxicillin (AMOXIL) 400 MG/5ML suspension     Sig: Take 18.5 mLs by mouth 2 times daily for 10 days. Round dose to nearest 0.5mL.  Dispense Q.S.     Dispense:  1 mL     Refill:  0       Associated diagnoses for this encounter:  1. Acute suppurative otitis media of left ear without spontaneous rupture of tympanic membrane, recurrence not specified    2. Need for vaccination       _____________________________________________________________    Chief Complaint   Patient presents with   • Cough     X 4 days    • Ear Problem     X 4 days    .    HPI: Khushi Lee is a 6 year old female who presents for evaluation of ear pain and cough. The patient has had some bad cough and posttussive emesis since Monday, February 20. There was fever on the first night of the illness. Now she has left ear pain.    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  3/24/2015: Starting speech therapy.     • Eczema 11/20/2013     Priority: Medium   • Routine child health maintenance 07/27/2013     Priority: Low       ALLERGIES:   Allergen Reactions   • No Known Allergies      Medications were reviewed at the  Spoke with patient let them know samples are ready time of the encounter.    Physical Exam  Vitals:  Visit Vitals   • /62   • Pulse 120   • Temp 97.5 °F (36.4 °C) (Temporal Artery)   • Ht 3' 9\" (1.143 m)   • Wt (!) 32.7 kg   • BMI 25.07 kg/m2   ·   · Gen:   The patient is Alert, active, and in no acute distress.  · Eyes:   Conjunctivae clear bilaterally  · Ears:   Left TM red and bulging and Right TM Normal  · Ear canals:  Normal bilaterally  · Oropharynx:  Normal  · Neck:   Supple  · Lungs:   Clear to auscultation   Respiratory effort normal  · Heart:   Regular rate and rhythm without murmur

## 2020-03-06 NOTE — H&P
Lito Garcia is an 67 y.o.  male. Patient admitted unresponsive with an acute CVA.     Past Medical History:   Diagnosis Date    Atrial fibrillation ([facility] [address]     CHF (congestive heart failure) ([facility] [address]     Diabetes mellitus ([facility] [address]     Hypertension     Legal blindness     peripheral vison intact       Past Surgical History:   Procedure Laterality Date    BACK SURGERY      CARPAL TUNNEL RELEASE      CYSTOSCOPY N/A 2/27/2020    CYSTOSCOPY RETROGRADE PYELOGRAM CLOT EVACUATIOn,  RESECTION  OF PROSTATE GLAND WITH NIÑO INSERTION performed by Kishan Doe MD at [address] [place] [address]  05/2018    KNEE SURGERY      PROSTATE SURGERY      SKIN CANCER EXCISION  04/2019    TURP  11/22/2016       Family History   Problem Relation Age of Onset    Stroke Mother     Cancer Sister        Social History     Tobacco Use    Smoking status: Never Smoker    Smokeless tobacco: Never Used   Substance Use Topics    Alcohol use: Yes     Comment: occassionally    Drug use: No       Current Facility-Administered Medications   Medication Dose Route Frequency Provider Last Rate Last Dose    sodium chloride flush 0.9 % injection 10 mL  10 mL Intravenous 2 times per day Lorenda So, APRN - CNS   10 mL at 03/05/20 2200    sodium chloride flush 0.9 % injection 10 mL  10 mL Intravenous PRN Lorenda So, APRN - CNS        acetaminophen (TYLENOL) tablet 650 mg  650 mg Per NG tube Q4H PRN Lorenda So, APRN - CNS        Or    acetaminophen (TYLENOL) suppository 650 mg  650 mg Rectal Q4H PRN Lorenda So, APRN - CNS        labetalol (NORMODYNE;TRANDATE) injection 10 mg  10 mg Intravenous Q10 Min PRN Lorenda So, APRN - CNS        propofol injection  10 mcg/kg/min Intravenous Titrated Lorenda So, APRN - CNS 4.9 mL/hr at 03/05/20 2001 10 mcg/kg/min at 03/05/20 2001    glucose (GLUTOSE) 40 % oral gel 15 g  15 g Oral PRN Lorenda So, APRN - CNS        dextrose 50 % IV solution  12.5 g Intravenous PRN

## 2020-03-06 NOTE — PROGRESS NOTES
Occupational Therapy  OT consult received to eval/treat and chart review complete. Patient on hold. Not following commands per RN. OT to re-attempt at a later time. Thank you.      Samantha Liu OTR/L [id number]

## 2020-03-06 NOTE — CARE COORDINATION
3/6/2020 Care Coordination: Introduced myself to wife and son and daughter-in-law. Explained role of CM to family. Explained that CM/SW will follow along with patient and family and assist with discharge planning at appropriate time. Family states understanding. Will follow.  Adeola Tanner RN CM

## 2020-03-06 NOTE — PROGRESS NOTES
[facility] Intensive Care Unit  Critical Care  Daily Progress Note 3/6/2020    Date of Admission: 03/05/2020     CC: Follow-up for stroke.     HOSPITAL COURSE/OVERNIGHT EVENTS:  03/05  Presented to the ED with AMS via EMS. At home, found by wife unresponsive. EMS glucose 70-given D10. LKW 10 pm last evening. .  Initial /89. Unable to obtain NIHSS. Stroke alert called. HCT shows subacute right parietal infarct. Not a candidate for TPA due to LKW time. IR to assess for thrombectomy. Intubated. Admitted to NSICU. On Xarelto for Afib. Developed hematuria. Xarelto stopped. Recently underwent cystoscopy & resection of prostate. Has not restarted Xarelto. Following MRI yesterday developed Atrial fib with slow ventricular rate with hypotension. Given I liter of NS. Levophed initiated. Central line & arterial line placed. MRI cervical spine negative. MRI brain   03/06  Afebrile. Remains on Levophed & propofol. .      PHYSICAL EXAM:  BP (!) 86/49   Pulse 64   Temp 99.3 °F (37.4 °C) (Bladder)   Resp 16   Wt 184 lb 8 oz (83.7 kg)   SpO2 98%   BMI 26.47 kg/m²     Intake/Output Summary (Last 24 hours) at 3/6/2020 1031  Last data filed at 3/6/2020 0804  Gross per 24 hour   Intake 2492 ml   Output 547 ml   Net 1945 ml     General appearance:  Comfortable. NEUROLOGIC:   RASS Score:  - 1. GCS:  6T.   1 - Does not open eyes   4 - Moves part of body but does not remove noxious stimulus  1 - Makes no noise - intubated. Pupil size:  Left 4 mm  Right 4 mm. Gaze deviated downward to the right. Pupil reaction: Yes   PERRLA  Wiggles fingers: Left   No  Right No  Hand grasp:   Left: No     Right    No  Wiggles toes: Left   No Right  No  Plantar flexion: Left  No  Right   No    CONSTITUTIONAL: No acute distress, lying in hospital bed. CARDIOVASCULAR: S1 S2, regular rate, regular rhythm, no murmur/gallop/rub. Monitor: Afib.     Nicom:    CO 6.1 - 6.7 L  CI 3.0 - 3.4 L  SVI 45 - 50  PULMONARY: Bilaterally course. No rhonchi/rales/wheezes, no use of accessory muscles. Intubated. Mechanical ventilation:  VT:450 . Mode: AC. Rate:16 bpm.  FiO2: 40%. Peep 5 cm. . PF ratio: 319  Secretions: minimal obtained with suctioning. RENAL: [place] to gravity, clear yellow urine. Fluid balance for previous 24 hours:  + 1.9 L. ABDOMEN: Soft, nontender, nondistended, nontympanic, normal bowel sounds. NGT. Clamped. No reported vomiting. MUSCULOSKELETAL:  Brisk withdraw to apin on RUE & RLE. Slight withdraw to pain on LUE & LLE.    SKIN/EXTREMITIES: No rashes/ecchymosis, no edema/clubbing, warm/dry, good capillary refill. LINES:   Peripheral     Arterial line: Right radial.Day 2. Good waveform. Central line Left subclavian. Day 2. 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 *    Neuro:  Acute ischemic vertebrobasilar artery thalamic stroke. Acute ischemic VBA thalamic stroke right. Seizures. AMS. Hx of being legally blind. Monitor neuro status. Neurology following. Stroke education. Stroke protocol. Keppra. EEG pending. CV:  Afib. EF 30%.   Hx of HTN, AFib &

## 2020-03-07 NOTE — PROGRESS NOTES
[facility] Intensive Care Unit  Critical Care  Daily Progress Note 3/7/2020    Date of Admission: 03/05/2020     CC: Follow-up for stroke.     HOSPITAL COURSE/OVERNIGHT EVENTS:  03/05  Presented to the ED with AMS via EMS. At home, found by wife unresponsive. EMS glucose 70-given D10. LKW 10 pm last evening. .  Initial /89. Unable to obtain NIHSS. Stroke alert called. HCT shows subacute right parietal infarct. Not a candidate for TPA due to LKW time. IR to assess for thrombectomy. Intubated. Admitted to NSICU. On Xarelto for Afib. Developed hematuria. Xarelto stopped. Recently underwent cystoscopy & resection of prostate. Has not restarted Xarelto. Following MRI yesterday developed Atrial fib with slow ventricular rate with hypotension. Given I liter of NS. Levophed initiated. Central line & arterial line placed. MRI cervical spine negative. MRI brain showed subacute bilateral thalamic lacunar infarcts & left mesial temporal lobe infarction. 03/06  Afebrile. Remains on Levophed & propofol. HCT this am showed new right mesial temporal lobe infarction. Heparin started. 03/07  No issues overnight. Required 3 units of insulin. Propofol & Heparin continue. PHYSICAL EXAM:  BP (!) 147/59   Pulse 60   Temp 97.9 °F (36.6 °C) (Bladder)   Resp 16   Ht 5' 10\" (1.778 m)   Wt 174 lb 9.6 oz (79.2 kg)   SpO2 98%   BMI 25.05 kg/m²     Intake/Output Summary (Last 24 hours) at 3/7/2020 1003  Last data filed at 3/7/2020 0900  Gross per 24 hour   Intake 2095.73 ml   Output 1700 ml   Net 395.73 ml     General appearance:  Comfortable. NEUROLOGIC:   RASS Score:  - 1. GCS:  6T.   1 - Does not open eyes   4 - Moves part of body but does not remove noxious stimulus  1 - Makes no noise - intubated. Pupil size:  Left 4 mm  Right 4 mm. Gaze deviated downward to the right.     Pupil reaction: Yes   PERMARCELINO Riccigglindy fingers: Left   No  Right No  Hand grasp:   Left: No     Right vertebrobasilar artery thalamic stroke. Acute ischemic VBA thalamic stroke right. Seizures. AMS. Hx of being legally blind. Monitor neuro status. Neurology following. Stroke education. Stroke protocol. Keppra. EEG pending. CV:  Afib. EF 30%. Hx of HTN, AFib & CHF. Monitor hemodynamics. BP goal MAP > 65 mm Hg & less than 220 mm Hg. PRN hydralzine & labetalol. Levophed. Metoprolol. Lanoxin. Hold Imdur, Entresto, Lasix,     Pulm:  Acute respiratory failure              Intubated. Mechanical ventilation. Attempt PS trial.                Wean as tolerated. Pulmonary toilet. Monitor RR & SpO2. Daily CXR & ABG. GI: Inability to eat. Monitor bowel function. NPO              NGT. Clamped. TF on hold due to Levophed. Tigan only antiemetic with prolong QT. Will order if nausea/vomiting occurs. Renal:  Hematuria. Recent cystoscopy with prostate resection. Monitor BUN & Cr,  electrolytes & replace as needed. Monitor I & O. [place]. ID: No acute issues. Possible UTI/aspiration pneumonia. Unasyn Day 3. Endocrine:   Hyperglycemia. Hx of diabetes                Monitor BS.                ISS.     MSK: Deconditioned.               ROM. Turn & reposition. PT & OT when able. Monitor for skin breakdown. Heme: No acute issues. Monitor CBC.       Bowel regime: none  Pain control/Sedation: Propofol. Fentanyl. Tylenol. DVT prophylaxis: SCDs. No Lovenox/heparin at this time. GI prophylaxis: . Protonix. TF.   Glucose protocol:  ISS  Mouth/Eye care: Artificial tears. Peridex. [place]: Keep in place for critical care monitoring of fluid balance. Ancillary consults:  Neurology. Medicine. Palliative medicine. Cardiology. Patient/Family update:  Family in room. Questions answered. Support given. Code status:  Full  Disposition:  NSICU.       Electronically signed by Antonio Sandoval RN MSN APRN-NP [facility] NP  CCNS CCRN 3/7/2020 10:03 AM

## 2020-03-07 NOTE — PROGRESS NOTES
[facility] attended family meeting with PM NP. Pt's wife, Phillip Campos, daughter, April, son, Frances Garcia and his wife, and pt's brother, Tc Omer, all present. Family had spoken with neurology this morning and are aware that they will need to make decisions about pt's care. Wife Phillip Campos is tearful, states that she does understand pt's condition but is emotionally having difficulty with decisions she knows will be made. Pt does have a Living Will, discussed under what circumstances that applies. Family described pt as very active prior to this occurrence and states that he would have difficulty with a lower quality of life if he would not be able to recover more fully from this. Family also understands that pt has multiple issues with his health history. Discussed code status at length. Phillip Campos states she is considering DNR-CCA status but wished to speak further with family prior to making this decision. She and family may also consider compassionate withdrawal, NP reviewed this gently with family as they had questions. Discussed that code status change can be communicated, when appropriate, to any staff. PM contact information provided. Psychosocial support provided.

## 2020-03-07 NOTE — PROGRESS NOTES
[facility] PROGRESS NOTE                                                                       3/7/2020        CHIEF UROLOGIC COMPLAINT: CVA after TURP off of blood thinners     HISTORY OF PRESENT ILLNESS:  Patient intubated, not responsive. In neuro ICU. No family at patient's bedside. Condition poor per nursing.     REVIEW OF SYSTEMS:   Unable to obtain    PAST FAMILY HISTORY:    Family History   Problem Relation Age of Onset    Stroke Mother     Cancer Sister      PAST SOCIAL HISTORY:    Social History     Socioeconomic History    Marital status:      Spouse name: None    Number of children: None    Years of education: None    Highest education level: None   Occupational History    Occupation: dahl   Social Needs    Financial resource strain: None    Food insecurity     Worry: None     Inability: None    Transportation needs     Medical: None     Non-medical: None   Tobacco Use    Smoking status: Never Smoker    Smokeless tobacco: Never Used   Substance and Sexual Activity    Alcohol use: Yes     Comment: occassionally    Drug use: No    Sexual activity: None   Lifestyle    Physical activity     Days per week: None     Minutes per session: None    Stress: None   Relationships    Social connections     Talks on phone: None     Gets together: None     Attends [employer] service: None     Active member of club or organization: None     Attends meetings of clubs or organizations: None     Relationship status: None    Intimate partner violence     Fear of current or ex partner: None     Emotionally abused: None     Physically abused: None     Forced sexual activity: None   Other Topics Concern    None   Social History Narrative    None       Scheduled Meds:   sodium chloride flush  10 mL Intravenous 2 times per day    chlorhexidine  15 mL Mouth/Throat BID    insulin lispro  0-18 Units Subcutaneous Q4H    atorvastatin  40 mg Per NG tube Nightly    levetiracetam  500 mg Intravenous BID    ampicillin-sulbactam  3 g Intravenous Q6H    sennosides  5 mL Per NG tube Nightly    polyethylene glycol  17 g Per NG tube Daily    digoxin  125 mcg Intravenous Daily    metoprolol  2.5 mg Intravenous Q6H    pantoprazole  40 mg Intravenous Daily    And    sodium chloride (PF)  10 mL Intravenous Daily     Continuous Infusions:   heparin (porcine) 12 Units/kg/hr (03/07/20 0647)    propofol 14 mcg/kg/min (03/07/20 0521)    dextrose      sodium chloride 75 mL/hr at 03/07/20 0500    norepinephrine 2 mcg/min (03/07/20 0500)     PRN Meds:.heparin (porcine), heparin (porcine), sodium chloride flush, acetaminophen **OR** acetaminophen, labetalol, glucose, dextrose, glucagon (rDNA), dextrose, fentanNYL, polyvinyl alcohol    BP (!) 147/59   Pulse 60   Temp 97.9 °F (36.6 °C) (Bladder)   Resp 16   Ht 5' 10\" (1.778 m)   Wt 174 lb 9.6 oz (79.2 kg)   SpO2 98%   BMI 25.05 kg/m²     Lab Results   Component Value Date    WBC 6.8 03/07/2020    HGB 11.8 (L) 03/07/2020    HCT 34.4 (L) 03/07/2020    MCV 93.7 03/07/2020     03/07/2020       Lab Results   Component Value Date    CREATININE 0.9 03/07/2020       Lab Results   Component Value Date    PSA 2.39 02/27/2020    PSA 6.06 (H) 11/22/2016       No results for input(s): Stephania Rae in the last 72 hours. Recent Labs     03/05/20  0406   BC 24 Hours- no growth       Recent Labs     03/05/20  0406   BLOODCULT2 24 Hours- no growth       PHYSICAL EXAMINATION:  Skin dry, without rashes  Respirations vent  Patient sedated/obtunded      ASSESSMENT AND PLAN:  1. Cerebrovascular accident after holding blood thinners due to recurrent gross hematuria after TURP. His condition is serious at this time. Care mainly per neurology. Will follow. Available at any time if any questions or concerns.     Aarti López M.D.  3/7/2020  10:01 AM

## 2020-03-07 NOTE — PROGRESS NOTES
with atrial fibrillation.   Dilated right ventricle.   RVEDD is 4.8 cm.   Mild right ventricular systolic dysfunction.   Apically tethered mitral valve leaflets, posterior >> anterior.   Moderate 2-3 + mitral regurgitation with eccentric posteriorly directed   jet.   MR ERO is 0.2 cm2.   MR RV 26 ml.   MR rad is 0.8 cm.   Mild tricuspid regurgitation.   Mild pulmonic regurgitation.   When compared to imaging completed in 2015 the LVEF has decreased from 60   % to 30 % and with development of secondary valvular regurgitation.     Lexiscan nuclear stress test: 2/2020  1. Perfusion imaging suggests a small area of mildly reversible   ischemia at the inferolateral apical wall without a clear   corresponding segmental wall motion abnormality in this globally   hypokinetic left ventricle. This reduces the positive predictive value   of the perfusion findings. 2. Ejection fraction is 18 %. 3. Dilated LV. Global hypokinesis.       Impression/Plan:  1. CVA with acute right thalamic stroke (recently off [address] d/t hematuria)  2. Persistent atrial fibrillation -- rate controlled  3. Acute respiratory failure -- intubated  4. Chronic HFrEF  5. Moderate mitral regurgitation  6. Chronic LBBB  7. History of HTN  8. HLD  9. DM -- on insulin  10. Anemia -- most recent Hgb 11.4  11. History of hematuria -- follows with urology     - Off the pressores, on heparin  - Resume GDMT once hemodynamics stable   - Resume anticoagulation once ok with neurology/urology  - Monitor telemetry  - Care per neurology and ICU team, input noted. - D/w the family who are the bedside,. Mikal Ramos MD., [facility] [place].   [facility]) Cardiology

## 2020-03-07 NOTE — PROGRESS NOTES
(79.2 kg)   SpO2 98%   BMI 25.05 kg/m²     General appearance: Intubated, sedation, eyes closed and not following commands, no obvious distress  Head: normocephalic, without obvious abnormality, atraumatic  Lungs: Coarse-rhonchi, diminished to auscultation bilaterally  Heart: irregular rate and rhythm, A. fib with occasional PVCs on telemetry   Extremities: normal, atraumatic, no cyanosis, generalized edema +1-2  Pulses: 2+ and symmetric  Skin: no rashes or lesions    Mental Status: Intubated, sedated on propofol, not following commands    I: smell    II: visual acuity     II: visual fields    II: pupils pupils equal but sluggish   III,VII: ptosis None   III,IV,VI: extraocular muscles  Left eye deviated in and down slightly    V: mastication    V: facial light touch sensation  No response to PP   V,VII: corneal reflex  Present   VII: facial muscle function - upper     VII: facial muscle function - lower appears symmetric   VIII: hearing    IX: soft palate elevation     IX,X: gag reflex Present   XI: trapezius strength     XI: sternocleidomastoid strength    XI: neck extension strength   turns neck   XII: tongue strength       Motor:  No random movement noted in limbs   Normal bulk     Sensory:  Appears to withdraw to noxious stimulation in arms     DTR:   Right Brachioradialis reflex 1+  Left Brachioradialis reflex 1+  Right Biceps reflex 1+  Left Biceps reflex 1+  Right Quadriceps reflex 0  Left Quadriceps reflex 0  Right Achilles reflex 0  Left Achilles reflex 0    b/l Babinskis  No Billingsley's    No pathological reflexes    Laboratory/Radiology:     CBC:   Lab Results   Component Value Date    WBC 6.8 03/07/2020    RBC 3.67 03/07/2020    HGB 11.8 03/07/2020    HCT 34.4 03/07/2020    MCV 93.7 03/07/2020    MCH 32.2 03/07/2020    MCHC 34.3 03/07/2020    RDW 13.1 03/07/2020     03/07/2020    MPV 11.3 03/07/2020     CMP:    Lab Results   Component Value Date     03/07/2020    K 4.2 03/07/2020    K 3.9 mitral regurgitation with eccentric posteriorly directed jet. MR ERO is 0.2 cm2. MR RV 26 ml.   MR rad is 0.8 cm. Mild tricuspid regurgitation. Mild pulmonic regurgitation. When compared to imaging completed in 2015 the LVEF has decreased from 60   % to 30 % and with development of secondary valvular regurgitation. EEG 3/6: This EEG shows a moderate to severe diffuse encephalopathy. No epileptiform activity or lateralizing signs are seen. All labs and imaging reviewed independently today. Assessment:     Patient presented to ED after being found unresponsive seemingly having a general tonic-clonic seizure     Patient has a history of A. fib but was off oral anticoagulation for about 1 month due to gross hematuria    On admission, patient was found to have bilateral thalamic strokes as well as right PCA distribution stroke and left temporal lobe stroke     EEG consistent with moderate to severe diffuse encephalopathy with no seizure activity noted   Now receiving IV heparin     Chest x-ray today shows infiltrates are slightly worse than prior study.    Patient currently on Unasyn    Plan:     Continue supportive care in ICU    Continue Keppra 500 mg twice daily  Continue statin    IM, cardiology, palliative, and critical care team following    Mike Schofield DNP, APRN   9:44 AM  3/7/2020

## 2020-03-07 NOTE — PROGRESS NOTES
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 Patient is currently sedated but is moving during exam. 3/7- Neurology spoke to family. All family in attendance for meeting.      Goals of care: continue current management  Code Status: full code- discussed in great detail different options   Advanced Directives: none  Surrogate/Legal NOK: Spouse  Contacts:  [place] [phone]    Spiritual assessment: No spiritual distress identified   Bereavement and grief: to be determined    Past Medical History:   Diagnosis Date    Atrial fibrillation ([address]     [facility]     Diabetes mellitus [facility]     Hypertension     Legal blindness     peripheral vison intact       Past Surgical History:   Procedure Laterality Date    BACK SURGERY      CARPAL TUNNEL RELEASE      CYSTOSCOPY N/A 2/27/2020    CYSTOSCOPY RETROGRADE PYELOGRAM CLOT EVACUATIOn,  RESECTION  OF PROSTATE GLAND WITH NIÑO INSERTION performed by Donald Camacho MD at [address] [place] [address]  05/2018    [address] SKIN CANCER EXCISION  04/2019    TURP  11/22/2016       Family History   Problem Relation Age of Onset    Stroke Mother     Cancer Sister      Social history:  [place] status: unknown   Marital status:   Living status: with family:  spouse   Work history: [employer] x 40 years     History obtain from EMR    Current Medications:  Inpatient medications reviewed: yes  Home Medications reviewed: yes    No Known Allergies    Review of Systems:  patient intubated and lightly sedated    Objective:   PHYSICAL EXAM:   Vitals:    BP (!) 147/59   Pulse 55   Temp 97.7 °F (36.5 °C) (Bladder)   Resp 16   Ht 5' 10\" (1.778 m)   Wt 174 lb 9.6 oz (79.2 kg)   SpO2 97%   BMI 25.05 kg/m²   Gen: elderly male, NAD, sedated with minimal movements during exam  HEENT: laceration left upper lip, endotracheal tube, NG tube  Neck: Trachea midline  Lungs:

## 2020-03-08 NOTE — PROGRESS NOTES
INPATIENT CARDIOLOGY FOLLOW-UP    Name: Leslie Army    Age: 67 y.o. Date of Admission: 3/5/2020  2:09 AM    Date of Service: 3/8/2020    Chief Complaint: Follow-up for Low EF. Admitted with CVA. Bradycardia. Known AF    Interim History:  No new cardiac events, he is normally sedated and not responding to commands, he is on heparin and no episodes of hematuria. AF on the monitor.      Review of Systems:   Cardiac: As per HPI  General: No fever, chills  Pulmonary: As per HPI  HEENT: No visual disturbances, difficult swallowing  GI: No nausea, vomiting  Endocrine: No thyroid disease or DM  Musculoskeletal: NGUYEN x 4, no focal motor deficits  Skin: Intact, no rashes  Neuro/Psych: No headache or seizures    Problem List:  Patient Active Problem List   Diagnosis    PFO (patent foramen ovale)    Diabetes mellitus type 2, uncontrolled ([address]    Essential hypertension    Hyperlipidemia LDL goal <100    Gross hematuria    Pneumonia due to infectious organism    Paroxysmal atrial fibrillation (HCC)    Chronic combined systolic and diastolic congestive heart failure ([address]    Cerebrovascular accident (CVA) ([address]    Acute respiratory failure with hypoxia and hypercapnia ([address]    Acute respiratory failure ([address]    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 ([address]    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 ([address]    Acute ischemic VBA thalamic stroke, right (HCC)       Allergies:  No Known Allergies    Current Medications:  Current Facility-Administered Medications   Medication Dose Route Frequency Provider Last Rate Last Dose    magnesium sulfate 4 g in 100 mL IVPB premix  4 g Intravenous Once PAULINO Cuello 25 mL/hr at 03/08/20 1107 4 g at 03/08/20 1107    potassium & sodium phosphates (PHOS-NAK) 280-160-250 MG packet 250 mg  1 packet Per NG tube 4x Daily PAULINO Cuello CNS   250 mg at 03/08/20 1325    hydrALAZINE Units  0-18 Units Subcutaneous Q4H PAULINO Tijerina   3 Units at 03/08/20 1022    atorvastatin (LIPITOR) tablet 40 mg  40 mg Per NG tube Nightly PAULINO Tijerina   40 mg at 03/07/20 2058    levetiracetam (KEPPRA) 500 mg/100 mL IVPB  500 mg Intravenous BID PAULINO Tijerina   Stopped at 03/08/20 6267    polyvinyl alcohol (LIQUIFILM TEARS) 1.4 % ophthalmic solution 1 drop  1 drop Both Eyes Q4H PRN PAUILNO Tijerina        ampicillin-sulbactam (UNASYN) 3 g ivpb minibag  3 g Intravenous Q6H PAULINO Tijerina 200 mL/hr at 03/08/20 1325 3 g at 03/08/20 1325    sennosides (SENOKOT) 8.8 MG/5ML syrup 5 mL  5 mL Per NG tube Nightly PAULINO Tijerina   5 mL at 03/07/20 2104    polyethylene glycol (GLYCOLAX) packet 17 g  17 g Per NG tube Daily PAULINO Tijerina   17 g at 03/08/20 0828    digoxin (LANOXIN) injection 125 mcg  125 mcg Intravenous Daily PAULINO Tijerina   125 mcg at 03/08/20 0820    metoprolol (LOPRESSOR) injection 2.5 mg  2.5 mg Intravenous Q6H PAULINO Tijerina   Stopped at 03/08/20 1339    pantoprazole (PROTONIX) injection 40 mg  40 mg Intravenous Daily PAULINO Tijerina   40 mg at 03/08/20 0820    And    sodium chloride (PF) 0.9 % injection 10 mL  10 mL Intravenous Daily PAULINO Tijerina CNS   10 mL at 03/06/20 0829      heparin (porcine) 14 Units/kg/hr (03/08/20 0718)    propofol 20 mcg/kg/min (03/08/20 0140)    dextrose         Physical Exam:  BP (!) 147/59   Pulse 66   Temp 98.8 °F (37.1 °C) (Bladder)   Resp 16   Ht 5' 10\" (1.778 m)   Wt 177 lb 8 oz (80.5 kg)   SpO2 95%   BMI 25.47 kg/m²   Wt Readings from Last 3 Encounters:   03/08/20 177 lb 8 oz (80.5 kg)   02/29/20 170 lb (77.1 kg)   02/27/20 171 lb (77.6 kg)     Appearance: Sedated, on vent, not responding to commands  Skin: Intact, no rash  Head: Normocephalic, atraumatic  ENMT: +ETT, no rhinorrhea  Neck: Supple, no carotid bruits  Lungs: Decreased BS B/L, no wheezing  Cardiac: IRRR, 2/6 systolic murmur  Abdomen: Soft, +bowel sounds  Extremities: Moves all extremities x 4, no lower extremity edema  Neurologic: Unable to assess (sedated)  Intake/Output:    Intake/Output Summary (Last 24 hours) at 3/8/2020 1433  Last data filed at 3/8/2020 1300  Gross per 24 hour   Intake 2325.89 ml   Output 1600 ml   Net 725.89 ml     I/O this shift:  In: -   Out: 150 [Urine:150]    Laboratory Tests:  Recent Labs     03/06/20  0425 03/07/20  0530 03/08/20  0500    142 142   K 3.9 4.2 3.8   * 110* 108*   CO2 17* 15* 20*   BUN 14 12 11   CREATININE 0.9 0.9 0.8   GLUCOSE 143* 161* 173*   CALCIUM 8.6 8.6 8.7     Lab Results   Component Value Date    MG 1.7 03/08/2020     Recent Labs     03/06/20  0425 03/08/20  0500   ALKPHOS 58 53   ALT 13 11   AST 18 18   PROT 5.4* 5.2*   BILITOT 0.3 0.3   LABALBU 3.0* 2.6*     Recent Labs     03/06/20  1615 03/07/20  0530 03/08/20  0500   WBC 7.7 6.8 7.4   RBC 3.73* 3.67* 3.81   HGB 11.7* 11.8* 12.2*   HCT 36.3* 34.4* 36.3*   MCV 97.3 93.7 95.3   MCH 31.4 32.2 32.0   MCHC 32.2 34.3 33.6   RDW 13.4 13.1 13.2    185 205   MPV 11.2 11.3 10.9     Lab Results   Component Value Date    CKTOTAL 57 10/31/2016    CKMB 3.9 10/16/2016    TROPONINI 0.05 (H) 03/05/2020    TROPONINI 0.05 (H) 03/05/2020    TROPONINI 0.05 (H) 02/03/2020     Lab Results   Component Value Date    INR 1.3 02/29/2020    INR 1.4 02/01/2020    PROTIME 14.2 (H) 02/29/2020    PROTIME 16.7 (H) 02/01/2020     Lab Results   Component Value Date    TSH 2.890 10/15/2016     Lab Results   Component Value Date    LABA1C 6.5 (H) 02/04/2020     No results found for: EAG  Lab Results   Component Value Date    CHOL 65 03/06/2020    CHOL 91 01/11/2020     Lab Results   Component Value Date    TRIG 115 03/06/2020    TRIG 74 01/11/2020     Lab Results   Component Value Date    HDL 26 03/06/2020    HDL 35 01/11/2020     Lab Results   Component Value Date lobe,    Impression:  1. CVA with acute right thalamic stroke (recently off [address] d/t hematuria)  2. Persistent atrial fibrillation -- rate controlled  3. Acute respiratory failure -- intubated  4. Chronic HFrEF  5. Moderate mitral regurgitation  6. Chronic LBBB  7. History of HTN  8. HLD  9. DM -- on insulin  10. Anemia -- most recent Hgb 11.4  11. History of hematuria -- follows with urology  Plan:  - Off the pressores, on heparin  - Resume GDMT once hemodynamics stable, today his blood pressures are on the softer side and will avoid adding vasodilator therapy due to acute stroke  -Continue IV heparin and resume anticoagulation once ok with neurology/urology  -Continue low-dose IV diuretic therapy  Continue digoxin 125 mcg IV daily and Toprol 2.5 mg every 6 hours for rate control  - Monitor telemetry  - Care per neurology and ICU team, input noted. - D/w the family who are the bedside,.   -Overall prognosis remains very grim    Tito Rinaldi MD., [facility] - [place].   [facility]) Cardiology

## 2020-03-08 NOTE — PROGRESS NOTES
Mercy Meléndez is a 67 y.o. right handed male     Neurology is following for stroke    PMH: HTN, hx Afib---OAC (Xarelto) held d/t hematuria,DM2, CHF, legal blindness    Patient presented to ED on 3/5/2020 for altered mental status. Patient was found on the floor by his wife unresponsive--moaning and twitching all over with foaming at the mouth; no incontinence this lasted for more than 5 to 10 minutes   Upon EMS arrival patient's blood glucose was 70 and patient was given D10. Patient remained altered. Last known well is unknown, due to this patient was not a TPA candidate. Patient was intubated due to respiratory compromise. Initial head CT completed 3/5 was negative for hemorrhage however right occipital lobe infarct was suspected and MRI was recommended. CT C-spine was negative for fracture. CT perfusion showed small right occipital lobe hypodensity suspicious for subacute infarct   MRI brain completed later on 3/5 showed acute or early subacute bilateral ophthalmic lacunar infarcts and left mesial temporal lobe infarct consistent with bilateral PCA territory infarcts. Retrospective evaluation of CT does demonstrate high-grade stenosis of P2 segments of the posterior cerebral arteries. Complete echo completed 1/12 was consistent with atrial fibrillation and ejection fraction of 30%.    EEG showed moderate to severe encephalopathy; no seizures noted     Patient was recently admitted for gross hematuria and oral anticoagulation was paused and to be restarted 3/6/20   Unfortunately was admitted earlier in the week with above findings    Now maintained on heparin    Patient has no history of seizures in the past    Attempted to wean propofol but became agitated and threw legs out of the bed     No family present this am -- updated extensively by myself and then by Dr Valverde Search per their request     Objective:     BP (!) 147/59   Pulse 54   Temp 98.8 °F (37.1 °C) (Bladder)   Resp 14   Ht 5' 10\" (1.778 m)   Wt 177 lb 8 oz (80.5 kg)   SpO2 98%   BMI 25.47 kg/m²     General appearance: Intubated, sedation, eyes closed and not following commands, no obvious distress  Head: normocephalic, without obvious abnormality, atraumatic  Lungs: Coarse-rhonchi, diminished to auscultation bilaterally  Heart: irregular rate and rhythm, A. fib with occasional PVCs on telemetry   Extremities: normal, atraumatic, no cyanosis, generalized edema +1-2  Pulses: 2+ and symmetric  Skin: no rashes or lesions    Mental Status: Intubated, sedated on propofol, not following commands    I: smell    II: visual acuity     II: visual fields    II: pupils pupils equal but sluggish   III,VII: ptosis None   III,IV,VI: extraocular muscles  No ocular deviation appreciated today    V: mastication    V: facial light touch sensation  No response to PP   V,VII: corneal reflex  Present   VII: facial muscle function - upper     VII: facial muscle function - lower appears symmetric   VIII: hearing    IX: soft palate elevation     IX,X: gag reflex Present   XI: trapezius strength     XI: sternocleidomastoid strength    XI: neck extension strength   turns neck   XII: tongue strength       Motor:  No random movement noted in limbs   Normal bulk     Sensory:  Appears to withdraw to noxious stimulation in arms     DTR:   Right Brachioradialis reflex 1+  Left Brachioradialis reflex 1+  Right Biceps reflex 1+  Left Biceps reflex 1+  Right Quadriceps reflex 0  Left Quadriceps reflex 0  Right Achilles reflex 0  Left Achilles reflex 0    b/l Babinskis  No Billingsley's    No pathological reflexes    Laboratory/Radiology:     CBC:   Lab Results   Component Value Date    WBC 7.4 03/08/2020    RBC 3.81 03/08/2020    HGB 12.2 03/08/2020    HCT 36.3 03/08/2020    MCV 95.3 03/08/2020    MCH 32.0 03/08/2020    MCHC 33.6 03/08/2020    RDW 13.2 03/08/2020     03/08/2020    MPV 10.9 03/08/2020     CMP:    Lab Results   Component Value Date     03/08/2020    K 3.8 03/08/2020    K 3.9 02/05/2020     03/08/2020    CO2 20 03/08/2020    BUN 11 03/08/2020    CREATININE 0.8 03/08/2020    GFRAA >60 03/08/2020    LABGLOM >60 03/08/2020    GLUCOSE 173 03/08/2020    PROT 5.2 03/08/2020    LABALBU 2.6 03/08/2020    CALCIUM 8.7 03/08/2020    BILITOT 0.3 03/08/2020    ALKPHOS 53 03/08/2020    AST 18 03/08/2020    ALT 11 03/08/2020     HgBA1c:    Lab Results   Component Value Date    LABA1C 6.5 02/04/2020     FLP:    Lab Results   Component Value Date    TRIG 115 03/06/2020    HDL 26 03/06/2020    LDLCALC 16 03/06/2020    LABVLDL 23 03/06/2020     MRI Brain:  1. Acute or early subacute bilateral thalamic lacunar infarctions and  infarction of the left mesial temporal lobe. Findings are consistent  with bilateral PCA territory infarctions. Retrospective evaluation of  the CT does demonstrate high-grade stenoses in the P2 segments of the  posterior cerebral arteries. 2. No significant edema, mass effect or midline shift. 3. Chronic right occipital lobe infarction. CTA Head:  Short segment mild stenosis of the proximal internal carotid arteries   bilaterally of less than 50%. No other significant stenosis or occlusion within   the carotid or vertebral arteries of the neck. No significant stenosis, aneurysmal dilatation or large vessel occlusion within   the anterior or posterior intracranial arterial circulation. Moderate to large bilateral pleural effusions in the lung apices. Airspace   consolidation with air bronchograms in the right lung apex may represent   infiltrate/pneumonia. Complete echo 1/12/2020:  Concentric hypertrophy. PWd is 1.6 cm. Severe left ventricular systolic dysfunction. Visually estimated LVEF is 30 %. Severe global hypokinesis. Diastolic pattern consistent with atrial fibrillation. Dilated right ventricle. RVEDD is 4.8 cm. Mild right ventricular systolic dysfunction. Apically tethered mitral valve leaflets, posterior >> anterior. Moderate 2-3 + mitral regurgitation with eccentric posteriorly directed jet. MR ERO is 0.2 cm2. MR RV 26 ml.   MR rad is 0.8 cm. Mild tricuspid regurgitation. Mild pulmonic regurgitation. When compared to imaging completed in 2015 the LVEF has decreased from 60   % to 30 % and with development of secondary valvular regurgitation. EEG 3/6: This EEG shows a moderate to severe diffuse encephalopathy. No epileptiform activity or lateralizing signs are seen. All labs and imaging reviewed independently today.     Assessment:     Patient presented to ED after being found unresponsive seemingly having a general tonic-clonic seizure     Patient has a history of A. fib but was off oral anticoagulation for about 1 month due to gross hematuria    On admission, patient was found to have bilateral thalamic strokes as well as right PCA distribution stroke and left temporal lobe stroke     EEG consistent with moderate to severe diffuse encephalopathy with no seizure activity noted   Now receiving IV heparin     Plan:     Continue supportive care in ICU    Continue Keppra 500 mg twice daily  Continue statin    IM, cardiology, palliative, and critical care team following    Raford Duverney, DNP, APRN   8:33 AM  3/8/2020

## 2020-03-08 NOTE — PLAN OF CARE
Problem: Falls - Risk of:  Goal: Will remain free from falls  Description: Will remain free from falls  Outcome: Met This Shift  Goal: Absence of physical injury  Description: Absence of physical injury  Outcome: Met This Shift     Problem: Risk for Impaired Skin Integrity  Goal: Tissue integrity - skin and mucous membranes  Description: Structural intactness and normal physiological function of skin and  mucous membranes.   Outcome: Met This Shift     Problem: HEMODYNAMIC STATUS  Goal: Patient has stable vital signs and fluid balance  Outcome: Met This Shift     Problem: ACTIVITY INTOLERANCE/IMPAIRED MOBILITY  Goal: Mobility/activity is maintained at optimum level for patient  Outcome: Met This Shift     Problem: COMMUNICATION IMPAIRMENT  Goal: Ability to express needs and understand communication  Outcome: Met This Shift

## 2020-03-08 NOTE — PROGRESS NOTES
care: Artificial tears. Peridex. [place]: Keep in place for critical care monitoring of fluid balance. Ancillary consults:  Neurology. Medicine. Palliative medicine. Cardiology. Patient/Family update:  Family in room. Questions answered. Support given. Code status:  Limited code-meds only. Disposition:  NSICU.       Electronically signed by Mainor Zhang RN MSN APRN-NP [facility] NP  CCNS CCRN 3/8/2020 7:08 AM

## 2020-03-09 NOTE — PROGRESS NOTES
Nutrition Assessment (Enteral Nutrition)    Type and Reason for Visit: Initial    Nutrition Recommendations: Modify current Tube Feeding  Current EN regimen not meeting estimated needs. EN Recommendation: Diabetic 1.5 @50ml/hr to provide: 1200ml, 1800kcals, 99gm pro, 911ml water     Nutrition Assessment: Pt admit 2/2 CVA. On vent w/ initiation of EN. Malnutrition Assessment:  · Malnutrition Status: At risk for malnutrition  · Context: Acute illness or injury  · Findings of the 6 clinical characteristics of malnutrition (Minimum of 2 out of 6 clinical characteristics is required to make the diagnosis of moderate or severe Protein Calorie Malnutrition based on AND/ASPEN Guidelines):  1. Energy Intake-Less than or equal to 50% of estimated energy requirement, (x4 days)    2. Weight Loss-Unable to assess(d/t massive wt shifts x3 mo PTA), unable to assess  3. Fat Loss-No significant subcutaneous fat loss,    4. Muscle Loss-Mild muscle mass loss,    5. Fluid Accumulation-No significant fluid accumulation,    6.  Strength-Not measured    Nutrition Risk Level:  Moderate    Nutrition Needs:  · Estimated Daily Total Kcal: (PS3B Tmax 37.4, MV 6.38; 1714)  · Estimated Daily Protein (g): (1.3-1.5gm/kg IBW)    Nutrition Diagnosis:   · Problem: Inadequate oral intake  · Etiology: related to Impaired respiratory function-inability to consume food     Signs and symptoms:  as evidenced by NPO status due to medical condition, Nutrition support - EN, Intubation    Objective Information:  · Nutrition-Focused Physical Findings: active BS, soft abd, encephalopathy, legally blind, agitation, elevated BSL, trace to +2 edema, + I/Os, NGT   · Wound Type: None  · Current Nutrition Therapies:  · Oral Diet Orders: NPO   · Tube Feeding (TF) Orders:   · Feeding Route: Nasogastric  · Formula: 1.5 Diabetic  · Rate (ml/hr):30ml/hr    · Volume (ml/day): 720ml TV   · Duration: Continuous  · Current TF & Flush Orders

## 2020-03-09 NOTE — PROGRESS NOTES
Ria Hyde is a 67 y.o. right handed male     Neurology is following for stroke    PMH: HTN, hx Afib---OAC (Xarelto) held d/t hematuria,DM2, CHF, legal blindness    Patient presented to ED on 3/5/2020 for altered mental status. Patient was found on the floor by his wife unresponsive--moaning and twitching all over with foaming at the mouth; no incontinence this lasted for more than 5 to 10 minutes   Upon EMS arrival patient's blood glucose was 70 and patient was given D10. Patient remained altered. Last known well is unknown, due to this patient was not a TPA candidate. Patient was intubated due to respiratory compromise. Initial head CT completed 3/5 was negative for hemorrhage however right occipital lobe infarct was suspected and MRI was recommended. CT C-spine was negative for fracture. CT perfusion showed small right occipital lobe hypodensity suspicious for subacute infarct   MRI brain completed later on 3/5 showed acute or early subacute bilateral ophthalmic lacunar infarcts and left mesial temporal lobe infarct consistent with bilateral PCA territory infarcts. Retrospective evaluation of CT does demonstrate high-grade stenosis of P2 segments of the posterior cerebral arteries. Complete echo completed 1/12 was consistent with atrial fibrillation and ejection fraction of 30%.    EEG showed moderate to severe encephalopathy; no seizures noted     Patient was recently admitted for gross hematuria and oral anticoagulation was paused and to be restarted 3/6/20   Unfortunately was admitted earlier in the week with above findings    Now maintained on heparin    Patient has no history of seizures in the past    Now off sedation with limited agitation     No family present this am -- updated extensively by myself and then by Dr Nicole Aparicio per their request over the weekend     Last CT was 3/6/20    Clinically unchanged since that time     Objective:     BP (!) 120/51   Pulse 59   Temp 97.9 °F dysfunction. Apically tethered mitral valve leaflets, posterior >> anterior. Moderate 2-3 + mitral regurgitation with eccentric posteriorly directed jet. MR ERO is 0.2 cm2. MR RV 26 ml.   MR rad is 0.8 cm. Mild tricuspid regurgitation. Mild pulmonic regurgitation. When compared to imaging completed in 2015 the LVEF has decreased from 60   % to 30 % and with development of secondary valvular regurgitation. EEG 3/6: This EEG shows a moderate to severe diffuse encephalopathy. No epileptiform activity or lateralizing signs are seen. All labs and imaging reviewed independently today.     Assessment:     Patient presented to ED after being found unresponsive seemingly having a general tonic-clonic seizure     Patient has a history of A. fib but was off oral anticoagulation for about 1 month due to gross hematuria    On admission, patient was found to have bilateral thalamic strokes as well as right PCA distribution stroke and left temporal lobe stroke     EEG consistent with moderate to severe diffuse encephalopathy with no seizure activity noted   Now receiving IV heparin     Plan:     Continue supportive care in ICU    Continue Keppra 500 mg twice daily  Continue statin    Repeat CT Head -- he has been essentially neurologically unchanged since that time     IM, cardiology, palliative, and critical care team following    Inez Oliva DNP, APRN   9:50 AM  3/9/2020

## 2020-03-09 NOTE — PROGRESS NOTES
INPATIENT CARDIOLOGY FOLLOW-UP    Name: Brandon Moore    Age: 67 y.o. Date of Admission: 3/5/2020  2:09 AM    Date of Service: 3/9/2020    Chief Complaint: Follow-up for Low EF. Admitted with CVA. Bradycardia. Known AF    Interim History:  No new cardiac events, is off sedation and responding and follows commands, he is on heparin and no episodes of hematuria. AF on the monitor.      Review of Systems:   Cardiac: As per HPI  General: No fever, chills  Pulmonary: As per HPI  HEENT: No visual disturbances, difficult swallowing  GI: No nausea, vomiting  Endocrine: No thyroid disease or DM  Musculoskeletal: NGUYEN x 4, no focal motor deficits  Skin: Intact, no rashes  Neuro/Psych: No headache or seizures    Problem List:  Patient Active Problem List   Diagnosis    PFO (patent foramen ovale)    Diabetes mellitus type 2, uncontrolled ([address]    Essential hypertension    Hyperlipidemia LDL goal <100    Gross hematuria    Pneumonia due to infectious organism    Paroxysmal atrial fibrillation (HCC)    Chronic combined systolic and diastolic congestive heart failure ([address]    Cerebrovascular accident (CVA) ([address]    Acute respiratory failure with hypoxia and hypercapnia ([address]    Acute respiratory failure ([address]    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 ([address]    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 ([address]    Acute ischemic VBA thalamic stroke, right (HCC)       Allergies:  No Known Allergies    Current Medications:  Current Facility-Administered Medications   Medication Dose Route Frequency Provider Last Rate Last Dose    potassium & sodium phosphates (PHOS-NAK) 280-160-250 MG packet 250 mg  1 packet Per NG tube 4x Daily ChipPAULINO Olmos - CNS   250 mg at 03/09/20 1428    hydrALAZINE (APRESOLINE) injection 10 mg  10 mg Intravenous Q10 Min PRN Chip Maurer APRN - CNS        heparin (porcine) injection 5,020 Units  60 Intravenous BID Lorenda So, APRN - CNS   Stopped at 03/09/20 0920    polyvinyl alcohol (LIQUIFILM TEARS) 1.4 % ophthalmic solution 1 drop  1 drop Both Eyes Q4H PRN Lorenda So, APRN - CNS        ampicillin-sulbactam (UNASYN) 3 g ivpb minibag  3 g Intravenous Q6H Lorenda So, APRN - CNS   Stopped at 03/09/20 1458    sennosides (SENOKOT) 8.8 MG/5ML syrup 5 mL  5 mL Per NG tube Nightly Lorenda So, APRN - CNS   5 mL at 03/08/20 2053    polyethylene glycol (GLYCOLAX) packet 17 g  17 g Per NG tube Daily Lorenda So, APRN - CNS   17 g at 03/09/20 0901    digoxin (LANOXIN) injection 125 mcg  125 mcg Intravenous Daily Lorenda So, APRN - CNS   125 mcg at 03/09/20 0900    metoprolol (LOPRESSOR) injection 2.5 mg  2.5 mg Intravenous Q6H Lorenda So, APRN - CNS   Stopped at 03/09/20 0901    pantoprazole (PROTONIX) injection 40 mg  40 mg Intravenous Daily Lorenda So, APRN - CNS   40 mg at 03/09/20 0900    And    sodium chloride (PF) 0.9 % injection 10 mL  10 mL Intravenous Daily Lorenda So, APRN - CNS   10 mL at 03/09/20 0902      heparin (porcine) 14 Units/kg/hr (03/09/20 0780)    propofol Stopped (03/09/20 0800)    dextrose         Physical Exam:  BP (!) 120/51   Pulse 64   Temp 97.9 °F (36.6 °C) (Bladder)   Resp 14   Ht 5' 10\" (1.778 m)   Wt 182 lb 12.8 oz (82.9 kg)   SpO2 100%   BMI 26.23 kg/m²   Wt Readings from Last 3 Encounters:   03/09/20 182 lb 12.8 oz (82.9 kg)   02/29/20 170 lb (77.1 kg)   02/27/20 171 lb (77.6 kg)     Appearance: Sedated, on vent, responding to commands  Skin: Intact, no rash  Head: Normocephalic, atraumatic  ENMT: +ETT, no rhinorrhea  Neck: Supple, no carotid bruits  Lungs: Decreased BS B/L, no wheezing  Cardiac: IRRR, 2/6 systolic murmur  Abdomen: Soft, +bowel sounds  Extremities: Moves all extremities x 4, no lower extremity edema  Neurologic:  He is able to squeeze his right hand but not the left hand. He opens his eyes. Intake/Output:    Intake/Output Summary (Last 24 hours) at 3/9/2020 1815  Last data filed at 3/9/2020 1458  Gross per 24 hour   Intake 2023.39 ml   Output 1269 ml   Net 754.39 ml     No intake/output data recorded.     Laboratory Tests:  Recent Labs     03/07/20  0530 03/08/20  0500 03/09/20  0423    142 144   K 4.2 3.8 3.8   * 108* 109*   CO2 15* 20* 23   BUN 12 11 12   CREATININE 0.9 0.8 0.9   GLUCOSE 161* 173* 165*   CALCIUM 8.6 8.7 8.9     Lab Results   Component Value Date    MG 1.9 03/09/2020     Recent Labs     03/08/20  0500 03/09/20  0423   ALKPHOS 53 59   ALT 11 14   AST 18 27   PROT 5.2* 5.4*   BILITOT 0.3 0.3   LABALBU 2.6* 2.7*     Recent Labs     03/07/20 0530 03/08/20  0500 03/09/20  0423   WBC 6.8 7.4 7.5   RBC 3.67* 3.81 3.76*   HGB 11.8* 12.2* 12.0*   HCT 34.4* 36.3* 35.8*   MCV 93.7 95.3 95.2   MCH 32.2 32.0 31.9   MCHC 34.3 33.6 33.5   RDW 13.1 13.2 13.3    205 208   MPV 11.3 10.9 11.5     Lab Results   Component Value Date    CKTOTAL 57 10/31/2016    CKMB 3.9 10/16/2016    TROPONINI 0.05 (H) 03/05/2020    TROPONINI 0.05 (H) 03/05/2020    TROPONINI 0.05 (H) 02/03/2020     Lab Results   Component Value Date    INR 1.3 02/29/2020    INR 1.4 02/01/2020    PROTIME 14.2 (H) 02/29/2020    PROTIME 16.7 (H) 02/01/2020     Lab Results   Component Value Date    TSH 2.890 10/15/2016     Lab Results   Component Value Date    LABA1C 6.5 (H) 02/04/2020     No results found for: EAG  Lab Results   Component Value Date    CHOL 65 03/06/2020    CHOL 91 01/11/2020     Lab Results   Component Value Date    TRIG 115 03/06/2020    TRIG 74 01/11/2020     Lab Results   Component Value Date    HDL 26 03/06/2020    HDL 35 01/11/2020     Lab Results   Component Value Date    LDLCALC 16 03/06/2020    LDLCALC 41 01/11/2020     Lab Results   Component Value Date    LABVLDL 23 03/06/2020    LABVLDL 15 01/11/2020     No results found for: CHOLHDLRATIO    Cardiac Tests:  Telemetry: rate controlled atrial

## 2020-03-09 NOTE — PROGRESS NOTES
[facility]  Critical Care Consult  Daily Progress Note 3/9/2020    Date of Admission: 3/5    EVENTS:   03/05  Presented to the ED with AMS via EMS.  At home, found by wife unresponsive.  EMS glucose 70-given D10. LKW 10 pm last evening.  .  Initial /89.  Unable to obtain NIHSS.  Stroke alert called.  HCT shows subacute right parietal infarct.  Not a candidate for TPA due to LKW time.  IR to assess for thrombectomy ( not a candidate) .  Intubated.  Admitted to NSICU.   On Xarelto for Afib.  Developed hematuria.  Xarelto stopped.  Recently underwent cystoscopy & prostatectomy on 2/27.  Has not restarted Xarelt was due to start the day after admission for his CVA .  Hx EF 35%  03/06  Afebrile. Remains on Levophed & propofol. HCT this am showed new right mesial temporal lobe infarction. Heparin started.    03/07  No issues overnight.  Required 3 units of insulin.  Propofol & Heparin continue.    03/07 No issues overnight, BP remaind stable. Family made patient DNR- limited intubation only  03/08  No issues overnight. Propofol & Heparin infusions continue. Required 9 units of insulin. 03/09 T max 100, vitals stable, no acute events    PHYSICAL EXAM:    BP (!) 120/51   Pulse 60   Temp 97.9 °F (36.6 °C) (Bladder)   Resp 16   Ht 5' 10\" (1.778 m)   Wt 182 lb 12.8 oz (82.9 kg)   SpO2 96%   BMI 26.23 kg/m²     General appearance:  Comfortable. GCS:    1 - Does not open eyes   4 - Moves part of body but does not remove noxious stimulus  1-T - Makes no noise    Pupil size:  Left 4 mm  Right 4 mm  Pupil reaction: Yes  Wiggles fingers: Left No Right No  Hand grasp:   Left absent     Right absent  Wiggles toes: Left No    Right No  Plantar flexion: Left absent    Right absent    CONSTITUTIONAL: no acute distress, lying in hospital bed, intubated   CARDIOVASCULAR: S1 S2, irregular rate and rhythm, no murmur/gallop/rub.  Monitor: A Fib  PULMONARY: no rhonchi/rales/wheezes, no use of accessory muscles, 450/16/5/40,    RENAL: martel to gravity, clear yellow urine  ABDOMEN: soft, nontender, nondistended, nontympanic, normal bowel sounds   SKIN/EXTREMITIES: no rashes/ecchymosis, no edema/clubbing, warm/dry, good capillary refill     LINES: CVC, Arterial placed 3/5    CONSULTS:   Medicine  Neurology  Cardiology  Palliative   Urology      ASSESSMENT/PLAN:       · Neuro:  Bilateral thalamic stroke, R PCA ischemic stroke, blind. Monitor neuro status, Neurosurgery following, Neurology following, stroke protocol, Keppra  · CV: A Fib HX of HTN, HTN, CHF. Monitor hemodynamics. BP goal MAP 65. Metoprolol, digoxin, heparin   · Pulm: Acute respiratory failure. Mechanical ventilation. AC. Monitor RR & SpO2. Duoneb. Daily CXR. Daily ABG. · GI: Dysphagia. Gastric tube. NPO. Tube feeds. Monitor bowel function. · Renal: Hematuria. Urology following, Monitor BUN & Cr. Monitor electrolytes & replace as needed. Monitor urine output. · ID: Unasyn for suspected aspiration  · Endocrine: Hyperglycemia. HX of diabetes. Monitor BS. ISS. · MSK: Deconditioned. ROM. turn & reposition. Routine skin care  · Heme: No acute issues. Monitor CBC. Bowel regime: Glycolax, ducolax suppository. Pain control/Sedation: Propofol, , Tylenol  DVT prophylaxis: SCDs. Heparin drip  GI prophylaxis: Protonix TF  Mouth/Eye care: artificial tears. Peridex. Martel: Keep in place for critical care monitoring of fluid balance. Family update:  Will update when available   Code status: Limited  Disposition:  NSICU    Electronically signed by PAULINO Quintanilla CNP on 3/9/2020 at 11:24 AM

## 2020-03-09 NOTE — PROGRESS NOTES
Physical Therapy  Name: Brandon Moore  Room: 0188/8495-Q  Date: 03/09/20    PT consult received and appreciated. Pt not appropriate for PT intervention multiple days consecutively. DC order at this time, please reconsult if pt has change in status.      Anna Marie Sharma, PT, DPT  [id number]

## 2020-03-09 NOTE — PROGRESS NOTES
Occupational Therapy  OT consult received to eval/treat. Pt has not been appropriate for skilled OT services for multiple days. Will DC order at this time. Please re-consult if any changes occur. Thank you.     Nina OTR/L [id number]

## 2020-03-10 NOTE — PROGRESS NOTES
Results   Component Value Date    WBC 7.5 03/10/2020    RBC 3.65 03/10/2020    HGB 11.7 03/10/2020    HCT 35.4 03/10/2020    MCV 97.0 03/10/2020    MCH 32.1 03/10/2020    MCHC 33.1 03/10/2020    RDW 13.7 03/10/2020     03/10/2020    MPV 11.4 03/10/2020     CMP:    Lab Results   Component Value Date     03/10/2020    K 4.1 03/10/2020    K 3.9 02/05/2020     03/10/2020    CO2 24 03/10/2020    BUN 16 03/10/2020    CREATININE 0.8 03/10/2020    GFRAA >60 03/10/2020    LABGLOM >60 03/10/2020    GLUCOSE 194 03/10/2020    PROT 5.3 03/10/2020    LABALBU 2.6 03/10/2020    CALCIUM 9.0 03/10/2020    BILITOT 0.3 03/10/2020    ALKPHOS 67 03/10/2020    AST 38 03/10/2020    ALT 23 03/10/2020     HgBA1c:    Lab Results   Component Value Date    LABA1C 6.5 02/04/2020     FLP:    Lab Results   Component Value Date    TRIG 115 03/06/2020    HDL 26 03/06/2020    LDLCALC 16 03/06/2020    LABVLDL 23 03/06/2020     MRI Brain:  1. Acute or early subacute bilateral thalamic lacunar infarctions and  infarction of the left mesial temporal lobe. Findings are consistent  with bilateral PCA territory infarctions. Retrospective evaluation of  the CT does demonstrate high-grade stenoses in the P2 segments of the  posterior cerebral arteries. 2. No significant edema, mass effect or midline shift. 3. Chronic right occipital lobe infarction. CTA Head:  Short segment mild stenosis of the proximal internal carotid arteries   bilaterally of less than 50%. No other significant stenosis or occlusion within   the carotid or vertebral arteries of the neck. No significant stenosis, aneurysmal dilatation or large vessel occlusion within   the anterior or posterior intracranial arterial circulation. Moderate to large bilateral pleural effusions in the lung apices. Airspace   consolidation with air bronchograms in the right lung apex may represent   infiltrate/pneumonia. Complete echo 1/12/2020:  Concentric hypertrophy.    PWd is 1.6 500 mg twice daily  Continue statin    IM, cardiology, palliative, and critical care team following    Waylon Griffin DNP, APRN   8:25 AM  3/10/2020

## 2020-03-10 NOTE — PROGRESS NOTES
[facility]  Critical Care Consult  Daily Progress Note 3/10/2020    Date of Admission: 3/5    EVENTS:   03/05  Presented to the ED with AMS via EMS.  At home, found by wife unresponsive.  EMS glucose 70-given D10. LKW 10 pm last evening.  .  Initial /89.  Unable to obtain NIHSS.  Stroke alert called.  HCT shows subacute right parietal infarct.  Not a candidate for TPA due to LKW time.  IR to assess for thrombectomy ( not a candidate) .  Intubated.  Admitted to NSICU.   On Xarelto for Afib.  Developed hematuria.  Xarelto stopped.  Recently underwent cystoscopy & prostatectomy on 2/27.  Has not restarted Xarelt was due to start the day after admission for his CVA .  Hx EF 35%  03/06  Afebrile. Remains on Levophed & propofol. HCT this am showed new right mesial temporal lobe infarction. Heparin started.    03/07  No issues overnight.  Required 3 units of insulin.  Propofol & Heparin continue.    03/07 No issues overnight, BP remaind stable. Family made patient DNR- limited intubation only  03/08  No issues overnight. Propofol & Heparin infusions continue. Required 9 units of insulin. 03/09 T max 100, vitals stable, no acute events  09/10 Wiggling toes to command this AM, T max 100, remains off Levophed     PHYSICAL EXAM:    BP (!) 120/51   Pulse 72   Temp 98.8 °F (37.1 °C) (Bladder)   Resp 20   Ht 5' 10\" (1.778 m)   Wt 182 lb 12.8 oz (82.9 kg)   SpO2 100%   BMI 26.23 kg/m²     General appearance:  Comfortable. GCS:    1 - Does not open eyes   6 - Follows simple motor commands  1-T - Makes no noise      Pupil size:  Left 4 mm  Right 4 mm   Pupil reaction: Yes  Wiggles fingers: Left No Right No  Hand grasp:   Left absent     Right absent  Wiggles toes: Left Yes    Right Yes  Plantar flexion: Left absent    Right absent    CONSTITUTIONAL: no acute distress, lying in hospital bed, intubated   CARDIOVASCULAR: S1 S2, irregular rate and rhythm, no murmur/gallop/rub.  Monitor: A

## 2020-03-10 NOTE — PROGRESS NOTES
Relationships    Social connections     Talks on phone: None     Gets together: None     Attends Christianity service: None     Active member of club or organization: None     Attends meetings of clubs or organizations: None     Relationship status: None    Intimate partner violence     Fear of current or ex partner: None     Emotionally abused: None     Physically abused: None     Forced sexual activity: None   Other Topics Concern    None   Social History Narrative    None       IV:    heparin (porcine) 12 Units/kg/hr (03/10/20 1823)    propofol Stopped (03/09/20 0800)    dextrose         PRN: hydrALAZINE, heparin (porcine), heparin (porcine), sodium chloride flush, acetaminophen **OR** acetaminophen, labetalol, glucose, dextrose, glucagon (rDNA), dextrose, fentanNYL, polyvinyl alcohol    Scheduled:    digoxin  125 mcg Per NG tube Daily    levETIRAcetam  500 mg Oral BID    insulin glargine  10 Units Subcutaneous Nightly    potassium & sodium phosphates  1 packet Per NG tube 4x Daily    sodium chloride flush  10 mL Intravenous 2 times per day    chlorhexidine  15 mL Mouth/Throat BID    insulin lispro  0-18 Units Subcutaneous Q4H    atorvastatin  40 mg Per NG tube Nightly    ampicillin-sulbactam  3 g Intravenous Q6H    sennosides  5 mL Per NG tube Nightly    polyethylene glycol  17 g Per NG tube Daily    pantoprazole  40 mg Intravenous Daily    And    sodium chloride (PF)  10 mL Intravenous Daily       Lab Results   Component Value Date     03/10/2020    K 4.1 03/10/2020    K 3.9 02/05/2020    BUN 16 03/10/2020    CREATININE 0.8 03/10/2020        Lab Results   Component Value Date    HGB 11.7 03/10/2020    HCT 35.4 03/10/2020       Lab Results   Component Value Date    PSA 2.39 02/27/2020    PSA 6.06 (H) 11/22/2016       Review Of Systems:  Unable to be obtained at this time     Physical Exam:  Skin dry, without rashes  Respirations non-labored, intact.   Intubated  Abdomen soft, non-tender, non-distended. Active bowel sounds. Resting. He opens eyes to stimuli. : Normal male genitalia. 14 [place] draining red urine into a gravity bag. There are no clots in the tubing. Testes are normal.        Assessment and Plan:  BPH (S/P TURP--11/22/16/REDO TURP--2/27/20)/Gross hematuria//Elevated PSA  -He underwent a TURP on 11/22/2016. Pathology showed benign prostate tissue without evidence of malignancy. A redo TURP late last month showed benign disease once again. He no longer needs to take Flomax  -Urine culture on 2/1/2020 was negative. Urine cytology on 2/3/2020 was negative  -Hematuria appears to be worsening with the heparin drip. He is being followed closely by cardiology. If his hematuria persists or worsens, his small indwelling catheter will need to be upsized to a much larger, three-way [place]. CBI will need to be initiated as well. For now the catheter will be irrigated manually to maintain patency and this will be monitored closely. -Most recent PSA was normal  -He will be given a voiding trial once he is medically stable, ambulatory, and his hematuria has fully resolved. -We will follow him during his hospital stay.       Tin Ko MD  3/10/2020  7:03 PM

## 2020-03-10 NOTE — PROGRESS NOTES
03/10/20 0917    fentaNYL (SUBLIMAZE) injection 25 mcg  25 mcg Intravenous Q1H PRN Justo Merrill, APRN - CNS   25 mcg at 03/09/20 2305    insulin lispro (HUMALOG) injection vial 0-18 Units  0-18 Units Subcutaneous Q4H Justo Merrill, APRN - CNS   3 Units at 03/10/20 1450    atorvastatin (LIPITOR) tablet 40 mg  40 mg Per NG tube Nightly Justo Merrill, APRN - CNS   40 mg at 03/09/20 2039    polyvinyl alcohol (LIQUIFILM TEARS) 1.4 % ophthalmic solution 1 drop  1 drop Both Eyes Q4H PRN Justo Merrill, APRN - CNS        ampicillin-sulbactam (UNASYN) 3 g ivpb minibag  3 g Intravenous Q6H Justo Merrill, APRN - CNS   Stopped at 03/10/20 1523    sennosides (SENOKOT) 8.8 MG/5ML syrup 5 mL  5 mL Per NG tube Nightly Justo Merrill, APRN - CNS   5 mL at 03/09/20 2039    polyethylene glycol (GLYCOLAX) packet 17 g  17 g Per NG tube Daily Justo Merrill, APRN - CNS   17 g at 03/09/20 0901    pantoprazole (PROTONIX) injection 40 mg  40 mg Intravenous Daily Justo Merrill, APRN - CNS   40 mg at 03/10/20 7987    And    sodium chloride (PF) 0.9 % injection 10 mL  10 mL Intravenous Daily Justo Merrill, APRN - CNS   10 mL at 03/10/20 0939      heparin (porcine) 12 Units/kg/hr (03/10/20 0737)    propofol Stopped (03/09/20 0800)    dextrose         Physical Exam:  BP (!) 120/51   Pulse 77   Temp 98.6 °F (37 °C)   Resp 16   Ht 5' 10\" (1.778 m)   Wt 182 lb 12.8 oz (82.9 kg)   SpO2 100%   BMI 26.23 kg/m²   Wt Readings from Last 3 Encounters:   03/09/20 182 lb 12.8 oz (82.9 kg)   02/29/20 170 lb (77.1 kg)   02/27/20 171 lb (77.6 kg)     Appearance: Sedated, on vent, responding to commands  Skin: Intact, no rash  Head: Normocephalic, atraumatic  ENMT: +ETT, no rhinorrhea  Neck: Supple, no carotid bruits  Lungs: Decreased BS B/L, no wheezing  Cardiac: IRRR, 2/6 systolic murmur  Abdomen: Soft, +bowel sounds  Extremities: Moves all extremities x 4, no lower extremity edema  Neurologic:  He is able to squeeze his right hand but not the left hand. He opens his eyes. Intake/Output:    Intake/Output Summary (Last 24 hours) at 3/10/2020 1536  Last data filed at 3/10/2020 0600  Gross per 24 hour   Intake 1901.9 ml   Output 337 ml   Net 1564.9 ml     No intake/output data recorded.     Laboratory Tests:  Recent Labs     03/08/20  0500 03/09/20  0423 03/10/20  0510    144 146   K 3.8 3.8 4.1   * 109* 110*   CO2 20* 23 24   BUN 11 12 16   CREATININE 0.8 0.9 0.8   GLUCOSE 173* 165* 194*   CALCIUM 8.7 8.9 9.0     Lab Results   Component Value Date    MG 1.8 03/10/2020     Recent Labs     03/08/20  0500 03/09/20  0423 03/10/20  0510   ALKPHOS 53 59 67   ALT 11 14 23   AST 18 27 38   PROT 5.2* 5.4* 5.3*   BILITOT 0.3 0.3 0.3   LABALBU 2.6* 2.7* 2.6*     Recent Labs     03/08/20  0500 03/09/20  0423 03/10/20  0510   WBC 7.4 7.5 7.5   RBC 3.81 3.76* 3.65*   HGB 12.2* 12.0* 11.7*   HCT 36.3* 35.8* 35.4*   MCV 95.3 95.2 97.0   MCH 32.0 31.9 32.1   MCHC 33.6 33.5 33.1   RDW 13.2 13.3 13.7    208 206   MPV 10.9 11.5 11.4     Lab Results   Component Value Date    CKTOTAL 57 10/31/2016    CKMB 3.9 10/16/2016    TROPONINI 0.05 (H) 03/05/2020    TROPONINI 0.05 (H) 03/05/2020    TROPONINI 0.05 (H) 02/03/2020     Lab Results   Component Value Date    INR 1.3 02/29/2020    INR 1.4 02/01/2020    PROTIME 14.2 (H) 02/29/2020    PROTIME 16.7 (H) 02/01/2020     Lab Results   Component Value Date    TSH 2.890 10/15/2016     Lab Results   Component Value Date    LABA1C 6.5 (H) 02/04/2020     No results found for: EAG  Lab Results   Component Value Date    CHOL 65 03/06/2020    CHOL 91 01/11/2020     Lab Results   Component Value Date    TRIG 115 03/06/2020    TRIG 74 01/11/2020     Lab Results   Component Value Date    HDL 26 03/06/2020    HDL 35 01/11/2020     Lab Results   Component Value Date    LDLCALC 16 03/06/2020    LDLCALC 41 01/11/2020     Lab Results   Component Value Date    LABVLDL 23 03/06/2020    LABVLDL 15 01/11/2020     No temporal lobe,    Impression:  1. CVA with acute right thalamic stroke (recently off [address] d/t hematuria)  2. Persistent atrial fibrillation -- rate controlled  3. Acute respiratory failure -- intubated  4. Chronic HFrEF, he appears euvolemic, off heart failure medications  5. Moderate mitral regurgitation  6. Chronic LBBB  7. History of HTN  8. HLD  9. DM -- on insulin  10. Anemia -- most recent Hgb 11.4  11. History of hematuria -- follows with urology started having bright red blood  Plan:  - Off the pressores, on heparin for anticoagulation   We may have to discontinue heparin if he continues to bleed- Resume GDMT once hemodynamics stable, today his blood pressures are on the softer side and will avoid adding vasodilator therapy due to acute stroke. If okay with the neurology service then we can reintroduce heart failure medications.  -Continue low-dose IV diuretic therapy as needed  Continue digoxin 125 mcg IV daily and PRN Toprol 2.5 mg every 6 hours for rate control  - Monitor telemetry  - Care per neurology and ICU team, input noted. - D/w the family who are the bedside,. His overall prognosis remains poor due to recurrent hematuria and he may not be a candidate for any anticoagulation therapy if he continues to bleed. Risks of anticoagulation therapy may outweigh the benefits at this point. Kary Cohn MD., [facility] [place].   [facility]) Cardiology

## 2020-03-10 NOTE — PLAN OF CARE
Problem: Falls - Risk of:  Goal: Will remain free from falls  Description: Will remain free from falls  3/10/2020 0853 by Briseyda Morris RN  Outcome: Met This Shift     Problem: Falls - Risk of:  Goal: Absence of physical injury  Description: Absence of physical injury  3/10/2020 0853 by Briseyda Morris RN  Outcome: Met This Shift     Problem: Risk for Impaired Skin Integrity  Goal: Tissue integrity - skin and mucous membranes  Description: Structural intactness and normal physiological function of skin and  mucous membranes.   Outcome: Met This Shift

## 2020-03-11 NOTE — PROGRESS NOTES
Visit to patients room regarding hospice consult. No family was present. Spoke with Stephanie Mora, WILL who states wife and daughter left to eat but unsure if went to cafeteria or out of the hospital.  Stephanie Mora states son was coming after work and possibly family would return then. I did place call to wife Vilma's phone and received voicemail, left a message. I called to update RN and received a daughters name and phone- April. I spoke with April and they have left the hospital and plan to return after 1600. I will follow up at that time.

## 2020-03-11 NOTE — PROGRESS NOTES
Liaison Informational Visit Note      Referral received from nursing           Call back number        Patient Name: Brandon Moore   :  1940  MRN:  [id number]    [address] date:  3/5/2020    Admitted from: [facility] Admitting Physician:  Radha Garg MD   PCP:  Teodora Krishnamurthy MD      Primary Insurance: Payor: Genoveva Abhi /  /  /    Secondary Insurance:  unknown    Emergency Contact:      Contact/Relation:   /         Phone:       Contact/Relation:   /     Phone:     Advance Directive  Advance directives received No  Patient has completed an advance directive   Discussed with: Family member  DPOA-HC Name-Relation:    Phone:       Terminal Diagnosis stroke, end stage CHF per Chip Maurer NP      Current Hospital Problem List:   Patient Active Problem List   Diagnosis Code    PFO (patent foramen ovale) Q21.1    Diabetes mellitus type 2, uncontrolled ([address] E11.65    Essential hypertension I10    Hyperlipidemia LDL goal <100 E78.5    Gross hematuria R31.0    Pneumonia due to infectious organism J18.9    Paroxysmal atrial fibrillation ([address] I48.0    Chronic combined systolic and diastolic congestive heart failure ([address] I50.42    Cerebrovascular accident (CVA) ([address] I63.9    Acute respiratory failure with hypoxia and hypercapnia ([address] J96.01, J96.02    Acute respiratory failure ([address] J96.00    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

## 2020-03-11 NOTE — PROGRESS NOTES
The patient's family has decided they wanted to speak with hospice and decided on hospice of the [place]. Referral called.

## 2020-03-11 NOTE — PROGRESS NOTES
Palliative Medicine   Progression of Care     Patient in bed. Family has gone home for the day. Patient able to open his eyes gently when asked. Able to grasp my hand with his right hand. Able to nod his head that he could hear me. [facility] [address] following at this time. We will follow along closely to see what the family decides.       Palliative Care following closely for support of patient and family   Nicolas Sanchez APRN-CNP, AGACNP-BC

## 2020-03-11 NOTE — PROGRESS NOTES
dysfunction. Apically tethered mitral valve leaflets, posterior >> anterior. Moderate 2-3 + mitral regurgitation with eccentric posteriorly directed jet. MR ERO is 0.2 cm2. MR RV 26 ml.   MR rad is 0.8 cm. Mild tricuspid regurgitation. Mild pulmonic regurgitation. When compared to imaging completed in 2015 the LVEF has decreased from 60   % to 30 % and with development of secondary valvular regurgitation. Repeat CT Head 3/9/20  1. Redemonstration of areas of hypoattenuation involving medial right  and left temporal lobes which suggest areas of evolving stroke. This  finding appears similar since prior. No evidence of hemorrhagic  transformation. 2. Redemonstration of stable areas of hypoattenuation involving right  and left thalami related to evolving areas of recent stroke. Continued  follow-up could be helpful for further evaluation. EEG 3/6: This EEG shows a moderate to severe diffuse encephalopathy. No epileptiform activity or lateralizing signs are seen. All labs and imaging reviewed independently today.     Assessment:     Patient presented to ED after being found unresponsive seemingly having a general tonic-clonic seizure     Patient has a history of A. fib but was off oral anticoagulation for about 1 month due to gross hematuria    On admission, patient was found to have bilateral thalamic strokes as well as bilateral temporal and right occipital lobe strokes      Neurologically improving and following commands     EEG consistent with moderate to severe diffuse encephalopathy with no seizure activity noted   Now receiving IV heparin     Plan:     Continue supportive care in ICU   Given his bi-hemispheric strokes, prognosis for meaningful neurological outcome is poor, however he is clinically improving today     Continue Keppra 500 mg twice daily  Continue statin    IM, cardiology, palliative, and critical care team following    Victorina Sanders, ARINA, APRN   10:20 AM  3/11/2020

## 2020-03-11 NOTE — CARE COORDINATION
3/11 HOV consulted. Continue supportive care in ICU till meet [facility]. palliative, and critical care team following also.              Chris Schrader RN CM

## 2020-03-11 NOTE — PROGRESS NOTES
[facility]uro Science Intensive Care Unit  Critical Care  Daily Progress Note 3/11/2020    Date of Admission: 03/05/20    CC: Follow up for stroke    HOSPITAL COURSE/OVERNIGHT EVENTS:    03/05  Presented to the ED with AMS via EMS.  At home, found by wife unresponsive.  EMS glucose 70-given D10. LKW 10 pm last evening.  .  Initial /89.  Unable to obtain NIHSS.  Stroke alert called.  HCT shows subacute right parietal infarct.  Not a candidate for TPA due to LKW time.  IR to assess for thrombectomy ( not a candidate) .  Intubated.  Admitted to NSICU. On Xarelto for Afib.  Developed hematuria.  Xarelto stopped.  Recently underwent cystoscopy & prostatectomy on 2/27.  Has not restarted Xarelt was due to start the day after admission for his CVA .  Hx EF 35%  03/06  Afebrile. Remains on Levophed & propofol. HCT this am showed new right mesial temporal lobe infarction. Heparin started.    03/07  No issues overnight.  Required 3 units of insulin.  Propofol & Heparin continue.    03/07 No issues overnight, BP remaind stable. Family made patient DNR- limited intubation only  03/08  No issues overnight.  Propofol & Heparin infusions continue. Required 9 units of insulin.    03/09 T max 100, vitals stable, no acute events  03/10 Wiggling toes to command this AM, T max 100, remains off Levophed     PHYSICAL EXAM:  BP (!) 113/51   Pulse 67   Temp 98.6 °F (37 °C) (Temporal)   Resp 10   Ht 5' 10\" (1.778 m)   Wt 181 lb 3.5 oz (82.2 kg)   SpO2 99%   BMI 26.00 kg/m²     Intake/Output Summary (Last 24 hours) at 3/11/2020 1156  Last data filed at 3/11/2020 0813  Gross per 24 hour   Intake 1671 ml   Output 2230 ml   Net -559 ml     General appearance:  Comfortable. Pain Description: denies. NEUROLOGIC:   RASS Score:  0  GCS:  10T  3 - Opens eyes to loud noise or command   6 - Follows simple motor commands  1 - Makes no noise  - intubated.       Pupil size:  Left 3 mm  Right 3 mm  Pupil reaction: Yes   PERRLA  Wiggles fingers: Left   Yes slight  Right Yes slight  Hand grasp:   Left: Yes  slight   Right    Yes slight  Wiggles toes: Left   Yes Right  Yes  Plantar flexion: Left  None  Right   None  Ecchymotic areas ot bilateral eyes. CONSTITUTIONAL: No acute distress, lying in hospital bed. CARDIOVASCULAR: S1 S2, irregular rate, irregular rhythm, no murmur/gallop/rub. Monitor:  Afib. PULMONARY: Bilaterally course. No rhonchi/rales/wheezes, no use of accessory muscles. Intubated. Mechanical ventilation:  VT:450 ml. Mode: AC. Rate:14 bpm.  FiO2: 40%. Peep 5 cm. Estee Lukes Peep 5 cm. PF ratio: 239. Secretions: Minimal secretions. RENAL  [place] to gravity, bloody urine. Fluid balance for previous 24 hours:  - 559 ml. ABDOMEN: Soft, nontender, nondistended, nontympanic, normal bowel sounds. NGT. Tube feedings:  Diabetic formula 1.5 at 50 ml per hour. No reported vomiting. BM 03/09. MUSCULOSKELETAL:  Moves all extremities weakly. SKIN/EXTREMITIES: No rashes/ecchymosis, no edema/clubbing, warm/dry, good capillary refill. LINES:   Right radial arterial line. Day 6. Good curve. Left subclavian TLC. Day 6. 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 Peridex. Jarred Dempsey in place for critical care monitoring of fluid balance.    Ancillary consults:  Neurology.  Medicine.  Palliative medicine. Cardiology. Urology. Patient/Family update:  Family in room.  Questions answered.  Support given.    Code status:  Limited code-meds only.     Disposition:  NSICU.      Electronically signed by Medina Pino RN MSN APRN-NP [facility] NP  CCNS CCRN 3/11/2020 12:12 PM

## 2020-03-11 NOTE — PLAN OF CARE
Problem: Falls - Risk of:  Goal: Will remain free from falls  Outcome: Met This Shift     Problem: Falls - Risk of:  Goal: Absence of physical injury  Outcome: Met This Shift     Problem: Risk for Impaired Skin Integrity  Goal: Tissue integrity - skin and mucous membranes  Outcome: Met This Shift     Problem: HEMODYNAMIC STATUS  Goal: Patient has stable vital signs and fluid balance  Outcome: Ongoing     Problem: ACTIVITY INTOLERANCE/IMPAIRED MOBILITY  Goal: Mobility/activity is maintained at optimum level for patient  Outcome: Ongoing     Problem: COMMUNICATION IMPAIRMENT  Goal: Ability to express needs and understand communication  Outcome: Not Met This Shift

## 2020-03-12 NOTE — PROGRESS NOTES
comfort    Family has requested [facility] transfer for short term symptom management, if appropriate. Family understands and agrees with patient receiving medications such as Morphine, Robinul and Ativan to help control symptoms while at the [facility]. Yes Discussed symptoms with Fercho Pierce NP  Yes Fercho Pierce NP, has determined is eligible for inpatient hospice level of care  No This patient is eligible for routine Hospice services; Yes Attending physician, Dr. Nicole Araya to be notified of orders and changes to plan of care by charge nurse/ bedside RN- they are updated that patient is transferring to the [facility] and require discharge order. RN instructed to leave IV and martel in place. Intake updated and received transfer time of 1900. [facility] trans set up for transport at 1900. Report called to the [facility].     Electronically signed by Claire Elmore RN on 3/12/2020 at 1:37 PM

## 2020-03-12 NOTE — PROGRESS NOTES
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 Patient is currently sedated but is moving during exam. 3/7- Neurology spoke to family. All family in attendance for meeting.      Goals of care: provide comfort care/support/palliation/relieve suffering and achievement of a peaceful death- Hospice following closely  Code Status:DNR-CC- for compassionate extubation   Advanced Directives: none  Surrogate/Legal NOK: Spouse  Contacts:  Crystal Heading [phone]    Spiritual assessment: No spiritual distress identified   Bereavement and grief: to be determined    Past Medical History:   Diagnosis Date    [facility]rial [facility] ([facility]     CHF (congestive heart failure) ([facility]     Diabetes mellitus ([facility]     Hypertension     Legal blindness     peripheral vison intact       Past Surgical History:   Procedure Laterality Date    BACK SURGERY      CARPAL TUNNEL RELEASE      CYSTOSCOPY N/A 2/27/2020    CYSTOSCOPY RETROGRADE PYELOGRAM CLOT EVACUATIOn,  RESECTION  OF PROSTATE GLAND WITH NIÑO INSERTION performed by Donavon Draper MD at [address] [place] [address]  05/2018    [address] SKIN CANCER EXCISION  04/2019    TURP  11/22/2016       Family History   Problem Relation Age of Onset    Stroke Mother     Cancer Sister      Social history:  Waldo status: unknown   Marital status:   Living status: with family:  spouse   Work history: [employer] x 40 years     History obtain from EMR    Current Medications:  Inpatient medications reviewed: yes  Home Medications reviewed: yes    No Known Allergies    Review of Systems:  patient intubated - to be compassionately extubated, cognitive decline after CVA    Objective:   PHYSICAL EXAM:   Vitals:    BP (!) 113/51   Pulse 54   Temp 98.8 °F (37.1 °C) (Temporal)   Resp 14   Ht 5' 10\" (1.778 m)   Wt 181 lb 3.5 oz (82.2 kg)   SpO2 100%   BMI 26.00 kg/m²   Gen: elderly male, NAD  HEENT: laceration left I63.22]      CVA:  -MRI completed, Neuro spoke to the family     Seizure:  -Probable general clonic tonic seizure per neurology  -Keppra as ordered    Acute respiratory failure with hypoxia and hypercapnia:  -Intubated  -Treatment of pneumonia    Aspiration pneumonia:  -Rocephin x1, Unasyn currently    Atrial fibrillation:  -Anticoagulation held recently secondary to hematuria; on heparin gtt at this time   -Follow    Palliative Care Encounter/Recommendations:  3/12/2020  The family of Gladys Perez are all assembled in the patient's room at this time. Plan in place and family has decided to proceed with compassionate extubation at this time. The family is praying at the bedside. Courtesy tray in room. The orders have been placed per protocol for the compassionate extubation. [facility] here with me in the room. Family stated appreciation of compassionate care. The patient has been changed to [facility] at this time. Patient will possibly go to the [facility] with HOTV depending on how the patient proceeds after the extubation. Palliative Care will follow along closely and continue to offer emotional support to the family. 3/7/2020  Long discussion held with wife, patient's brother Kyle Washington, daughter, son and daughter in law Griselda. Family has had the chance to speak with neurology. Daughter who is a disease prevention nurse stated although it was nice to speak to the NP for neurology, she would like to speak to an actual physician. Awaiting possibility of talking to cardiology at this time. Palliative Care NP and  Palliative Care SW both spoke during the meeting. SW spoke of how we put it all together for the family, and offered continued support. I introduced the different CODE status possibilities to all in attendance. Patient's wife is leaning toward DNR-CCA at this time. Family to discuss further privately.   Explained to family that they may let anyone on the case know if they wish to change code status at any time. DNR Pamphlet outlined and given to wife; brother of patient, son also have DNR pamphlets with the different LEVELS explained. Will follow along closely for the support of patient and family. 3/5/2020  Palliative medicine services introduced to patient's daughter-in-law who is at bedside. Support provided through active listening and life recall. Palliative medicine will continue to follow and await further input from neurology, neurosurgery and results from the MRI. Family Meeting: 3/12/2020  Participants:Extended Family: wife, daughter,son, daughter in law, patient's brother, close family friends, grandchildren    Family meeting was held to discuss:Compassionate extubation and goals of care     Discharge planning: Possibly Hospice House     Discussed patient and the plan of care with the other IDT members of Palliative Care, and with family and floor nurse. Referrals to: hospice    Time/Communication  Greater than 51% of time spent, total 25 minutes in counseling and coordination of care at the bedside regarding goals of care and symptom management. Compassionate extubation, what to possibly expect, Hospice     Monica BOB-CNP, AGACNP-BC   Palliative Medicine    Thank you for allowing Palliative Medicine to participate in the care of Fernanda Kayser. Note: This report was completed using computerSkyBitz voiced recognition software. Every effort has been made to ensure accuracy; however, inadvertent computerized transcription errors may be present.

## 2020-03-12 NOTE — PROGRESS NOTES
Saqib Mott arrived to transport the patient to the [facility]. Paperwork sent with transport. RN called receiving WILL Valadez at the [facility] for report. The patient's family was updated at bedside and proceeded to follow transport to the [facility]. The patient left the unit with no signs of pain.

## 2020-03-12 NOTE — PROGRESS NOTES
Met with family and they are ready to proceed with comfort care. They are in agreement with removing patient from the ventilator. I spoke with Galindo Carmichael NP Palliative regarding families plan to compassionately extubate patient- she will come to meet with family.

## 2020-03-12 NOTE — PLAN OF CARE
Problem: Falls - Risk of:  Goal: Will remain free from falls  Description: Will remain free from falls  Outcome: Met This Shift  Goal: Absence of physical injury  Description: Absence of physical injury  Outcome: Met This Shift     Problem: Risk for Impaired Skin Integrity  Goal: Tissue integrity - skin and mucous membranes  Description: Structural intactness and normal physiological function of skin and  mucous membranes.   Outcome: Met This Shift

## 2020-03-12 NOTE — PROGRESS NOTES
None   Lifestyle    Physical activity     Days per week: None     Minutes per session: None    Stress: None   Relationships    Social connections     Talks on phone: None     Gets together: None     Attends L[employer] service: None     Active member of club or organization: None     Attends meetings of clubs or organizations: None     Relationship status: None    Intimate partner violence     Fear of current or ex partner: None     Emotionally abused: None     Physically abused: None     Forced sexual activity: None   Other Topics Concern    None   Social History Narrative    None       Scheduled Meds:   levETIRAcetam  500 mg Per NG tube BID    digoxin  125 mcg Per NG tube Daily    insulin glargine  10 Units Subcutaneous Nightly    potassium & sodium phosphates  1 packet Per NG tube 4x Daily    sodium chloride flush  10 mL Intravenous 2 times per day    chlorhexidine  15 mL Mouth/Throat BID    insulin lispro  0-18 Units Subcutaneous Q4H    atorvastatin  40 mg Per NG tube Nightly    sennosides  5 mL Per NG tube Nightly    polyethylene glycol  17 g Per NG tube Daily    pantoprazole  40 mg Intravenous Daily    And    sodium chloride (PF)  10 mL Intravenous Daily     Continuous Infusions:   heparin (porcine) 12 Units/kg/hr (03/11/20 9532)    dextrose       PRN Meds:.opium-belladonna, hydrALAZINE, heparin (porcine), heparin (porcine), sodium chloride flush, acetaminophen **OR** acetaminophen, labetalol, glucose, dextrose, glucagon (rDNA), dextrose, fentanNYL, polyvinyl alcohol    ASSESSMENT:    Patient Active Problem List   Diagnosis    PFO (patent foramen ovale)    Diabetes mellitus type 2, uncontrolled [facility] [address]    Essential hypertension    Hyperlipidemia LDL goal <100    Gross hematuria    Pneumonia due to infectious organism    Paroxysmal atrial fibrillation (HCC)    Chronic combined systolic and diastolic congestive heart failure ([address]    C[facility]brovascular accident (CVA) [facility] [address] [facility]    Acute respiratory failure with hypoxia and hypercapnia (HCC)    Acute respiratory failure (HCC)    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  3/12/2020  8:04 AM

## 2020-03-12 NOTE — PROGRESS NOTES
RN updated Dr. Andrea Nunez and consulted physicians about the families decision to go with hospice care and extubate the patient.

## 2020-03-12 NOTE — PROGRESS NOTES
eyes to loud noise or command   6 - Follows simple motor commands  1 - Makes no noise  - intubated. Pupil size:  Left 3 mm  Right 3 mm  Pupil reaction: Yes   PERRLA  Wiggles fingers: Left   Yes slight  Right Yes slight  Hand grasp:   Left: Yes  slight   Right    Yes slight  Wiggles toes: Left   Yes Right  Yes  Plantar flexion: Left  None  Right   None  Ecchymotic areas ot bilateral eyes. CONSTITUTIONAL: No acute distress, lying in hospital bed. CARDIOVASCULAR: S1 S2, irregular rate, irregular rhythm, no murmur/gallop/rub. Monitor:  Afib. PULMONARY: Bilaterally course. No rhonchi/rales/wheezes, no use of accessory muscles. Intubated. Mechanical ventilation:  VT:450 ml. Mode: AC. Rate:14 bpm.  FiO2: 40%. Peep 5 cm. Deya Maudlin Peep 5 cm. PF ratio 221. Secretions: Minimal secretions. RENAL  [place] to gravity, bloody urine. Fluid balance for previous 24 hours:  + 881 ml. ABDOMEN: Soft, nontender, nondistended, nontympanic, normal bowel sounds. NGT. Tube feedings:  Diabetic formula 1.5 at 50 ml per hour. No reported vomiting. BM 03/09. MUSCULOSKELETAL:  Moves all extremities weakly. SKIN/EXTREMITIES: No rashes/ecchymosis, no edema/clubbing, warm/dry, good capillary refill. LINES:   Right radial arterial line. Day 7. Good curve. Left subclavian TLC. Day  7.       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 *    Neuro:  Acute bilateral thalamic stroke. Right PCA distribution stroke. Left temporal lobe stroke. Diffuse encephalopathy. Hx of being legally blind.                Monitor neuro status.                Neurology following.                Stroke education.  Stroke protocol.                Keppra.             CV:  Afib. EF 30%.  Hx of HTN, AFib & CHF.                 Monitor hemodynamics.                BP goal  less than 220 mm Hg.                PRN hydralzine & labetalol.                Heparin.                Lanoxin.                Statin.                Pulm:  Acute respiratory failure              Intubated.                Mechanical ventilation.                  Wean as tolerated.                Pulmonary toilet.                Monitor RR & SpO2.                Daily CXR & ABG.    GI: Inability to eat. Dysphagia.   BMI 26.                Monitor bowel function.                NPO              NGT. Tube feedings stopped.       Bowel regime. Renal:  Hematuria. Recent cystoscopy with prostate resection.                Monitor BUN & Cr,  electrolytes & replace as needed.             Monitor I & O.                 [place].     Urology following. ID: No acute issues. Possible UTI/aspiration pneumonia.             Unasyn completed. Endocrine:   Hyperglycemia. Hx of diabetes                Monitor BS.                ISS.     MSK: Deconditioned.   Iman Mask.             TBEK & reposition.               RW & OT when able.               Monitor for skin breakdown.    Heme: No acute issues.             Monitor CBC.       Bowel regime: Senna. Glycolax. Pain control/Sedation: Propofol.   Fentanyl. Tylenol.    DVT prophylaxis: SCDs.  No Lovenox/heparin at this time.      GI prophylaxis: .  Protonix.  TF.   Glucose protocol:  ISS  Mouth/Eye care: Artificial tears. Peridex. Jennifer Kathariner in place for critical care monitoring of fluid balance.    Ancillary consults:  Neurology.  Medicine.  Palliative medicine. Cardiology. Urology. Patient/Family update:  Family to meet with Hospice today. .    Code status:  Limited code-meds only.     Disposition:  NSICU.      Electronically signed by Magdalene Moraes RN MSN APRN-NP [facility] NP  CCNS CCRN 3/12/2020 9:39 AM

## 2020-03-12 NOTE — PROGRESS NOTES
INPATIENT CARDIOLOGY FOLLOW-UP    Name: Dedra Sewell    Age: 67 y.o. Date of Admission: 3/5/2020  2:09 AM    Date of Service: 3/12/2020    Chief Complaint: Follow-up for Low EF. Admitted with CVA. Bradycardia. Known AF    Interim History:  No new cardiac events, is off sedation and responding and follows commands, he is on heparin has been having hematuria for the past few days, today, urine is light after the irrigation. As per the nurse, family is considering withdrawing support and going with Hospice. AF on the monitor.      Review of Systems:   Cardiac: As per HPI  General: No fever, chills  Pulmonary: As per HPI  HEENT: No visual disturbances, difficult swallowing  GI: No nausea, vomiting  Endocrine: No thyroid disease or DM  Musculoskeletal: NGUYEN x 4, no focal motor deficits  Skin: Intact, no rashes  Neuro/Psych: No headache or seizures    Problem List:  Patient Active Problem List   Diagnosis    PFO (patent foramen ovale)    Diabetes mellitus type 2, uncontrolled ([address]    Essential hypertension    Hyperlipidemia LDL goal <100    Gross hematuria    Pneumonia due to infectious organism    Paroxysmal atrial fibrillation (HCC)    Chronic combined systolic and diastolic congestive heart failure ([address]    Cerebrovascular accident (CVA) ([address]    Acute respiratory failure with hypoxia and hypercapnia (HCC)    Acute respiratory failure ([address]    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 ([address]    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 ([address]    Acute ischemic VBA thalamic stroke, right (HCC)       Allergies:  No Known Allergies    Current Medications:  Current Facility-Administered Medications   Medication Dose Route Frequency Provider Last Rate Last Dose    levETIRAcetam (KEPPRA) 100 MG/ML solution 500 mg  500 mg Per NG tube BID Dashawn Arroyo, APRN - CNS   500 mg at 03/11/20 8473    digoxin (LANOXIN) tablet 125 mcg chlorhexidine (PERIDEX) 0.12 % solution 15 mL  15 mL Mouth/Throat BID Gail Jesús, APRN - CNS   15 mL at 03/11/20 2153    fentaNYL (SUBLIMAZE) injection 25 mcg  25 mcg Intravenous Q1H PRN Gail Jesús, APRN - CNS   25 mcg at 03/11/20 0831    insulin lispro (HUMALOG) injection vial 0-18 Units  0-18 Units Subcutaneous Q4H Gail Jesús, APRN - CNS   6 Units at 03/12/20 0209    atorvastatin (LIPITOR) tablet 40 mg  40 mg Per NG tube Nightly Gail Raghavendrach, APRN - CNS   40 mg at 03/11/20 2153    polyvinyl alcohol (LIQUIFILM TEARS) 1.4 % ophthalmic solution 1 drop  1 drop Both Eyes Q4H PRN Gail Raghavendrach, APRN - CNS   1 drop at 03/11/20 0953    sennosides (SENOKOT) 8.8 MG/5ML syrup 5 mL  5 mL Per NG tube Nightly Gaillisandro Espinosa, APRN - CNS   5 mL at 03/11/20 2154    polyethylene glycol (GLYCOLAX) packet 17 g  17 g Per NG tube Daily Gail Jesús, APRN - CNS   17 g at 03/11/20 0801    pantoprazole (PROTONIX) injection 40 mg  40 mg Intravenous Daily Gail Jesús, APRN - CNS   40 mg at 03/11/20 0759    And    sodium chloride (PF) 0.9 % injection 10 mL  10 mL Intravenous Daily Gail Jesús, APRN - CNS   10 mL at 03/11/20 0758      heparin (porcine) 12 Units/kg/hr (03/11/20 2252)    dextrose         Physical Exam:  BP (!) 113/51   Pulse 65   Temp 98.7 °F (37.1 °C) (Temporal)   Resp 21   Ht 5' 10\" (1.778 m)   Wt 181 lb 3.5 oz (82.2 kg)   SpO2 91%   BMI 26.00 kg/m²   Wt Readings from Last 3 Encounters:   03/11/20 181 lb 3.5 oz (82.2 kg)   02/29/20 170 lb (77.1 kg)   02/27/20 171 lb (77.6 kg)     Appearance: Sedated, on vent, responding to commands  Skin: Intact, no rash  Head: Normocephalic, atraumatic  ENMT: +ETT, no rhinorrhea  Neck: Supple, no carotid bruits  Lungs: Decreased BS B/L, no wheezing  Cardiac: IRRR, 2/6 systolic murmur  Abdomen: Soft, +bowel sounds  Extremities: Moves all extremities x 4, no lower extremity edema  Neurologic:  He is able to squeeze his right hand but not the left hand. 01/11/2020     No results found for: CHOLHDLRATIO    Cardiac Tests:  Telemetry: rate controlled atrial fibrillation in the 50s, no other arrhythmias over night. Vitals: /58, HR 55>>> 130/58 with heart rate of 66>>> 120/51 with heart rate of 64 >> 140/69 with heart rate of 77>>>131/59, HR 58>>134/60, HR 65    Labs reviewed: Normal renal functions and lytes>>, LDL 16. Chol 65, H/H 11.8/34.4>> 12.2/36.3>> 12/35.8, liver function normal>>> BMP normal, H&H 11.7/35.4>>11.1/34.5>>>10.1/30.9, platelets 024, WBC 7.5 liver function normal.    I/O>> net +772, low output     Echocardiogram: 1/2020 (Dr. Biju Che)   Concentric hypertrophy.   PWd is 1.6 cm.   Severe left ventricular systolic dysfunction.   Visually estimated LVEF is 30 %.   Severe global hypokinesis.   Diastolic pattern consistent with atrial fibrillation.   Dilated right ventricle.   RVEDD is 4.8 cm.   Mild right ventricular systolic dysfunction.   Apically tethered mitral valve leaflets, posterior >> anterior.   Moderate 2-3 + mitral regurgitation with eccentric posteriorly directed   jet.   MR ERO is 0.2 cm2.   MR RV 26 ml.   MR rad is 0.8 cm.   Mild tricuspid regurgitation.   Mild pulmonic regurgitation.   When compared to imaging completed in 2015 the LVEF has decreased from 60   % to 30 % and with development of secondary valvular regurgitation.     Lexiscan nuclear stress test: 2/2020  1. Perfusion imaging suggests a small area of mildly reversible   ischemia at the inferolateral apical wall without a clear   corresponding segmental wall motion abnormality in this globally   hypokinetic left ventricle. This reduces the positive predictive value   of the perfusion findings. 2. Ejection fraction is 18 %. 3. Dilated LV. Global hypokinesis.      EEG showed moderate to severe diffuse encephalopathy no epileptiform activity was noted     CT head done on 3/6/2020: Interval development of an infarction in the right mesial temporal lobe. Progression of cytotoxic edema in the subacute infarctions involving the bilateral thalami and the left mesial temporal lobe,    Impression:  1. CVA with acute right thalamic stroke (recently off [address] d/t hematuria)  2. Persistent atrial fibrillation -- rate controlled  3. Acute respiratory failure -- intubated  4. Chronic HFrEF, he appears euvolemic, off heart failure medications  5. Moderate mitral regurgitation  6. Chronic LBBB  7. History of HTN  8. HLD  9. DM -- on insulin  10. Anemia -- most recent Hgb 11.4>>10.1  11. History of hematuria -- follows with urology started having bright red blood  Plan:  - Off the pressores, on heparin for anticoagulation and has bleeding/hematuria for the past 72 hours. Urology needs to seem him. If he continues to bleed then he is not a candidate for long term anticoagulation. Urology is planning cysto today, if he continues to have hematuria and would recommend stopping heparin due to falling Hb level. Long term anticoagulation is recommended if he continues to have bleeding issues.  -Resume GDMT once hemodynamics stable, today his blood pressures are on the softer side and will avoid adding vasodilator therapy due to acute stroke. If okay with the neurology service then we can reintroduce heart failure medications.  -Continue low-dose IV diuretic therapy as needed  Continue digoxin 125 mcg IV daily and PRN Toprol 2.5 mg every 6 hours for rate control  - Monitor telemetry  - Care per neurology and ICU team, input noted. - No family members at the bed side. His overall prognosis remains poor due to recurrent hematuria and he may not be a candidate for any anticoagulation therapy if he continues to bleed. Risks of anticoagulation therapy may outweigh the benefits at this point.  - if family goes with hospice care then will sign off, please call us with any updates on his care. Niranjan Gooden MD., [facility].   [address] Cardiology

## 2020-03-13 NOTE — DISCHARGE SUMMARY
Physician Discharge Summary     Patient ID:  Dedra Sewell  [id number]  89 y.o.  1940    Admit date: 3/5/2020    Discharge date and time: 3/12/2020  7:30 PM     Admitting Physician: Kayy Nash MD     Discharge Physician: Christiano Robles MD    Admission Diagnoses: Cerebrovascular accident (CVA), unspecified mechanism [facility]) [I63.9]    Discharge Diagnoses: SAME    Admission Condition: poor    Discharged Condition: poor    Indication for Admission: Acute CVA    Hospital Course: Patient admitted for CVA. Ultimately declined and family made comfort care and discharged with hospice. Consults: neurology, cardiology, CC    Significant Diagnostic Studies: imaging    Treatments: see chart    Discharge Exam:  See note    Disposition: long term care facility    In process/preliminary results:  Outstanding Order Results     No orders found from 2/5/2020 to 3/6/2020.           Patient Instructions:   Discharge Medication List as of 3/12/2020  8:03 PM      CONTINUE these medications which have NOT CHANGED    Details   rivaroxaban (XARELTO) 20 MG TABS tablet Take 1 tablet by mouth Daily with supper for 1 day [id number] 11/21, Disp-28 tablet, R-0Sample      ondansetron (ZOFRAN ODT) 4 MG disintegrating tablet Take 1 tablet by mouth every 8 hours as needed for Nausea or Vomiting, Disp-10 tablet, R-0Print      ibuprofen (ADVIL;MOTRIN) 800 MG tablet Take 1 tablet by mouth every 8 hours as needed for Pain, Disp-30 tablet, R-0Normal      phenazopyridine (PYRIDIUM) 100 MG tablet Take 1 tablet by mouth 3 times daily as needed for Pain, Disp-30 tablet, R-1Normal      isosorbide mononitrate (IMDUR) 30 MG extended release tablet Take 1 tablet by mouth daily, Disp-30 tablet, R-3Normal      furosemide (LASIX) 40 MG tablet Take 0.5 tablets by mouth daily, Disp-60 tablet, R-3Normal      atorvastatin (LIPITOR) 40 MG tablet Take 40 mg by mouth dailyHistorical Med      digoxin (LANOXIN) 125 MCG tablet TAKE 1 TABLET BY MOUTH DAILY, Disp-90 tablet, R-3**Patient requests 90 days supply**Normal      metoprolol succinate (TOPROL XL) 25 MG extended release tablet Take 0.5 tablets by mouth 2 times daily, Disp-30 tablet, R-3Normal      SPIRONOLACTONE PO Take 12.5 mg by mouth daily Historical Med      sacubitril-valsartan (ENTRESTO) 24-26 MG per tablet Take 1 tablet by mouth 2 times dailyHistorical Med      LEVEMIR FLEXTOUCH 100 UNIT/ML injection pen Inject 12 Units into the skin daily , R-12, DAWHistorical Med      docusate sodium (COLACE) 100 MG capsule Take 100 mg by mouth daily Historical Med      sitagliptan-metformin (JANUMET)  MG per tablet Take 1 tablet by mouth 2 times daily (with meals).   Historical Med         STOP taking these medications       rivaroxaban (XARELTO) 20 MG TABS tablet Comments:   Reason for Stopping:             Activity: bedrest  Diet: none  Wound Care: as directed    Follow-up with hospice    Signed:  Hilario Cadet MD  3/13/2020  6:29 AM

## 2024-04-05 NOTE — PLAN OF CARE
Chart reviewed.  Patient is now a [facility] and terminally extubated--hospice consulted    Neuro signing off    Nakita Kang  2:40 PM PROCEDURES:  Excision, mass, tibia 05-Apr-2024 09:25:34 Soft tissue mass next to tibia on fascia of gastrocnemius Jennifer Mcclure

## (undated) DEVICE — MEDIA CONTRAST RX ISOVUE-300 61% 30ML VIALS

## (undated) DEVICE — Z INACTIVE USE 2660664 SOLUTION IRRIG 3000ML 0.9% SOD CHL USP UROMATIC PLAS CONT

## (undated) DEVICE — BAG DRAINAGE CONTAINER 15 LT FLUID COLLCTN

## (undated) DEVICE — CATHETER URET 5FR L70CM OPN END SGL LUMN INJ HUB FLEXIMA

## (undated) DEVICE — CYSTO PACK: Brand: MEDLINE INDUSTRIES, INC.

## (undated) DEVICE — CATHETER URETH 24FR BLLN 30CC STD LTX 3 W TWO OPP DRNGE EYE

## (undated) DEVICE — GLOVE SURG SZ 75 STD WHT LTX SYN POLYMER BEAD REINF ANTI RL

## (undated) DEVICE — HF-RESECTION ELECTRODE PLASMALOOP LOOP, LARGE, 24 FR., 12°/16°, ESG TURIS: Brand: OLYMPUS

## (undated) DEVICE — SYRINGE,TOOMEY,IRRIGATION,70CC,STERILE: Brand: MEDLINE

## (undated) DEVICE — CAMERA STRYKER 1488 HD GEN

## (undated) DEVICE — BAG DRNGE COMB PK

## (undated) DEVICE — READY WET SKIN SCRUB TRAY-LF: Brand: MEDLINE INDUSTRIES, INC.

## (undated) DEVICE — DRAINBAG,ANTI-REFLUX TOWER,L/F,2000ML,LL: Brand: MEDLINE

## (undated) DEVICE — GOWN,SIRUS,FABRNF,XL,20/CS: Brand: MEDLINE

## (undated) DEVICE — PLUG,CATHETER,DRAINAGE PROTECTOR,TUBE: Brand: MEDLINE

## (undated) DEVICE — SOLUTION IV IRRIG WATER 1000ML POUR BRL 2F7114